# Patient Record
Sex: FEMALE | Race: BLACK OR AFRICAN AMERICAN | NOT HISPANIC OR LATINO | Employment: UNEMPLOYED | URBAN - METROPOLITAN AREA
[De-identification: names, ages, dates, MRNs, and addresses within clinical notes are randomized per-mention and may not be internally consistent; named-entity substitution may affect disease eponyms.]

---

## 2017-04-06 ENCOUNTER — TELEPHONE (OUTPATIENT)
Dept: UROLOGY | Facility: CLINIC | Age: 42
End: 2017-04-06

## 2017-04-06 NOTE — TELEPHONE ENCOUNTER
----- Message from Catia Mata sent at 4/6/2017  9:53 AM CDT -----  Contact: pt [phone]  Pt states she would like to discuss possibly changing the dosage on her medication. Pt is asking that the nurse call her. Please call pt.

## 2017-04-18 ENCOUNTER — HOSPITAL ENCOUNTER (OUTPATIENT)
Dept: PREADMISSION TESTING | Facility: HOSPITAL | Age: 42
Discharge: HOME | End: 2017-04-18
Attending: ORTHOPAEDIC SURGERY
Payer: COMMERCIAL

## 2017-04-18 VITALS
HEIGHT: 63 IN | SYSTOLIC BLOOD PRESSURE: 116 MMHG | DIASTOLIC BLOOD PRESSURE: 78 MMHG | RESPIRATION RATE: 18 BRPM | TEMPERATURE: 98 F | WEIGHT: 204 LBS | HEART RATE: 72 BPM | BODY MASS INDEX: 36.14 KG/M2

## 2017-04-18 DIAGNOSIS — Z01.818 PRE-OP TESTING: Primary | ICD-10-CM

## 2017-04-18 LAB
BASOPHILS # BLD AUTO: 0.05 K/UL
BASOPHILS NFR BLD: 0.7 %
DIFFERENTIAL METHOD: NORMAL
EOSINOPHIL # BLD AUTO: 0.3 K/UL
EOSINOPHIL NFR BLD: 3.5 %
ERYTHROCYTE [DISTWIDTH] IN BLOOD BY AUTOMATED COUNT: 12.8 %
HCT VFR BLD AUTO: 40 %
HGB BLD-MCNC: 13.2 G/DL
LYMPHOCYTES # BLD AUTO: 3.3 K/UL
LYMPHOCYTES NFR BLD: 43.3 %
MCH RBC QN AUTO: 29.9 PG
MCHC RBC AUTO-ENTMCNC: 33 %
MCV RBC AUTO: 91 FL
MONOCYTES # BLD AUTO: 0.5 K/UL
MONOCYTES NFR BLD: 6.5 %
NEUTROPHILS # BLD AUTO: 3.5 K/UL
NEUTROPHILS NFR BLD: 45.9 %
PLATELET # BLD AUTO: 263 K/UL
PMV BLD AUTO: 11.2 FL
RBC # BLD AUTO: 4.41 M/UL
WBC # BLD AUTO: 7.64 K/UL

## 2017-04-18 PROCEDURE — 85025 COMPLETE CBC W/AUTO DIFF WBC: CPT

## 2017-04-18 PROCEDURE — 36415 COLL VENOUS BLD VENIPUNCTURE: CPT

## 2017-04-18 NOTE — IP AVS SNAPSHOT
[place] [address] [place] [address]  Phone: [phone]           Patient Discharge Instructions  Our goal is to set you up for success. This packet includes information on your condition, medications, and your home care. It will help you care for yourself to prevent having to return to the hospital.     Please ask your nurse if you have any questions.      There are many details to remember when preparing for your surgery. Here is what you will need to do, please ask your nurse if there are more specific instructions and if you have any questions:    1. Before procedure Do not smoke or drink alcoholic beverages 24 hours prior to your procedure. Do not eat or drink anything 8 hours before your procedure - this includes gum, mints, and candy.     2. Day of procedure Please remove all jewelry for the procedure. If you wear contact lenses, dentures, hearing aids or glasses, bring a container to put them in during your surgery and give to a family member.  If your doctor has scheduled you for an overnight stay, bring a small overnight bag with any personal items that you need.      3. After procedure  Make arrangements in advance for transportation home by a responsible adult. It is not safe to drive a vehicle during the 24 hours following surgery.     PLEASE NOTE: You may be contacted the day before your surgery to confirm your surgery date and arrival time. The Surgery schedule has many variables which may affect the time of your surgery case. Family members should be available if your surgery time changes.           [facility]  Unless otherwise directed by your provider, please   contact [facility], our nurse care line   that is available for 24/7 assistance.     [phone] (toll-free)     Registered nurses in the [facility]   provide: appointment scheduling, clinical advisement, health education, and other advisory services.                  ** Verify  the list of medication(s) below is accurate and up to date. Carry this with you in case of emergency. If your medications have changed, please notify your healthcare provider.             Medication List      TAKE these medications        Additional Info                      amitriptyline 50 MG tablet   Commonly known as:  ELAVIL   Quantity:  30 tablet   Refills:  11   Dose:  50 mg    Instructions:  Take 1 tablet (50 mg total) by mouth every evening. At 6 p.m.     Begin Date    AM    Noon    PM    Bedtime       butalbital-acetaminophen-caffeine -40 mg -40 mg per tablet   Commonly known as:  FIORICET, ESGIC   Quantity:  50 tablet   Refills:  1   Dose:  1-2 tablet   What changed:  Another medication with the same name was removed. Continue taking this medication, and follow the directions you see here.    Instructions:  Take 1-2 tablets by mouth every 6 (six) hours as needed for Headaches.     Begin Date    AM    Noon    PM    Bedtime       estradiol 1 MG tablet   Commonly known as:  ESTRACE   Refills:  0   Dose:  1 mg    Instructions:  Take 1 mg by mouth once daily.     Begin Date    AM    Noon    PM    Bedtime       hydrOXYzine HCl 25 MG tablet   Commonly known as:  ATARAX   Quantity:  30 tablet   Refills:  11   Dose:  25 mg   Comments:  Generic For:ATARAX 25MG    Instructions:  Take 1 tablet (25 mg total) by mouth every evening.     Begin Date    AM    Noon    PM    Bedtime       methen-m.blue-s.phos-phsal-hyo 81-10.8-40.8 mg Tab   Commonly known as:  URELLE   Quantity:  30 each   Refills:  prn   Dose:  1 tablet    Instructions:  Take 1 tablet by mouth 3 (three) times daily.     Begin Date    AM    Noon    PM    Bedtime       oxybutynin 5 MG Tab   Commonly known as:  DITROPAN   Quantity:  60 tablet   Refills:  12    Instructions:  TAKE 1 TABLET BY MOUTH TWO TIMES DAILY     Begin Date    AM    Noon    PM    Bedtime       promethazine 25 MG tablet   Commonly known as:  PHENERGAN   Quantity:  15 tablet    Refills:  0   Dose:  25 mg    Instructions:  Take 1 tablet (25 mg total) by mouth every 6 (six) hours as needed for Nausea.     Begin Date    AM    Noon    PM    Bedtime                  Please bring to all follow up appointments:    1. A copy of your discharge instructions.  2. All medicines you are currently taking in their original bottles.  3. Identification and insurance card.    Please arrive 15 minutes ahead of scheduled appointment time.    Please call 24 hours in advance if you must reschedule your appointment and/or time.        Your Scheduled Appointments     Apr 18, 2017  3:30 PM CDT   Pre-Admit Testing Visit with PRE-ADMIT 1, [facility] ([facility])    [address] [place] [id number] [phone]              Your Future Surgeries/Procedures     Apr 20, 2017   Surgery with Lino Anderson MD   [facility] ([facility])    [address] [place] [id number] [phone]                  Discharge Instructions         Your surgery is scheduled for ___4/18/2017_________________.    Call [phone] between 2 p.m. and 5 p.m. on   ___4/17/2017____________ to find out your arrival time for the day of your surgery.      Please report to SAME DAY SURGERY UNIT on the 2nd FLOOR at _______ a.m.  Use front door entrance. The doors open at 0530 am.      INSTRUCTIONS IMPORTANT!!!  ¨ Do not eat or drink after 12 midnight-including water. OK to brush teeth, no   gum, candy or mints!    ¨ Take only these medicines with a small swallow of water-morning of surgery.           None         _x___  Prep instructions: ENEMA   SHOWER   OTHER  ______________________  _x___  Please shower using Hibiclens soap the night before AND  the morning of                  your surgery/procedure. Do not use Hibiclens on your face or genitals               If your surgery is around your belly button (Navel) be sure to wash inside  your            belly button also. Rinse hibiclens off completely.  __x__  No shaving of procedural area at least 4-5 days before surgery due to                        increased risk of skin irritation and/or possible infection.  __x__  Do not wear makeup, including mascara. WEARING EYE MAKEUP MAY                   LEAD TO SERIOUS EYE INJURY during surgery.  _x___  No powder, lotions or creams to your body.  _x___  You may wear only deodorant on the day of surgery.  _x___  Please remove all jewelry, including piercings and leave at home.  _x___  No money or valuables needed. Please leave at home.  You may bring your           cell phone.  _x___  Please bring any documents given by your doctor.  __x__  If going home the same day, arrange for a ride home. You will not be able to   drive if Anesthesia was used.    _x___  Wear loose fitting clothing. Allow for dressings, bandages.  _x___  Stop Aspirin, Ibuprofen, Motrin and Aleve at least 3-5 days before                       surgery, unless otherwise instructed by your doctor, or the nurse.              You MAY use Tylenol/acetaminophen until day of surgery.    _x___  Call MD for temperature above 101 degrees.        _x___ Stop taking any Fish Oil supplement or any Vitamins that contain Vitamin                  E at least 5 days prior to surgery.          I have read or had read and explained to me, and understand the above information.  Additional comments or instructions:Please call   [phone] if you have any questions regarding the instructions above.                 Admission Information     Date & Time Provider Department [facility]    4/18/2017  3:30 PM Lino Anderson MD [facility] [phone]      Care Providers     Provider Role Specialty Primary office phone    Lino Anderson MD Attending Provider Plastic Surgery [phone]      Your Vitals Were     BP Pulse Temp Resp Height Weight    116/78 (BP Location: Left arm, Patient Position: Sitting,  "BP Method: Automatic) 72 97.6 °F (36.4 °C) (Oral) 18 5' 3" (1.6 m) 92.5 kg (204 lb)    Last Period BMI             08/26/2008 36.14 kg/m2         Recent Lab Values     No lab values to display.      Allergies as of 4/18/2017        Reactions    Bactrim [Sulfamethoxazole-trimethoprim] Other (See Comments)    headache    Codeine Other (See Comments)    Other reaction(s): Vomiting    Zofran [Ondansetron Hcl (Pf)] Other (See Comments)    constipation      Advance Directives     An advance directive is a document which, in the event you are no longer able to make decisions for yourself, tells your healthcare team what kind of treatment you do or do not want to receive, or who you would like to make those decisions for you.  If you do not currently have an advance directive, [facility] encourages you to create one.  For more information call:  [phone]), [phone] ([phone]),  or log on to [email or web address].        Language Assistance Services     ATTENTION: Language assistance services are available, free of charge. Please call [phone].      ATENCIÓN: Si habla español, tiene a ortiz disposición servicios gratuitos de asistencia lingüística. Llame al [phone].     CHÚ Ý: N?u b?n nói Ti?ng Vi?t, có các d?ch v? h? tr? ngôn ng? mi?n phí dành cho b?n. G?i s? [phone].        [facility] Sign-Up     Activating your [facility] account is as easy as 1-2-3!     1) Visit [email or web address], select Sign Up Now, enter this activation code and your date of birth, then select Next.  [email or web address]  Expires: 6/2/2017 12:03 PM      2) Create a username and password to use when you visit [facility] in the future and select a security question in case you lose your password and select Next.    3) Enter your e-mail address and click Sign Up!    Additional Information  If you have questions, please e-mail [email or web address] or call [phone] to talk to our [facility] staff. Remember, [facility] is " NOT to be used for urgent needs. For medical emergencies, dial 911.          [facility] complies with applicable Federal civil rights laws and does not discriminate on the basis of race, color, national origin, age, disability, or sex.

## 2017-04-18 NOTE — DISCHARGE INSTRUCTIONS
Your surgery is scheduled for ___4/18/2017_________________.    Call [phone] between 2 p.m. and 5 p.m. on   ___4/17/2017____________ to find out your arrival time for the day of your surgery.      Please report to SAME DAY SURGERY UNIT on the 2nd FLOOR at _______ a.m.  Use front door entrance. The doors open at 0530 am.      INSTRUCTIONS IMPORTANT!!!  ¨ Do not eat or drink after 12 midnight-including water. OK to brush teeth, no   gum, candy or mints!    ¨ Take only these medicines with a small swallow of water-morning of surgery.           None         _x___  Prep instructions: ENEMA   SHOWER   OTHER  ______________________  _x___  Please shower using Hibiclens soap the night before AND  the morning of                  your surgery/procedure. Do not use Hibiclens on your face or genitals               If your surgery is around your belly button (Navel) be sure to wash inside your            belly button also. Rinse hibiclens off completely.  __x__  No shaving of procedural area at least 4-5 days before surgery due to                        increased risk of skin irritation and/or possible infection.  __x__  Do not wear makeup, including mascara. WEARING EYE MAKEUP MAY                   LEAD TO SERIOUS EYE INJURY during surgery.  _x___  No powder, lotions or creams to your body.  _x___  You may wear only deodorant on the day of surgery.  _x___  Please remove all jewelry, including piercings and leave at home.  _x___  No money or valuables needed. Please leave at home.  You may bring your           cell phone.  _x___  Please bring any documents given by your doctor.  __x__  If going home the same day, arrange for a ride home. You will not be able to   drive if Anesthesia was used.    _x___  Wear loose fitting clothing. Allow for dressings, bandages.  _x___  Stop Aspirin, Ibuprofen, Motrin and Aleve at least 3-5 days before                       surgery, unless otherwise instructed by your doctor, or the nurse.               You MAY use Tylenol/acetaminophen until day of surgery.    _x___  Call MD for temperature above 101 degrees.        _x___ Stop taking any Fish Oil supplement or any Vitamins that contain Vitamin                  E at least 5 days prior to surgery.          I have read or had read and explained to me, and understand the above information.  Additional comments or instructions:Please call   [phone] if you have any questions regarding the instructions above.

## 2017-04-19 ENCOUNTER — ANESTHESIA EVENT (OUTPATIENT)
Dept: SURGERY | Facility: HOSPITAL | Age: 42
End: 2017-04-19
Payer: COMMERCIAL

## 2017-04-20 ENCOUNTER — ANESTHESIA (OUTPATIENT)
Dept: SURGERY | Facility: HOSPITAL | Age: 42
End: 2017-04-20
Payer: COMMERCIAL

## 2017-04-20 ENCOUNTER — HOSPITAL ENCOUNTER (OUTPATIENT)
Facility: HOSPITAL | Age: 42
Discharge: HOME | End: 2017-04-21
Attending: PLASTIC SURGERY | Admitting: PLASTIC SURGERY
Payer: COMMERCIAL

## 2017-04-20 DIAGNOSIS — N62 MACROMASTIA: Primary | ICD-10-CM

## 2017-04-20 PROCEDURE — 63600175 PHARM REV CODE 636 W HCPCS: Performed by: PLASTIC SURGERY

## 2017-04-20 PROCEDURE — 88305 TISSUE EXAM BY PATHOLOGIST: CPT | Mod: 26,,, | Performed by: PATHOLOGY

## 2017-04-20 PROCEDURE — 36000707: Performed by: PLASTIC SURGERY

## 2017-04-20 PROCEDURE — 00402 ANES INTEG SYS RCNSTV BREAST: CPT | Performed by: PLASTIC SURGERY

## 2017-04-20 PROCEDURE — 71000039 HC RECOVERY, EACH ADD'L HOUR: Performed by: PLASTIC SURGERY

## 2017-04-20 PROCEDURE — 25000003 PHARM REV CODE 250: Performed by: ANESTHESIOLOGY

## 2017-04-20 PROCEDURE — 71000033 HC RECOVERY, INTIAL HOUR: Performed by: PLASTIC SURGERY

## 2017-04-20 PROCEDURE — C1729 CATH, DRAINAGE: HCPCS | Performed by: PLASTIC SURGERY

## 2017-04-20 PROCEDURE — V2790 AMNIOTIC MEMBRANE: HCPCS | Performed by: PLASTIC SURGERY

## 2017-04-20 PROCEDURE — 63600175 PHARM REV CODE 636 W HCPCS: Performed by: NURSE ANESTHETIST, CERTIFIED REGISTERED

## 2017-04-20 PROCEDURE — 27100025 HC TUBING, SET FLUID WARMER: Performed by: NURSE ANESTHETIST, CERTIFIED REGISTERED

## 2017-04-20 PROCEDURE — 88305 TISSUE EXAM BY PATHOLOGIST: CPT | Performed by: PATHOLOGY

## 2017-04-20 PROCEDURE — 63600175 PHARM REV CODE 636 W HCPCS: Performed by: ANESTHESIOLOGY

## 2017-04-20 PROCEDURE — D9220A PRA ANESTHESIA: Mod: ANES,,, | Performed by: ANESTHESIOLOGY

## 2017-04-20 PROCEDURE — 37000008 HC ANESTHESIA 1ST 15 MINUTES: Performed by: PLASTIC SURGERY

## 2017-04-20 PROCEDURE — 36000706: Performed by: PLASTIC SURGERY

## 2017-04-20 PROCEDURE — 63600175 PHARM REV CODE 636 W HCPCS

## 2017-04-20 PROCEDURE — 37000009 HC ANESTHESIA EA ADD 15 MINS: Performed by: PLASTIC SURGERY

## 2017-04-20 PROCEDURE — 25000003 PHARM REV CODE 250: Performed by: PLASTIC SURGERY

## 2017-04-20 PROCEDURE — 25000003 PHARM REV CODE 250: Performed by: NURSE ANESTHETIST, CERTIFIED REGISTERED

## 2017-04-20 PROCEDURE — D9220A PRA ANESTHESIA: Mod: CRNA,,, | Performed by: NURSE ANESTHETIST, CERTIFIED REGISTERED

## 2017-04-20 DEVICE — IMPLANTABLE DEVICE: Type: IMPLANTABLE DEVICE | Site: BREAST | Status: FUNCTIONAL

## 2017-04-20 RX ORDER — PROMETHAZINE HYDROCHLORIDE 25 MG/ML
12.5 INJECTION, SOLUTION INTRAMUSCULAR; INTRAVENOUS EVERY 6 HOURS PRN
Status: DISCONTINUED | OUTPATIENT
Start: 2017-04-20 | End: 2017-04-21 | Stop reason: HOSPADM

## 2017-04-20 RX ORDER — DEXAMETHASONE SODIUM PHOSPHATE 4 MG/ML
INJECTION, SOLUTION INTRA-ARTICULAR; INTRALESIONAL; INTRAMUSCULAR; INTRAVENOUS; SOFT TISSUE
Status: DISCONTINUED | OUTPATIENT
Start: 2017-04-20 | End: 2017-04-20

## 2017-04-20 RX ORDER — FENTANYL CITRATE 50 UG/ML
INJECTION, SOLUTION INTRAMUSCULAR; INTRAVENOUS
Status: DISPENSED
Start: 2017-04-20 | End: 2017-04-21

## 2017-04-20 RX ORDER — DIPHENHYDRAMINE HYDROCHLORIDE 50 MG/ML
25 INJECTION INTRAMUSCULAR; INTRAVENOUS EVERY 6 HOURS PRN
Status: DISCONTINUED | OUTPATIENT
Start: 2017-04-20 | End: 2017-04-20 | Stop reason: HOSPADM

## 2017-04-20 RX ORDER — METOCLOPRAMIDE HYDROCHLORIDE 5 MG/ML
10 INJECTION INTRAMUSCULAR; INTRAVENOUS EVERY 6 HOURS PRN
Status: DISCONTINUED | OUTPATIENT
Start: 2017-04-21 | End: 2017-04-21 | Stop reason: HOSPADM

## 2017-04-20 RX ORDER — MIDAZOLAM HYDROCHLORIDE 1 MG/ML
INJECTION INTRAMUSCULAR; INTRAVENOUS
Status: DISPENSED
Start: 2017-04-20 | End: 2017-04-21

## 2017-04-20 RX ORDER — FENTANYL CITRATE 50 UG/ML
INJECTION, SOLUTION INTRAMUSCULAR; INTRAVENOUS
Status: DISCONTINUED | OUTPATIENT
Start: 2017-04-20 | End: 2017-04-20

## 2017-04-20 RX ORDER — CEFAZOLIN SODIUM 2 G/50ML
SOLUTION INTRAVENOUS
Status: DISPENSED
Start: 2017-04-20 | End: 2017-04-21

## 2017-04-20 RX ORDER — MEPERIDINE HYDROCHLORIDE 50 MG/ML
12.5 INJECTION INTRAMUSCULAR; INTRAVENOUS; SUBCUTANEOUS ONCE AS NEEDED
Status: DISCONTINUED | OUTPATIENT
Start: 2017-04-20 | End: 2017-04-20 | Stop reason: HOSPADM

## 2017-04-20 RX ORDER — CEFAZOLIN SODIUM 2 G/50ML
2 SOLUTION INTRAVENOUS
Status: DISCONTINUED | OUTPATIENT
Start: 2017-04-20 | End: 2017-04-21 | Stop reason: HOSPADM

## 2017-04-20 RX ORDER — PHENYLEPHRINE HYDROCHLORIDE 10 MG/ML
INJECTION INTRAVENOUS
Status: DISCONTINUED | OUTPATIENT
Start: 2017-04-20 | End: 2017-04-20

## 2017-04-20 RX ORDER — LIDOCAINE HYDROCHLORIDE AND EPINEPHRINE 5; 5 MG/ML; UG/ML
INJECTION, SOLUTION INFILTRATION; PERINEURAL
Status: DISCONTINUED | OUTPATIENT
Start: 2017-04-20 | End: 2017-04-20 | Stop reason: HOSPADM

## 2017-04-20 RX ORDER — LORAZEPAM 2 MG/ML
0.25 INJECTION INTRAMUSCULAR ONCE AS NEEDED
Status: DISCONTINUED | OUTPATIENT
Start: 2017-04-20 | End: 2017-04-20 | Stop reason: HOSPADM

## 2017-04-20 RX ORDER — PROMETHAZINE HYDROCHLORIDE 25 MG/1
25 SUPPOSITORY RECTAL EVERY 6 HOURS PRN
Status: DISCONTINUED | OUTPATIENT
Start: 2017-04-20 | End: 2017-04-21 | Stop reason: HOSPADM

## 2017-04-20 RX ORDER — SODIUM CHLORIDE, SODIUM LACTATE, POTASSIUM CHLORIDE, CALCIUM CHLORIDE 600; 310; 30; 20 MG/100ML; MG/100ML; MG/100ML; MG/100ML
INJECTION, SOLUTION INTRAVENOUS CONTINUOUS
Status: DISCONTINUED | OUTPATIENT
Start: 2017-04-20 | End: 2017-04-21 | Stop reason: HOSPADM

## 2017-04-20 RX ORDER — LIDOCAINE HYDROCHLORIDE 10 MG/ML
1 INJECTION, SOLUTION EPIDURAL; INFILTRATION; INTRACAUDAL; PERINEURAL ONCE
Status: DISCONTINUED | OUTPATIENT
Start: 2017-04-20 | End: 2017-04-21 | Stop reason: HOSPADM

## 2017-04-20 RX ORDER — OXYCODONE AND ACETAMINOPHEN 5; 325 MG/1; MG/1
2 TABLET ORAL EVERY 4 HOURS PRN
Status: DISCONTINUED | OUTPATIENT
Start: 2017-04-20 | End: 2017-04-21 | Stop reason: HOSPADM

## 2017-04-20 RX ORDER — MIDAZOLAM HYDROCHLORIDE 1 MG/ML
INJECTION, SOLUTION INTRAMUSCULAR; INTRAVENOUS
Status: DISCONTINUED | OUTPATIENT
Start: 2017-04-20 | End: 2017-04-20

## 2017-04-20 RX ORDER — ROCURONIUM BROMIDE 10 MG/ML
INJECTION, SOLUTION INTRAVENOUS
Status: DISCONTINUED | OUTPATIENT
Start: 2017-04-20 | End: 2017-04-20

## 2017-04-20 RX ORDER — PROPOFOL 10 MG/ML
VIAL (ML) INTRAVENOUS
Status: DISCONTINUED | OUTPATIENT
Start: 2017-04-20 | End: 2017-04-20

## 2017-04-20 RX ORDER — KETOROLAC TROMETHAMINE 30 MG/ML
30 INJECTION, SOLUTION INTRAMUSCULAR; INTRAVENOUS EVERY 6 HOURS
Status: COMPLETED | OUTPATIENT
Start: 2017-04-20 | End: 2017-04-21

## 2017-04-20 RX ORDER — OXYCODONE AND ACETAMINOPHEN 5; 325 MG/1; MG/1
1 TABLET ORAL EVERY 4 HOURS PRN
Status: DISCONTINUED | OUTPATIENT
Start: 2017-04-20 | End: 2017-04-21 | Stop reason: HOSPADM

## 2017-04-20 RX ORDER — HYDROMORPHONE HYDROCHLORIDE 2 MG/ML
0.2 INJECTION, SOLUTION INTRAMUSCULAR; INTRAVENOUS; SUBCUTANEOUS EVERY 5 MIN PRN
Status: DISCONTINUED | OUTPATIENT
Start: 2017-04-20 | End: 2017-04-20 | Stop reason: HOSPADM

## 2017-04-20 RX ORDER — LIDOCAINE HCL/PF 100 MG/5ML
SYRINGE (ML) INTRAVENOUS
Status: DISCONTINUED | OUTPATIENT
Start: 2017-04-20 | End: 2017-04-20

## 2017-04-20 RX ORDER — METOPROLOL TARTRATE 1 MG/ML
INJECTION, SOLUTION INTRAVENOUS
Status: DISCONTINUED | OUTPATIENT
Start: 2017-04-20 | End: 2017-04-20

## 2017-04-20 RX ORDER — ACETAMINOPHEN 10 MG/ML
INJECTION, SOLUTION INTRAVENOUS
Status: COMPLETED
Start: 2017-04-20 | End: 2017-04-20

## 2017-04-20 RX ADMIN — DEXAMETHASONE SODIUM PHOSPHATE 8 MG: 4 INJECTION, SOLUTION INTRAMUSCULAR; INTRAVENOUS at 12:04

## 2017-04-20 RX ADMIN — HYDROMORPHONE HYDROCHLORIDE 0.2 MG: 2 INJECTION INTRAMUSCULAR; INTRAVENOUS; SUBCUTANEOUS at 06:04

## 2017-04-20 RX ADMIN — SODIUM CHLORIDE, SODIUM LACTATE, POTASSIUM CHLORIDE, AND CALCIUM CHLORIDE: .6; .31; .03; .02 INJECTION, SOLUTION INTRAVENOUS at 02:04

## 2017-04-20 RX ADMIN — PROPOFOL 200 MG: 10 INJECTION, EMULSION INTRAVENOUS at 12:04

## 2017-04-20 RX ADMIN — ROCURONIUM BROMIDE 20 MG: 10 INJECTION, SOLUTION INTRAVENOUS at 01:04

## 2017-04-20 RX ADMIN — KETOROLAC TROMETHAMINE 30 MG: 30 INJECTION, SOLUTION INTRAMUSCULAR at 06:04

## 2017-04-20 RX ADMIN — ROCURONIUM BROMIDE 40 MG: 10 INJECTION, SOLUTION INTRAVENOUS at 12:04

## 2017-04-20 RX ADMIN — LIDOCAINE HYDROCHLORIDE 100 MG: 20 INJECTION, SOLUTION INTRAVENOUS at 12:04

## 2017-04-20 RX ADMIN — METOPROLOL TARTRATE 2.5 MG: 1 INJECTION, SOLUTION INTRAVENOUS at 01:04

## 2017-04-20 RX ADMIN — MIDAZOLAM HYDROCHLORIDE 2 MG: 1 INJECTION, SOLUTION INTRAMUSCULAR; INTRAVENOUS at 12:04

## 2017-04-20 RX ADMIN — FENTANYL CITRATE 25 MCG: 50 INJECTION INTRAMUSCULAR; INTRAVENOUS at 05:04

## 2017-04-20 RX ADMIN — PHENYLEPHRINE HYDROCHLORIDE 200 MCG: 10 INJECTION INTRAVENOUS at 03:04

## 2017-04-20 RX ADMIN — PROMETHAZINE HYDROCHLORIDE 6.25 MG: 25 INJECTION INTRAMUSCULAR; INTRAVENOUS at 06:04

## 2017-04-20 RX ADMIN — CEFAZOLIN SODIUM 2 G: 2 SOLUTION INTRAVENOUS at 12:04

## 2017-04-20 RX ADMIN — PROMETHAZINE HYDROCHLORIDE 25 MG: 25 SUPPOSITORY RECTAL at 09:04

## 2017-04-20 RX ADMIN — FENTANYL CITRATE 50 MCG: 50 INJECTION INTRAMUSCULAR; INTRAVENOUS at 04:04

## 2017-04-20 RX ADMIN — FENTANYL CITRATE 100 MCG: 50 INJECTION INTRAMUSCULAR; INTRAVENOUS at 12:04

## 2017-04-20 RX ADMIN — SODIUM CHLORIDE, SODIUM LACTATE, POTASSIUM CHLORIDE, AND CALCIUM CHLORIDE: .6; .31; .03; .02 INJECTION, SOLUTION INTRAVENOUS at 05:04

## 2017-04-20 RX ADMIN — CEFAZOLIN SODIUM 2 G: 2 SOLUTION INTRAVENOUS at 04:04

## 2017-04-20 RX ADMIN — KETOROLAC TROMETHAMINE 30 MG: 30 INJECTION, SOLUTION INTRAMUSCULAR at 11:04

## 2017-04-20 RX ADMIN — ACETAMINOPHEN 1000 MG: 10 INJECTION, SOLUTION INTRAVENOUS at 04:04

## 2017-04-20 RX ADMIN — SODIUM CHLORIDE, SODIUM LACTATE, POTASSIUM CHLORIDE, AND CALCIUM CHLORIDE: .6; .31; .03; .02 INJECTION, SOLUTION INTRAVENOUS at 10:04

## 2017-04-20 RX ADMIN — FENTANYL CITRATE 100 MCG: 50 INJECTION INTRAMUSCULAR; INTRAVENOUS at 01:04

## 2017-04-20 RX ADMIN — METOCLOPRAMIDE 10 MG: 5 INJECTION, SOLUTION INTRAMUSCULAR; INTRAVENOUS at 11:04

## 2017-04-20 RX ADMIN — FENTANYL CITRATE 50 MCG: 50 INJECTION INTRAMUSCULAR; INTRAVENOUS at 05:04

## 2017-04-20 RX ADMIN — FENTANYL CITRATE 50 MCG: 50 INJECTION INTRAMUSCULAR; INTRAVENOUS at 02:04

## 2017-04-20 RX ADMIN — DEXAMETHASONE SODIUM PHOSPHATE 6 MG: 4 INJECTION, SOLUTION INTRAMUSCULAR; INTRAVENOUS at 04:04

## 2017-04-20 NOTE — INTERVAL H&P NOTE
The patient has been examined and the H&P has been reviewed:Scan on 4/18/2017 1:11 PM by Kaylee Lin : H&P    I concur with the findings and no changes have occurred since H&P was written.    Anesthesia/Surgery risks, benefits and alternative options discussed and understood by patient/family.          Active Hospital Problems    Diagnosis  POA    Macromastia [N62]  Yes      Resolved Hospital Problems    Diagnosis Date Resolved POA   No resolved problems to display.

## 2017-04-20 NOTE — BRIEF OP NOTE
[facility]  Brief Operative Note     SUMMARY     Surgery Date: 4/20/2017     Surgeon(s) and Role:     * Lino Anderson MD - Primary    Assisting Surgeon: Ubaldo    Pre-op Diagnosis:  Macromastia [N62]  Chronic neck pain [M54.2, G89.29]  Chronic back pain, unspecified back location, unspecified back pain laterality [M54.9, G89.29]    Post-op Diagnosis:  Post-Op Diagnosis Codes:     * Macromastia [N62]     * Chronic neck pain [M54.2, G89.29]     * Chronic back pain, unspecified back location, unspecified back pain laterality [M54.9, G89.29]    Procedure(s) (LRB):  REDUCTION-MAMMOPLASTY-BILATERAL (Bilateral)    Anesthesia: General    Description of the findings of the procedure: marked preop area.  NNP was 24 cm from sternal notch, vertical limbs were 9 cm divergent 60 degrees.      Findings/Key Components: marked macromastia, firm breasts, moderate ptosis.  Inferior pedical reduction Right 930 grams; Left 832 grams, tollerated well    Estimated Blood Loss: 50 cc         Specimens:   Specimen (12h ago through future)    Start     Ordered    04/20/17 1622  Specimen to Pathology - Surgery  Once     Comments:  -Right breast tissue    -Left breast tissue    For FRESH    04/20/17 1622    04/20/17 1447  Specimen to Pathology - Surgery  Once     Comments:  Left breast tissue    04/20/17 1447          Discharge Note    SUMMARY     Admit Date: 4/20/2017    Discharge Date and Time: No discharge date for patient encounter.    Hospital Course Patient had chronic neck and back pain associated with marcomastia.  She underwent reduction mammaplasty.    Final Diagnosis: Post-Op Diagnosis Codes:     * Macromastia [N62]     * Chronic neck pain [M54.2, G89.29]     * Chronic back pain, unspecified back location, unspecified back pain laterality [M54.9, G89.29]    Disposition: Home or Self Care   Dict [id number]    Follow Up/Patient Instructions: up as desired, may shower and shampoo tomorrow.    Medications:  Reconciled  Home Medications:   Current Discharge Medication List      CONTINUE these medications which have NOT CHANGED    Details   amitriptyline (ELAVIL) 50 MG tablet Take 1 tablet (50 mg total) by mouth every evening. At 6 p.m.  Qty: 30 tablet, Refills: 11    Associated Diagnoses: Intractable migraine without aura and without status migrainosus      estradiol (ESTRACE) 1 MG tablet Take 1 mg by mouth once daily.       hydrOXYzine HCl (ATARAX) 25 MG tablet Take 1 tablet (25 mg total) by mouth every evening.  Qty: 30 tablet, Refills: 11    Comments: Generic For:ATARAX 25MG      oxybutynin (DITROPAN) 5 MG Tab TAKE 1 TABLET BY MOUTH TWO TIMES DAILY  Qty: 60 tablet, Refills: 12    Associated Diagnoses: OAB (overactive bladder)      promethazine (PHENERGAN) 25 MG tablet Take 1 tablet (25 mg total) by mouth every 6 (six) hours as needed for Nausea.  Qty: 15 tablet, Refills: 0      TAMSULOSIN HCL (FLOMAX ORAL) Take 0.4 mg by mouth once daily.      butalbital-acetaminophen-caffeine -40 mg (FIORICET, ESGIC) -40 mg per tablet Take 1-2 tablets by mouth every 6 (six) hours as needed for Headaches.  Qty: 50 tablet, Refills: 1    Associated Diagnoses: Intractable migraine without aura and without status migrainosus      chey-m.blue-s.phos-phsal-hyo (URELLE) 81-10.8-40.8 mg Tab Take 1 tablet by mouth 3 (three) times daily.  Qty: 30 each, Refills: prn    Associated Diagnoses: Dysuria; Interstitial cystitis           No discharge procedures on file.

## 2017-04-20 NOTE — TRANSFER OF CARE
"Anesthesia Transfer of Care Note    Patient: Elana Gonzales    Procedure(s) Performed: Procedure(s) (LRB):  REDUCTION-MAMMOPLASTY-BILATERAL (Bilateral)    Patient location: PACU    Anesthesia Type: general    Transport from OR: Transported from OR on room air with adequate spontaneous ventilation    Post pain: adequate analgesia    Post assessment: no apparent anesthetic complications    Post vital signs: stable    Level of consciousness: sedated and responds to stimulation    Nausea/Vomiting: no nausea/vomiting    Complications: none          Last vitals:   Visit Vitals    /70 (BP Location: Right arm, Patient Position: Lying, BP Method: Automatic)    Pulse (!) 114    Temp 37.5 °C (99.5 °F) (Oral)    Resp 10    Ht 5' 3" (1.6 m)    Wt 92.5 kg (204 lb)    LMP 08/26/2008    SpO2 96%    BMI 36.14 kg/m2     "

## 2017-04-20 NOTE — IP AVS SNAPSHOT
[place] [address] [address] [place] [address]  Phone: [phone]           Patient Discharge Instructions   Our goal is to set you up for success. This packet includes information on your condition, medications, and your home care.  It will help you care for yourself to prevent having to return to the hospital.     Please ask your nurse if you have any questions.      There are many details to remember when preparing to leave the hospital. Here is what you will need to do:    1. Take your medicine. If you are prescribed medications, review your Medication List on the following pages. You may have new medications to  at the pharmacy and others that you'll need to stop taking. Review the instructions for how and when to take your medications. Talk with your doctor or nurses if you are unsure of what to do.     2. Go to your follow-up appointments. Specific follow-up information is listed in the following pages. Your may be contacted by a nurse or clinical provider about future appointments. Be sure we have all of the phone numbers to reach you. Please contact your provider's office if you are unable to make an appointment.     3. Watch for warning signs. Your doctor or nurse will give you detailed warning signs to watch for and when to call for assistance. These instructions may also include educational information about your condition. If you experience any of warning signs to your health, call your doctor.           [facility]  Unless otherwise directed by your provider, please   contact [facility], our nurse care line   that is available for 24/7 assistance.     [phone] (toll-free)     Registered nurses in the [facility]   provide: appointment scheduling, clinical advisement, health education, and other advisory services.                  ** Verify the list of medication(s) below is accurate and up to date. Carry this with you in case of emergency.  If your medications have changed, please notify your healthcare provider.             Medication List      ASK your doctor about these medications        Additional Info                      amitriptyline 50 MG tablet   Commonly known as:  ELAVIL   Quantity:  30 tablet   Refills:  11   Dose:  50 mg    Instructions:  Take 1 tablet (50 mg total) by mouth every evening. At 6 p.m.     Begin Date    AM    Noon    PM    Bedtime       butalbital-acetaminophen-caffeine -40 mg -40 mg per tablet   Commonly known as:  FIORICET, ESGIC   Quantity:  50 tablet   Refills:  1   Dose:  1-2 tablet    Instructions:  Take 1-2 tablets by mouth every 6 (six) hours as needed for Headaches.     Begin Date    AM    Noon    PM    Bedtime       estradiol 1 MG tablet   Commonly known as:  ESTRACE   Refills:  0   Dose:  1 mg    Instructions:  Take 1 mg by mouth once daily.     Begin Date    AM    Noon    PM    Bedtime       FLOMAX ORAL   Refills:  0   Dose:  0.4 mg    Instructions:  Take 0.4 mg by mouth once daily.     Begin Date    AM    Noon    PM    Bedtime       hydrOXYzine HCl 25 MG tablet   Commonly known as:  ATARAX   Quantity:  30 tablet   Refills:  11   Dose:  25 mg   Comments:  Generic For:ATARAX 25MG    Instructions:  Take 1 tablet (25 mg total) by mouth every evening.     Begin Date    AM    Noon    PM    Bedtime       methen-m.blue-s.phos-phsal-hyo 81-10.8-40.8 mg Tab   Commonly known as:  URELLE   Quantity:  30 each   Refills:  prn   Dose:  1 tablet    Instructions:  Take 1 tablet by mouth 3 (three) times daily.     Begin Date    AM    Noon    PM    Bedtime       oxybutynin 5 MG Tab   Commonly known as:  DITROPAN   Quantity:  60 tablet   Refills:  12    Instructions:  TAKE 1 TABLET BY MOUTH TWO TIMES DAILY     Begin Date    AM    Noon    PM    Bedtime       promethazine 25 MG tablet   Commonly known as:  PHENERGAN   Quantity:  15 tablet   Refills:  0   Dose:  25 mg    Instructions:  Take 1 tablet (25 mg total) by mouth  "every 6 (six) hours as needed for Nausea.     Begin Date    AM    Noon    PM    Bedtime                  Please bring to all follow up appointments:    1. A copy of your discharge instructions.  2. All medicines you are currently taking in their original bottles.  3. Identification and insurance card.    Please arrive 15 minutes ahead of scheduled appointment time.    Please call 24 hours in advance if you must reschedule your appointment and/or time.        Follow-up Information     Follow up with Lino Anderson MD In 1 week.    Specialty:  Plastic Surgery    Contact information:    [address] [facility] PLASTIC/RECONSTRUCTIVE SURGERY  3RD FLOOR  [facility] [address]  [phone]          Discharge References/Attachments     BREAST REDUCTION, DISCHARGE INSTRUCTIONS (ENGLISH)        Admission Information     Date & Time Provider Department CSN    4/20/2017  9:58 AM Lino Anderson MD [facility] [id number]      Care Providers     Provider Role Specialty Primary office phone    Lino Anderson MD Attending Provider Plastic Surgery [phone]    Lino Anderson MD Surgeon  Plastic Surgery [phone]      Your Vitals Were     BP Pulse Temp Resp Height Weight    123/71 78 98.1 °F (36.7 °C) 18 5' 3" (1.6 m) 92.5 kg (204 lb)    Last Period SpO2 BMI          08/26/2008 98% 36.14 kg/m2        Recent Lab Values     No lab values to display.      Pending Labs     Order Current Status    Specimen to Pathology - Surgery Collected (04/20/17 1622)    Specimen to Pathology - Surgery In process      Allergies as of 4/21/2017        Reactions    Bactrim [Sulfamethoxazole-trimethoprim] Other (See Comments)    headache    Codeine Other (See Comments)    Other reaction(s): Vomiting    Zofran [Ondansetron Hcl (Pf)] Other (See Comments)    constipation      Advance Directives     An advance directive is a document which, in the event you are no longer able to make decisions for yourself, tells your " healthcare team what kind of treatment you do or do not want to receive, or who you would like to make those decisions for you.  If you do not currently have an advance directive, [facility] encourages you to create one.  For more information call:  [phone] ([phone]), [phone] ([phone]),  or log on to [email or web address].        Language Assistance Services     ATTENTION: Language assistance services are available, free of charge. Please call [phone].      ATENCIÓN: Si habla español, tiene a ortiz disposición servicios gratuitos de asistencia lingüística. Llame al [phone].     CHÚ Ý: N?u b?n nói Ti?ng Vi?t, có các d?ch v? h? tr? ngôn ng? mi?n phí dành cho b?n. G?i s? [phone].        [facility] Sign-Up     Activating your [facility] account is as easy as 1-2-3!     1) Visit [email or web address], select Sign Up Now, enter this activation code and your date of birth, then select Next.  [email or web address]  Expires: 6/2/2017 12:03 PM      2) Create a username and password to use when you visit [facility] in the future and select a security question in case you lose your password and select Next.    3) Enter your e-mail address and click Sign Up!    Additional Information  If you have questions, please e-mail [email or web address] or call [phone] to talk to our [facility] staff. Remember, [facility] is NOT to be used for urgent needs. For medical emergencies, dial 911.          [facility] complies with applicable Federal civil rights laws and does not discriminate on the basis of race, color, national origin, age, disability, or sex.

## 2017-04-21 VITALS
DIASTOLIC BLOOD PRESSURE: 71 MMHG | BODY MASS INDEX: 36.14 KG/M2 | HEART RATE: 78 BPM | SYSTOLIC BLOOD PRESSURE: 123 MMHG | OXYGEN SATURATION: 98 % | HEIGHT: 63 IN | WEIGHT: 204 LBS | RESPIRATION RATE: 18 BRPM | TEMPERATURE: 98 F

## 2017-04-21 PROCEDURE — 63600175 PHARM REV CODE 636 W HCPCS

## 2017-04-21 PROCEDURE — 63600175 PHARM REV CODE 636 W HCPCS: Performed by: ANESTHESIOLOGY

## 2017-04-21 RX ADMIN — METOCLOPRAMIDE 10 MG: 5 INJECTION, SOLUTION INTRAMUSCULAR; INTRAVENOUS at 05:04

## 2017-04-21 RX ADMIN — KETOROLAC TROMETHAMINE 30 MG: 30 INJECTION, SOLUTION INTRAMUSCULAR at 11:04

## 2017-04-21 RX ADMIN — KETOROLAC TROMETHAMINE 30 MG: 30 INJECTION, SOLUTION INTRAMUSCULAR at 05:04

## 2017-04-21 NOTE — PLAN OF CARE
Problem: Patient Care Overview  Goal: Plan of Care Review  Outcome: Ongoing (interventions implemented as appropriate)    04/20/17 1911   Coping/Psychosocial   Plan Of Care Reviewed With patient;daughter      Released from PACU per Dr Gonzalez. Drowsy but awakens to voice. Oriented x 3. Appears asleep, c/o pain 5/10 when awakened. Intermittent nausea resolved with administration of anti-emetic. Surgical dressings cdi. Awaiting room assignment.     Problem: Surgery Nonspecified (Adult)  Goal: Signs and Symptoms of Listed Potential Problems Will be Absent, Minimized or Managed (Surgery Nonspecified)  Signs and symptoms of listed potential problems will be absent, minimized or managed by discharge/transition of care (reference Surgery Nonspecified (Adult) CPG).  Outcome: Ongoing (interventions implemented as appropriate)    04/20/17 1911   Surgery Nonspecified   Problems Assessed (Surgery) bleeding/anemia;pain;postoperative nausea and vomiting;respiratory compromise;situational response   Problems Present (Surgery) pain       Goal: Anesthesia/Sedation Recovery  Outcome: Outcome(s) achieved Date Met:  04/20/17 04/20/17 1911   Goal/Outcome Evaluation   Anesthesia/Sedation Recovery criteria met for transfer

## 2017-04-21 NOTE — PROGRESS NOTES
Spoke to Dr. Conner regarding status of patient tolerating diet, orders to inform him of tolerating regular diet and when ready to discharge. Will continue to monitor.

## 2017-04-21 NOTE — PLAN OF CARE
Problem: Patient Care Overview  Goal: Plan of Care Review  Outcome: Ongoing (interventions implemented as appropriate)  POC discussed with pt, verb understanding. VSS, n/v relieved with Reglan admin.

## 2017-04-21 NOTE — PROGRESS NOTES
Notified Dr. Conner that patient tolerated a regular diet for lunch and wishes to be discharged. Verbal order to discharge patient home, ok to shower, home with LENIN drains, and follow up with MD in 1 week.

## 2017-04-21 NOTE — TREATMENT PLAN
Called placed to Dr. Anderson. Notified of pt vomitting, no IV phenergan available in hospital. New orders given, see chart.

## 2017-04-21 NOTE — PLAN OF CARE
Problem: Patient Care Overview  Goal: Plan of Care Review  Outcome: Ongoing (interventions implemented as appropriate)  Plan of care reviewed. VSS, gauze with surgical bra C/D/I. Patient wishes to discharge home today. Frequent checks made to ensure safety and comfort. Bed low, call bell within reach. Will continue to monitor.

## 2017-04-21 NOTE — PROGRESS NOTES
Plastic Surgery Progress Note  POD1 s/p B breast reduction    S: N/V overnight, not relieved by phenergan, some relief with Reglan. Not debbie diet. Afebrile. Pain controlled.    O:  VSS  RRR  Breast incisions C/D/I, ~50cc serosang R LENIN, ~30cc L LENIN    A: 40yo F Hx macromastia POD1 s/p B breast reduction    P:  -Cont post op care with N/V control  -Will need N/V to improve with some PO intake prior to discharge  -Cont local wound and drain care

## 2017-04-21 NOTE — OP NOTE
DATE OF PROCEDURE:  04/20/2017    PREOPERATIVE DIAGNOSES:  Chronic neck and back pain, macromastia.    POSTOPERATIVE DIAGNOSES:  Chronic neck and back pain, macromastia.    PROCEDURE:  Bilateral reduction mammoplasty.    SURGEON:  Lino Anderson M.D.    ASSISTANT:  Ubaldo.    ANESTHESIA:  General endotracheal.    PROCEDURE IN DETAIL:  The patient was marked in the preanesthetic area.  She had   a very large, very firm and very heavy breasts.  The new nipple point was   selected at 24 cm and the vertical limbs were 9 cm in length and diverged at 60   degrees.  The inframammary fold was marked, the medial and lateral points were   marked and the rest of the markings were placed on the patient.  She was then   taken to the Operating Room and placed under adequate general endotracheal   anesthesia, sterile catheter was inserted into the bladder and antiembolic pumps   were placed on the legs.    The chest wall was prepped with Betadine scrub followed by Betadine solution,   draped in a customary fashion.    Both breasts were done in the same fashion.  A nipple marker of 4.5 cm in   diameter was used to margret the nipple.  The inferior pedicle was then   deepithelialized.  The medial, lateral and superior triangle of skin and breast   were excised.    The breasts superior, medial and lateral flaps were then thinned to a thickness   of approximately 2.5 cm.  The anterior pedicle was tacked to the chest wall with   2-0 PDS.    The medial and lateral limbs were then placed with a 3-0 Vicryl suture into the   middle of the inframammary fold and the rest of the wounds were staple closed.    Two suction drains were left in prior to closing the wounds.  The patient was   then placed in the sitting position and the new nipple point was selected at 21   cm.    A 4 cm disk was used on both sides to make an outline for the excision and the   underlying skin and breast were excised.  The nipple was exteriorized.  The   wounds were  closed with 3-0 Vicryl and the areola and 4-0 Monocryl and the   vertical and horizontal limbs were closed with 2-0 Quill double layered.    Dermabond tape completed the closure.  The patient had 930 g removed from the   larger right side, 832 g from the smaller right side.  She tolerated the   procedure well and was sent back to Recovery Room in good condition.  Estimated   blood loss was about 50 mL.      CLD/  dd: 04/20/2017 18:00:59 (CDT)  td: 04/20/2017 21:50:49 (CDT)  Doc ID   #[id number]  Job ID #[id number]    CC:

## 2017-04-21 NOTE — TREATMENT PLAN
Pt called out, vomitting. Emesis noted on floor, covered with towel. Housekeeping called for clean up.

## 2017-04-21 NOTE — ANESTHESIA POSTPROCEDURE EVALUATION
"Anesthesia Post Evaluation    Patient: Elana Gonzales    Procedure(s) Performed: Procedure(s) (LRB):  REDUCTION-MAMMOPLASTY-BILATERAL (Bilateral)    Final Anesthesia Type: general  Patient location during evaluation: PACU  Patient participation: Yes- Able to Participate  Level of consciousness: awake and alert, oriented and awake  Post-procedure vital signs: reviewed and stable  Airway patency: patent  PONV status at discharge: No PONV  Anesthetic complications: no      Cardiovascular status: blood pressure returned to baseline  Respiratory status: unassisted, spontaneous ventilation and room air  Hydration status: euvolemic  Follow-up not needed.        Visit Vitals    /62    Pulse 87    Temp 35.6 °C (96 °F)    Resp 20    Ht 5' 3" (1.6 m)    Wt 92.5 kg (204 lb)    LMP 08/26/2008    SpO2 98%    BMI 36.14 kg/m2       Pain/Reyes Score: Pain Assessment Performed: Yes (4/20/2017  7:00 PM)  Presence of Pain: complains of pain/discomfort (4/20/2017  7:00 PM)  Pain Rating Prior to Med Admin: 5 (4/21/2017  5:50 AM)  Pain Rating Post Med Admin: 2 (4/21/2017 12:27 AM)  Reyes Score: 10 (4/20/2017  6:30 PM)      "

## 2017-04-24 NOTE — DISCHARGE SUMMARY
[facility]  Discharge Summary      Admit Date: 4/20/2017    Discharge Date and Time: 4/21/17    Attending Physician: Lino Anderson MD    Reason for Admission: Macromastia    Procedures Performed: Procedure(s) (LRB):  REDUCTION-MAMMOPLASTY-BILATERAL (Bilateral)    Hospital Course: Ms. Gonzales was admitted to preop and brought to OR for B breast reductions. She had successful surgery and debbie the procedure well. She was taken to recovery in stable condition. She was observed in extended recovery overnight due to significant N/V that resolved by POD1. She was then discharged home after meeting all discharge criteria.    Consults: none    Significant Diagnostic Studies: none    Final Diagnoses:    Principal Problem: Macromastia   Secondary Diagnoses: none    Discharged Condition: stable    Disposition: Home or Self Care    Follow Up/Patient Instructions: f/u With Dr. Anderson in 1 week in Plastics clinic    Medications:  Reconciled Home Medications:   Discharge Medication List as of 4/21/2017 12:37 PM      CONTINUE these medications which have NOT CHANGED    Details   amitriptyline (ELAVIL) 50 MG tablet Take 1 tablet (50 mg total) by mouth every evening. At 6 p.m., Starting 6/13/2016, Until Tue 6/13/17, Normal      butalbital-acetaminophen-caffeine -40 mg (FIORICET, ESGIC) -40 mg per tablet Take 1-2 tablets by mouth every 6 (six) hours as needed for Headaches., Starting 5/6/2016, Until Discontinued, Normal      estradiol (ESTRACE) 1 MG tablet Take 1 mg by mouth once daily. , Starting 7/7/2015, Until Discontinued, Historical Med      hydrOXYzine HCl (ATARAX) 25 MG tablet Take 1 tablet (25 mg total) by mouth every evening., Starting 6/13/2016, Until Discontinued, Normal      methen-m.blue-s.phos-phsal-hyo (URELLE) 81-10.8-40.8 mg Tab Take 1 tablet by mouth 3 (three) times daily., Starting 6/13/2016, Until Discontinued, Normal      oxybutynin (DITROPAN) 5 MG Tab TAKE 1 TABLET BY MOUTH TWO TIMES  DAILY, Normal      promethazine (PHENERGAN) 25 MG tablet Take 1 tablet (25 mg total) by mouth every 6 (six) hours as needed for Nausea., Starting 9/8/2016, Until Discontinued, Print      TAMSULOSIN HCL (FLOMAX ORAL) Take 0.4 mg by mouth once daily., Until Discontinued, Historical Med           No discharge procedures on file.  Follow-up Information     Follow up with Lino Anderson MD In 1 week.    Specialty:  Plastic Surgery    Contact information:    [address] [facility] PLASTIC/RECONSTRUCTIVE SURGERY  3RD FLOOR  [facility] [id number] [phone]

## 2017-05-02 ENCOUNTER — HOSPITAL ENCOUNTER (EMERGENCY)
Facility: HOSPITAL | Age: 42
Discharge: HOME | End: 2017-05-02
Attending: EMERGENCY MEDICINE
Payer: COMMERCIAL

## 2017-05-02 VITALS
TEMPERATURE: 99 F | DIASTOLIC BLOOD PRESSURE: 62 MMHG | RESPIRATION RATE: 18 BRPM | HEART RATE: 101 BPM | BODY MASS INDEX: 34.55 KG/M2 | HEIGHT: 63 IN | WEIGHT: 195 LBS | OXYGEN SATURATION: 96 % | SYSTOLIC BLOOD PRESSURE: 106 MMHG

## 2017-05-02 DIAGNOSIS — S59.902A ELBOW INJURY, LEFT, INITIAL ENCOUNTER: Primary | ICD-10-CM

## 2017-05-02 PROCEDURE — 25000003 PHARM REV CODE 250: Performed by: STUDENT IN AN ORGANIZED HEALTH CARE EDUCATION/TRAINING PROGRAM

## 2017-05-02 PROCEDURE — 96374 THER/PROPH/DIAG INJ IV PUSH: CPT

## 2017-05-02 PROCEDURE — 99285 EMERGENCY DEPT VISIT HI MDM: CPT | Mod: ,,, | Performed by: EMERGENCY MEDICINE

## 2017-05-02 PROCEDURE — 99283 EMERGENCY DEPT VISIT LOW MDM: CPT | Mod: 25

## 2017-05-02 PROCEDURE — 63600175 PHARM REV CODE 636 W HCPCS: Performed by: STUDENT IN AN ORGANIZED HEALTH CARE EDUCATION/TRAINING PROGRAM

## 2017-05-02 RX ORDER — PROMETHAZINE HYDROCHLORIDE 25 MG/1
25 TABLET ORAL
Status: DISCONTINUED | OUTPATIENT
Start: 2017-05-02 | End: 2017-05-02 | Stop reason: HOSPADM

## 2017-05-02 RX ORDER — KETOROLAC TROMETHAMINE 10 MG/1
10 TABLET, FILM COATED ORAL EVERY 6 HOURS
Qty: 15 TABLET | Refills: 0 | Status: SHIPPED | OUTPATIENT
Start: 2017-05-02 | End: 2017-07-25

## 2017-05-02 RX ORDER — OXYCODONE AND ACETAMINOPHEN 5; 325 MG/1; MG/1
2 TABLET ORAL
Status: DISCONTINUED | OUTPATIENT
Start: 2017-05-02 | End: 2017-05-02

## 2017-05-02 RX ORDER — KETOROLAC TROMETHAMINE 30 MG/ML
10 INJECTION, SOLUTION INTRAMUSCULAR; INTRAVENOUS
Status: COMPLETED | OUTPATIENT
Start: 2017-05-02 | End: 2017-05-02

## 2017-05-02 RX ADMIN — KETOROLAC TROMETHAMINE 10 MG: 30 INJECTION, SOLUTION INTRAMUSCULAR at 01:05

## 2017-05-02 NOTE — ED TRIAGE NOTES
Pt states that she tripped yesterday and has had left arm pain since with inability to straighten arm due to pain.  She had breast reduction surgery last week and is recovering from that.  Mild swelling noted to all of left upper arm with no obvious deformity or bruising.  Able to move fingers without pain in hand - cap refill brisk to left hand - describes pain as intense to AC area and medial upper arm

## 2017-05-02 NOTE — ED PROVIDER NOTES
Encounter Date: 2017    SCRIBE #1 NOTE: I, Jose Gray, am scribing for, and in the presence of,  Michael Corley MD. I have scribed the following portions of the note - the Resident attestation.       History     Chief Complaint   Patient presents with    Arm Injury     slipped and caught on wall, told by md has torn bicep, s/p breast reduction on , crying in pain     Review of patient's allergies indicates:   Allergen Reactions    Bactrim [sulfamethoxazole-trimethoprim] Other (See Comments)     headache    Codeine Other (See Comments)     Other reaction(s): Vomiting    Zofran [ondansetron hcl (pf)] Other (See Comments)     constipation     HPI Comments: 42 y/o F PMHx breast reduction surgery last week presents with fall on oustretched hand. Last night had a mechanical fall, falling forward, trying to protect breasts fell onto left hand.  Since then has had 10/10 pain in flexor joint of left arm with radiation into left shoulder. Denies finger weakness, cold, numbness.  Denies shoulder pain.  Did not take any medication for the pain, denies any other injuries including head trauma, LOC    The history is provided by the patient.     Past Medical History:   Diagnosis Date    Anxiety     Dizziness     Interstitial cystitis     Migraine headache      Past Surgical History:   Procedure Laterality Date     SECTION, CLASSIC      COLONOSCOPY N/A 2016    Procedure: COLONOSCOPY;  Surgeon: Phuong Carrillo MD;  Location: [facility];  Service: Endoscopy;  Laterality: N/A;    HYSTERECTOMY      OOPHORECTOMY      TONSILLECTOMY       No family history on file.  Social History   Substance Use Topics    Smoking status: Never Smoker    Smokeless tobacco: Never Used    Alcohol use No     Review of Systems   Constitutional: Negative for chills and fever.   HENT: Negative for congestion and rhinorrhea.    Eyes: Negative for visual disturbance.   Respiratory: Negative for cough, choking, chest  tightness and shortness of breath.    Cardiovascular: Negative for chest pain, palpitations and leg swelling.   Gastrointestinal: Negative for abdominal pain and vomiting.   Endocrine: Negative for polyuria.   Genitourinary: Negative for dysuria and hematuria.   Musculoskeletal: Positive for arthralgias, joint swelling and myalgias. Negative for back pain, gait problem, neck pain and neck stiffness.   Skin: Negative for color change, pallor, rash and wound.   Neurological: Negative for dizziness, seizures, weakness and numbness.       Physical Exam   Initial Vitals   BP Pulse Resp Temp SpO2   05/02/17 1110 05/02/17 1110 05/02/17 1110 05/02/17 1110 05/02/17 1110   129/87 125 18 98.2 °F (36.8 °C) 97 %     Physical Exam    Nursing note and vitals reviewed.  Constitutional: She appears well-developed and well-nourished. She is not diaphoretic. She appears distressed.   HENT:   Head: Normocephalic and atraumatic.   Right Ear: External ear normal.   Left Ear: External ear normal.   Mouth/Throat: Oropharynx is clear and moist. No oropharyngeal exudate.   Eyes: Conjunctivae are normal. Pupils are equal, round, and reactive to light.   Neck: Normal range of motion. No tracheal deviation present. No JVD present.   Cardiovascular: Regular rhythm and normal heart sounds. Exam reveals no gallop and no friction rub.    No murmur heard.  tachycardic   Pulmonary/Chest: Breath sounds normal. No stridor. No respiratory distress. She has no wheezes. She has no rhonchi. She has no rales.   Abdominal: Soft. There is no tenderness.   Musculoskeletal: She exhibits tenderness.        Left elbow: She exhibits decreased range of motion and swelling. She exhibits no effusion, no deformity and no laceration. Tenderness found. Lateral epicondyle tenderness noted. No medial epicondyle and no olecranon process tenderness noted.        Left wrist: She exhibits normal range of motion, no tenderness, no swelling, no crepitus, no deformity and no  laceration.        Left forearm: She exhibits no tenderness and no swelling.        Left hand: She exhibits normal range of motion, no tenderness, normal capillary refill and no deformity. Normal sensation noted. Normal strength noted.   Patient left supination, left lateral head tenderness   Neurological: She is alert and oriented to person, place, and time. She has normal strength. No sensory deficit.   Skin: Skin is warm. No rash noted. No erythema.         ED Course   Procedures  Labs Reviewed - No data to display          Medical Decision Making:   History:   Old Medical Records: I decided to obtain old medical records.  Initial Assessment:   40 y/o F with FOOH, reporting left elbow pain, neurovascularly in tact, with decreased left elbow extension, no other signs of trauma.    DDx: fracture, sprain, strain, compartment syndrome, dislocation    Controlling pain with percocet, will evaluate for fractures with x-ray, re-examine.    MD SAMEER Walker - I  12:10 PM 5/2/2017    X-rays show no obvious fractures, likely sprain of elbow, will discharge with RICE + strict return precautions. Patient agreed to plan of care and expressed understanding.     MD SAMEER Walker - I  1:21 PM 5/2/2017      Independently Interpreted Test(s):   I have ordered and independently interpreted X-rays - see summary below.  Clinical Tests:   Radiological Study: Ordered and Reviewed            Scribe Attestation:   Scribe #1: I performed the above scribed service and the documentation accurately describes the services I performed. I attest to the accuracy of the note.    Attending Attestation:   Physician Attestation Statement for Resident:  As the supervising MD   Physician Attestation Statement: I have personally seen and examined this patient.   I agree with the above history. -: 41 year old female s/p fall yesterday with outstretched arm complains of left elbow pain. She has TTP to left lateral elbow and forearm  proximally, tenderness with supination. No tenderness to olecranon. Concern for radial head fracture. Will get X-rays per resident plan.    As the supervising MD I agree with the above PE.    As the supervising MD I agree with the above treatment, course, plan, and disposition.  I have reviewed and agree with the residents interpretation of the following: x-rays.          Physician Attestation for Scribe:  Physician Attestation Statement for Scribe #1: I, Michael Corley MD, reviewed documentation, as scribed by Jose Gray in my presence, and it is both accurate and complete.         Attending ED Notes:   1:14 PM: Xrays negative for fracture, dislocation, subluxation. Overall impression is elbow injury. Recommend RICE and follow up with PCP in one week.           ED Course     Clinical Impression:   Diagnoses of Accidental fall onto outstretched hand, Accidental fall onto outstretched hand, and Accidental fall onto outstretched hand were pertinent to this visit.    Disposition:   Disposition: Discharged  Condition: Stable       El Tamayo MD  Resident  05/02/17 204

## 2017-05-02 NOTE — ED NOTES
Patient wrap applied by resident - has excellent pulses after application without parethesia or pallor or radiation of pain from affected area

## 2017-05-02 NOTE — ED AVS SNAPSHOT
[facility]  [address] [facility] [address]               Elana Gonzales   2017 11:12 AM   ED    Description:  Female : 1975   Department:  [facility]           Your Care was Coordinated By:     Provider Role From To    Michael Corley MD Attending Provider 17 [id number] --    El Tamayo MD Resident 17 1111 --      Reason for Visit     Arm Injury           Diagnoses this Visit        Comments    Accidental fall onto outstretched hand         Accidental fall onto outstretched hand         Accidental fall onto outstretched hand           ED Disposition     None           To Do List           Follow-up Information     Schedule an appointment as soon as possible for a visit with Nick Reeves Jr, MD.    Specialty:  Family Medicine    Why:  Further Evaluation, As needed    Contact information:    [address] [address]  [phone]          Follow up with [facility].    Specialty:  Emergency Medicine    Why:  As needed, If symptoms worsen    Contact information:    [address] [facility] [address]  [phone]       These Medications        Disp Refills Start End    ketorolac (TORADOL) 10 mg tablet 15 tablet 0 2017     Take 1 tablet (10 mg total) by mouth every 6 (six) hours. - Oral    Pharmacy: [address] #: [phone]         [facility]     [facility] Nurse Care Line -  Assistance  Unless otherwise directed by your provider, please contact [facility], our nurse care line that is available for  assistance.     Registered nurses in the [facility] provide: appointment scheduling, clinical advisement, health education, and other advisory services.  Call: [phone] (toll free)               Medications           Message regarding Medications     Verify the changes and/or  additions to your medication regime listed below are the same as discussed with your clinician today.  If any of these changes or additions are incorrect, please notify your healthcare provider.        START taking these NEW medications        Refills    ketorolac (TORADOL) 10 mg tablet 0    Sig: Take 1 tablet (10 mg total) by mouth every 6 (six) hours.    Class: Print    Route: Oral      These medications were administered today        Dose Freq    promethazine tablet 25 mg 25 mg ED 1 Time    Sig: Take 1 tablet (25 mg total) by mouth ED 1 Time.    Class: Normal    Route: Oral    ketorolac injection 10 mg 10 mg ED 1 Time    Sig: Inject 10 mg into the muscle ED 1 Time.    Class: Normal    Route: Intramuscular           Verify that the below list of medications is an accurate representation of the medications you are currently taking.  If none reported, the list may be blank. If incorrect, please contact your healthcare provider. Carry this list with you in case of emergency.           Current Medications     amitriptyline (ELAVIL) 50 MG tablet Take 1 tablet (50 mg total) by mouth every evening. At 6 p.m.    estradiol (ESTRACE) 1 MG tablet Take 1 mg by mouth once daily.     hydrOXYzine HCl (ATARAX) 25 MG tablet Take 1 tablet (25 mg total) by mouth every evening.    methen-m.blue-s.phos-phsal-hyo (URELLE) 81-10.8-40.8 mg Tab Take 1 tablet by mouth 3 (three) times daily.    oxybutynin (DITROPAN) 5 MG Tab TAKE 1 TABLET BY MOUTH TWO TIMES DAILY    TAMSULOSIN HCL (FLOMAX ORAL) Take 0.4 mg by mouth once daily.    butalbital-acetaminophen-caffeine -40 mg (FIORICET, ESGIC) -40 mg per tablet Take 1-2 tablets by mouth every 6 (six) hours as needed for Headaches.    ketorolac (TORADOL) 10 mg tablet Take 1 tablet (10 mg total) by mouth every 6 (six) hours.    promethazine (PHENERGAN) 25 MG tablet Take 1 tablet (25 mg total) by mouth every 6 (six) hours as needed for Nausea.    promethazine tablet 25 mg Take 1 tablet  "(25 mg total) by mouth ED 1 Time.           Clinical Reference Information           Your Vitals Were     BP Pulse Temp Resp Height Weight    129/87 125 98.2 °F (36.8 °C) (Oral) 18 5' 3" (1.6 m) 88.5 kg (195 lb)    Last Period SpO2 BMI          08/26/2008 97% 34.54 kg/m2        Allergies as of 5/2/2017        Reactions    Bactrim [Sulfamethoxazole-trimethoprim] Other (See Comments)    headache    Codeine Other (See Comments)    Other reaction(s): Vomiting    Zofran [Ondansetron Hcl (Pf)] Other (See Comments)    constipation      Immunizations Administered on Date of Encounter - 5/2/2017     None      ED Micro, Lab, POCT     None      ED Imaging Orders     Start Ordered       Status Ordering Provider    05/02/17 1214 05/02/17 1213  X-Ray Wrist Complete Left  1 time imaging      Final result     05/02/17 1150 05/02/17 1149  X-Ray Elbow Complete Left  1 time imaging      Final result     05/02/17 1150 05/02/17 1149  X-Ray Forearm Left  1 time imaging      Final result     05/02/17 1149 05/02/17 1149  X-Ray Humerus 2 View Left  1 time imaging      Final result       Discharge References/Attachments     SPRAIN, ELBOW (ENGLISH)      [facility] Sign-Up     Activating your [facility] account is as easy as 1-2-3!     1) Visit [email or web address], select Sign Up Now, enter this activation code and your date of birth, then select Next.  [email or web address]  Expires: 6/2/2017 12:03 PM      2) Create a username and password to use when you visit [facility] in the future and select a security question in case you lose your password and select Next.    3) Enter your e-mail address and click Sign Up!    Additional Information  If you have questions, please e-mail [email or web address] or call [phone] to talk to our [facility] staff. Remember, [facility] is NOT to be used for urgent needs. For medical emergencies, dial 911.          [facility] complies with applicable Federal civil rights laws and does not discriminate " on the basis of race, color, national origin, age, disability, or sex.        Language Assistance Services     ATTENTION: Language assistance services are available, free of charge. Please call [phone].      ATENCIÓN: Si alysa moreno, tiene a ortiz disposición servicios gratuitos de asistencia lingüística. Llame al [phone].     CHÚ Ý: N?u b?n nói Ti?ng Vi?t, có các d?ch v? h? tr? ngôn ng? mi?n phí dành cho b?n. G?i s? [phone].

## 2017-05-08 ENCOUNTER — TELEPHONE (OUTPATIENT)
Dept: UROLOGY | Facility: CLINIC | Age: 42
End: 2017-05-08

## 2017-05-08 NOTE — TELEPHONE ENCOUNTER
----- Message from Vianey Jensen MA sent at 5/8/2017  8:39 AM CDT -----  Contact: [phone] Pt is having trouble getting her Urelle filled, they are telling her that it now requires a PA .    [facility] - [place] - [address] [place] [address] 3125 [phone] (Phone)  [phone] (Fax)

## 2017-05-15 DIAGNOSIS — G43.019 INTRACTABLE MIGRAINE WITHOUT AURA AND WITHOUT STATUS MIGRAINOSUS: ICD-10-CM

## 2017-05-15 NOTE — TELEPHONE ENCOUNTER
----- Message from Catia Mata sent at 5/15/2017  4:03 PM CDT -----  Contact: pt [phone]  Pt states she would like to speak with the nurse regarding some medicine. Pt states it is not a refill request and would not give further details. Please call pt.

## 2017-05-16 RX ORDER — AMITRIPTYLINE HYDROCHLORIDE 50 MG/1
50 TABLET, FILM COATED ORAL NIGHTLY
Qty: 30 TABLET | Refills: 11 | Status: SHIPPED | OUTPATIENT
Start: 2017-05-16 | End: 2017-05-19 | Stop reason: DRUGHIGH

## 2017-05-16 NOTE — TELEPHONE ENCOUNTER
----- Message from Kacy Dominguez MA sent at 5/16/2017  8:26 AM CDT -----  Contact: self  [phone]  States she needs Elavil 25 mg called to [facility].    Call when done.    I did notify her of the pending authorization for her other medications.

## 2017-05-19 DIAGNOSIS — N30.10 INTERSTITIAL CYSTITIS: ICD-10-CM

## 2017-05-19 DIAGNOSIS — R30.0 DYSURIA: ICD-10-CM

## 2017-05-19 DIAGNOSIS — G43.019 INTRACTABLE MIGRAINE WITHOUT AURA AND WITHOUT STATUS MIGRAINOSUS: ICD-10-CM

## 2017-05-19 RX ORDER — AMITRIPTYLINE HYDROCHLORIDE 25 MG/1
25 TABLET, FILM COATED ORAL NIGHTLY PRN
COMMUNITY
End: 2017-05-19 | Stop reason: SDUPTHER

## 2017-05-19 RX ORDER — AMITRIPTYLINE HYDROCHLORIDE 25 MG/1
25 TABLET, FILM COATED ORAL NIGHTLY PRN
Qty: 30 TABLET | Refills: 11 | Status: SHIPPED | OUTPATIENT
Start: 2017-05-19 | End: 2017-08-10

## 2017-05-23 NOTE — TELEPHONE ENCOUNTER
Spoke to tech at [facility] -urelle is not on formulary and costs $206.52 a month, prior auth is still pending . Verified with her pharmacy that she has aetna and medicaid insurance

## 2017-05-25 ENCOUNTER — TELEPHONE (OUTPATIENT)
Dept: UROLOGY | Facility: CLINIC | Age: 42
End: 2017-05-25

## 2017-05-25 NOTE — TELEPHONE ENCOUNTER
----- Message from Jordana Aldrihc sent at 5/25/2017 11:29 AM CDT -----  Contact: self - [phone]  Anguiano - calling about urelle - please call patient at [phone]

## 2017-05-30 DIAGNOSIS — N30.10 IC (INTERSTITIAL CYSTITIS): Primary | ICD-10-CM

## 2017-05-30 NOTE — TELEPHONE ENCOUNTER
Unable to get uribel approved and it's $206 month, can she try urimar T-can you send to [facility]?

## 2017-05-30 NOTE — PROGRESS NOTES
IC (interstitial cystitis)  -     dutpek-ewrxu-h.blue-sal-naphos (URIMAR-T) 120-0.12-10.8 mg Tab; Take 1 tablet by mouth 3 (three) times daily.  Dispense: 100 tablet; Refill: 3    Other orders  -     Discontinue: vmaeem-voxed-p.blue-sal-naphos (URIMAR-T) 120-0.12-10.8 mg Tab; Take 1 tablet by mouth 3 (three) times daily.  Dispense: 100 tablet; Refill: 3  -     Discontinue: infjtj-omzxa-j.blue-sal-naphos (URIMAR-T) 120-0.12-10.8 mg Tab; Take 1 tablet by mouth 3 (three) times daily.  Dispense: 100 tablet; Refill: 3

## 2017-06-29 DIAGNOSIS — R33.9 INCOMPLETE BLADDER EMPTYING: ICD-10-CM

## 2017-06-29 RX ORDER — TAMSULOSIN HYDROCHLORIDE 0.4 MG/1
CAPSULE ORAL
Qty: 30 CAPSULE | Refills: 0 | Status: SHIPPED | OUTPATIENT
Start: 2017-06-29 | End: 2017-07-25 | Stop reason: SDUPTHER

## 2017-07-14 RX ORDER — HYDROXYZINE HYDROCHLORIDE 25 MG/1
TABLET, FILM COATED ORAL
Qty: 30 TABLET | Refills: 11 | Status: SHIPPED | OUTPATIENT
Start: 2017-07-14 | End: 2017-07-25 | Stop reason: SDUPTHER

## 2017-07-25 ENCOUNTER — OFFICE VISIT (OUTPATIENT)
Dept: UROLOGY | Facility: CLINIC | Age: 42
End: 2017-07-25
Payer: COMMERCIAL

## 2017-07-25 VITALS
SYSTOLIC BLOOD PRESSURE: 117 MMHG | WEIGHT: 195 LBS | HEIGHT: 63 IN | DIASTOLIC BLOOD PRESSURE: 84 MMHG | HEART RATE: 102 BPM | BODY MASS INDEX: 34.55 KG/M2

## 2017-07-25 DIAGNOSIS — N31.8 FREQUENCY-URGENCY SYNDROME: ICD-10-CM

## 2017-07-25 DIAGNOSIS — R35.1 NOCTURIA: ICD-10-CM

## 2017-07-25 DIAGNOSIS — R33.9 INCOMPLETE BLADDER EMPTYING: ICD-10-CM

## 2017-07-25 DIAGNOSIS — N30.10 INTERSTITIAL CYSTITIS: Primary | ICD-10-CM

## 2017-07-25 PROCEDURE — 87086 URINE CULTURE/COLONY COUNT: CPT

## 2017-07-25 PROCEDURE — 87186 SC STD MICRODIL/AGAR DIL: CPT

## 2017-07-25 PROCEDURE — 87077 CULTURE AEROBIC IDENTIFY: CPT

## 2017-07-25 PROCEDURE — 87088 URINE BACTERIA CULTURE: CPT

## 2017-07-25 PROCEDURE — 99215 OFFICE O/P EST HI 40 MIN: CPT | Mod: 57,S$GLB,, | Performed by: UROLOGY

## 2017-07-25 PROCEDURE — 99999 PR PBB SHADOW E&M-EST. PATIENT-LVL III: CPT | Mod: PBBFAC,,, | Performed by: UROLOGY

## 2017-07-25 RX ORDER — TROSPIUM CHLORIDE 20 MG/1
20 TABLET, FILM COATED ORAL 2 TIMES DAILY
Qty: 60 TABLET | Refills: 11 | Status: SHIPPED | OUTPATIENT
Start: 2017-07-25 | End: 2018-01-23 | Stop reason: SDUPTHER

## 2017-07-25 RX ORDER — AMITRIPTYLINE HYDROCHLORIDE 50 MG/1
50 TABLET, FILM COATED ORAL NIGHTLY
Qty: 30 TABLET | Refills: 11 | Status: SHIPPED | OUTPATIENT
Start: 2017-07-25 | End: 2018-01-23 | Stop reason: SDUPTHER

## 2017-07-25 RX ORDER — HYDROXYZINE HYDROCHLORIDE 25 MG/1
25 TABLET, FILM COATED ORAL NIGHTLY
Qty: 30 TABLET | Refills: 11 | Status: SHIPPED | OUTPATIENT
Start: 2017-07-25 | End: 2018-01-23 | Stop reason: SDUPTHER

## 2017-07-25 RX ORDER — TAMSULOSIN HYDROCHLORIDE 0.4 MG/1
1 CAPSULE ORAL DAILY
Qty: 30 CAPSULE | Refills: 11 | Status: SHIPPED | OUTPATIENT
Start: 2017-07-25 | End: 2017-08-02 | Stop reason: SDUPTHER

## 2017-07-25 NOTE — PROGRESS NOTES
CHIEF COMPLAINT:    Mrs. Gonzales is a 42 y.o. female presenting for increased frequency, urgency, nocturia, and bladder pain.  PRESENTING ILLNESS:    Elana Gonzales is a 42 y.o. female with a PMH of IC who presents for annual exam.   C/o increased frequency, urgency, nocturia 3 to 4 x, and bladder pain.  Currently she only urinates a cupful urine at a time.    Has been on flomax, oxybutynin, hydroxyzine and elavil for hx of IC, frequency, urgency and incomplete bladder emptying.  She was doing well until recently when she received Cipro from her PCP.  She also cut down Elavil because of worsening memory.    Since then, she has experienced worsening bladder pain with increased frequency, urgency and nocturia 3 to 4x.  Denies dysuria.  She had a cysto and hydro-distention in the past.  During cysto, her initial bladder capacity was 300 cc and was increased up to 550 cc after distention.    REVIEW OF SYSTEMS:  Constitutional: Negative for fever and chills.   HENT: Negative for hearing loss.   Eyes: Negative for visual disturbance.   Respiratory: Negative for shortness of breath.   Cardiovascular: Negative for chest pain.   Gastrointestinal: Positive for nausea.   Negative for  vomiting, and constipation.   Genitourinary:  Negative for urgency, frequency, difficulty urinating, nocturia, incontinence, dysuria, hematuria, intermittency, hesitancy, incomplete bladder emptying, and decreased urine volume.   Neurological: Positive for dizziness.   Hematological: Does not bruise/bleed easily.   Psychiatric/Behavioral: Negative for confusion.     PATIENT HISTORY:    Past Medical History:   Diagnosis Date    Anxiety     Dizziness     Interstitial cystitis     Migraine headache        Past Surgical History:   Procedure Laterality Date     SECTION, CLASSIC      COLONOSCOPY N/A 2016    Procedure: COLONOSCOPY;  Surgeon: Phuong Carrillo MD;  Location: [facility];  Service: Endoscopy;  Laterality: N/A;     HYSTERECTOMY      OOPHORECTOMY      TONSILLECTOMY         No family history on file.    Social History     Social History    Marital status:      Spouse name: N/A    Number of children: N/A    Years of education: N/A     Occupational History    Not on file.     Social History Main Topics    Smoking status: Never Smoker    Smokeless tobacco: Never Used    Alcohol use No    Drug use: No    Sexual activity: Yes     Partners: Male     Other Topics Concern    Not on file     Social History Narrative    No narrative on file       Allergies:  Bactrim [sulfamethoxazole-trimethoprim]; Codeine; and Zofran [ondansetron hcl (pf)]    Medications:    Current Outpatient Prescriptions:     amitriptyline (ELAVIL) 25 MG tablet, Take 1 tablet (25 mg total) by mouth nightly as needed for Insomnia., Disp: 30 tablet, Rfl: 11    butalbital-acetaminophen-caffeine -40 mg (FIORICET, ESGIC) -40 mg per tablet, Take 1-2 tablets by mouth every 6 (six) hours as needed for Headaches., Disp: 50 tablet, Rfl: 1    estradiol (ESTRACE) 1 MG tablet, Take 1 mg by mouth once daily. , Disp: , Rfl:     FLOMAX 0.4 mg Cp24, Take 1 capsule (0.4 mg total) by mouth once daily., Disp: 30 capsule, Rfl: 11    hydrOXYzine HCl (ATARAX) 25 MG tablet, Take 1 tablet (25 mg total) by mouth every evening., Disp: 30 tablet, Rfl: 11    oxybutynin (DITROPAN) 5 MG Tab, TAKE 1 TABLET BY MOUTH TWO TIMES DAILY, Disp: 60 tablet, Rfl: 12    amitriptyline (ELAVIL) 50 MG tablet, Take 1 tablet (50 mg total) by mouth every evening., Disp: 30 tablet, Rfl: 11    trospium (SANCTURA) 20 mg Tab tablet, Take 1 tablet (20 mg total) by mouth 2 (two) times daily. Take it in an empty stomach, Disp: 60 tablet, Rfl: 11    PHYSICAL EXAMINATION:    Constitutional: She is oriented to person, place, and time. She appears well-developed and well-nourished.  She is in no apparent distress.    Neck: No tracheal deviation present.     Cardiovascular: Normal rate.       Pulmonary/Chest: Effort normal. No respiratory distress.     Abdominal:  She exhibits no distension.  There is no CVA tenderness.     Lymphadenopathy:          Right: No supraclavicular adenopathy present.        Left: No supraclavicular adenopathy present.     Neurological: She is alert and oriented to person, place, and time.     Skin: Skin is warm and dry.     Extremities: No pitting edema noted in lower extremities bilaterally    Psych: Cooperative with normal affect.    Physical Exam   Constitutional: She is oriented to person, place, and time. She appears well-developed and well-nourished. No distress.   HENT:   Head: Normocephalic and atraumatic.   Right Ear: External ear normal.   Left Ear: External ear normal.   Nose: Nose normal.   Eyes: Conjunctivae and EOM are normal. Pupils are equal, round, and reactive to light. No scleral icterus.   Neck: Normal range of motion. Neck supple. No JVD present. No tracheal deviation present. No thyromegaly present.   Cardiovascular: Normal rate, regular rhythm, normal heart sounds and intact distal pulses.  Exam reveals no gallop and no friction rub.    No murmur heard.  Pulmonary/Chest: Effort normal and breath sounds normal. No respiratory distress. She has no wheezes.   Abdominal: Soft. Bowel sounds are normal. She exhibits no distension and no mass. There is no tenderness. There is no rebound and no guarding.   Genitourinary: Vagina normal and uterus normal. No vaginal discharge found.   Musculoskeletal: Normal range of motion. She exhibits no edema, tenderness or deformity.   Lymphadenopathy:     She has no cervical adenopathy.   Neurological: She is alert and oriented to person, place, and time.   Skin: Skin is warm and dry. She is not diaphoretic.     Psychiatric: She has a normal mood and affect. Her behavior is normal. Thought content normal.         LABS:    U/a: 1.010, pH 6, negative.     IMPRESSION:    Encounter Diagnoses   Name Primary?    Interstitial  cystitis Yes    Frequency-urgency syndrome     Incomplete bladder emptying     Nocturia    Elana was seen today for urinary urgency and urinary frequency.    Diagnoses and all orders for this visit:    Interstitial cystitis  -     POCT urine dipstick without microscope  -     Urine culture  -     hydrOXYzine HCl (ATARAX) 25 MG tablet; Take 1 tablet (25 mg total) by mouth every evening.  -     amitriptyline (ELAVIL) 50 MG tablet; Take 1 tablet (50 mg total) by mouth every evening.    Frequency-urgency syndrome  -     trospium (SANCTURA) 20 mg Tab tablet; Take 1 tablet (20 mg total) by mouth 2 (two) times daily. Take it in an empty stomach    Incomplete bladder emptying  -     FLOMAX 0.4 mg Cp24; Take 1 capsule (0.4 mg total) by mouth once daily.    Nocturia        PLAN:  Take Trospium instead of oxybutynin because of worsening memory.  Continue flomax, hydroxyzine, and elavil 50 mg q hs.  Will schedule her for cysto, hydrodistention, bladder instillation, botox injection ( 100 units).  Continue IC smart diet.  Consider drinking alkaline water, Evamor instead of other water.    I spent 40 minutes with the patient of which more than half was spent in direct consultation with the patient in regards to our treatment and plan.    Follow up:  Return for cysto, hydrodistention, bladder instillation, botox injection.

## 2017-07-27 ENCOUNTER — TELEPHONE (OUTPATIENT)
Dept: UROLOGY | Facility: CLINIC | Age: 42
End: 2017-07-27

## 2017-07-27 DIAGNOSIS — R39.89 BLADDER PAIN: ICD-10-CM

## 2017-07-27 DIAGNOSIS — R35.1 NOCTURIA: ICD-10-CM

## 2017-07-27 DIAGNOSIS — N31.8 FREQUENCY-URGENCY SYNDROME: ICD-10-CM

## 2017-07-27 DIAGNOSIS — N30.10 INTERSTITIAL CYSTITIS: Primary | ICD-10-CM

## 2017-07-27 NOTE — TELEPHONE ENCOUNTER
Diagnoses and all orders for this visit:    Interstitial cystitis  -     Case Request Operating Room: CYSTOSCOPY WITH HYDRODISTENSION, INJECTION-BOTOX 100 UNITS, INSTILLATION-BLADDER    Frequency-urgency syndrome  -     Case Request Operating Room: CYSTOSCOPY WITH HYDRODISTENSION, INJECTION-BOTOX 100 UNITS, INSTILLATION-BLADDER    Nocturia  -     Case Request Operating Room: CYSTOSCOPY WITH HYDRODISTENSION, INJECTION-BOTOX 100 UNITS, INSTILLATION-BLADDER    Bladder pain  -     Case Request Operating Room: CYSTOSCOPY WITH HYDRODISTENSION, INJECTION-BOTOX 100 UNITS, INSTILLATION-BLADDER

## 2017-07-28 ENCOUNTER — TELEPHONE (OUTPATIENT)
Dept: UROLOGY | Facility: CLINIC | Age: 42
End: 2017-07-28

## 2017-07-28 DIAGNOSIS — N39.0 ACUTE UTI: ICD-10-CM

## 2017-07-28 LAB
BACTERIA UR CULT: NORMAL
BACTERIA UR CULT: NORMAL

## 2017-07-28 RX ORDER — NITROFURANTOIN 25; 75 MG/1; MG/1
100 CAPSULE ORAL 2 TIMES DAILY
Qty: 20 CAPSULE | Refills: 0 | Status: SHIPPED | OUTPATIENT
Start: 2017-07-28 | End: 2017-08-07

## 2017-07-28 NOTE — TELEPHONE ENCOUNTER
Diagnoses and all orders for this visit:    Acute UTI  -     nitrofurantoin, macrocrystal-monohydrate, (MACROBID) 100 MG capsule; Take 1 capsule (100 mg total) by mouth 2 (two) times daily.    eRxed to the pharmacy.  Please call and advise the patient to take the medication as given.

## 2017-07-28 NOTE — TELEPHONE ENCOUNTER
----- Message from Yong Anguiano MD sent at 7/28/2017  2:42 PM CDT -----  Please call the patient regarding abnormal test results.  macrobid twice a day for 10 days.

## 2017-08-01 ENCOUNTER — TELEPHONE (OUTPATIENT)
Dept: UROLOGY | Facility: CLINIC | Age: 42
End: 2017-08-01

## 2017-08-01 NOTE — TELEPHONE ENCOUNTER
----- Message from Jordana Valdemar sent at 8/1/2017  9:53 AM CDT -----  Contact: self - [phone]  Anguiano - has questions re her flomax  - please call patient at [phone]

## 2017-08-01 NOTE — TELEPHONE ENCOUNTER
flomax requires prior auth. Patient primary insurance is [employer]. PA form completed via cover my meds and pending ref# [id number]

## 2017-08-02 DIAGNOSIS — R33.9 INCOMPLETE BLADDER EMPTYING: ICD-10-CM

## 2017-08-02 RX ORDER — TAMSULOSIN HYDROCHLORIDE 0.4 MG/1
1 CAPSULE ORAL DAILY
Qty: 90 CAPSULE | Refills: 3 | Status: SHIPPED | OUTPATIENT
Start: 2017-08-02 | End: 2018-01-23 | Stop reason: SDUPTHER

## 2017-08-02 NOTE — TELEPHONE ENCOUNTER
Pt's. insurance requires her Flomax to be a 90 day Rx and she has to use  [facility] at [phone].       Please Do not send it the [facility] pharmacy that's is her chart.     Please notify pt when done [phone]

## 2017-08-03 ENCOUNTER — TELEPHONE (OUTPATIENT)
Dept: UROLOGY | Facility: CLINIC | Age: 42
End: 2017-08-03

## 2017-08-03 NOTE — TELEPHONE ENCOUNTER
I called [facility] to get a cash price on brand name flomax as her insurance will not cover. The pharmacist states that they actually spoke to her yesterday as the cash price was over $300. They transferred the rx to [facility] as her insurance will cover it better

## 2017-08-10 ENCOUNTER — ANESTHESIA EVENT (OUTPATIENT)
Dept: SURGERY | Facility: HOSPITAL | Age: 42
End: 2017-08-10
Payer: COMMERCIAL

## 2017-08-10 NOTE — PRE ADMISSION SCREENING
Anesthesia Assessment: Preoperative EQUATION    Planned Procedure: Procedure(s) (LRB):  CYSTOSCOPY WITH HYDRODISTENSION (N/A)  INJECTION-BOTOX 100 UNITS (N/A)  INSTILLATION-BLADDER (N/A)  Requested Anesthesia Type:General  Surgeon: Yong Anguiano MD  Service: Urology  Known or anticipated Date of Surgery:8/16/2017    Surgeon notes: reviewed    Electronic QUestionnaire Assessment completed via nurse interview with patient.        NO AQ        Triage considerations:     Denies any heart history, respiratory history, or problems with anesthesia  [facility]    Last PCP note: within 3 months , outside [place]        Other important co-morbidities: Nocturia/ Bladder pain/ migraies/ anxiety     Tests already available:  last lab            Instructions given. (See in Nurse's note)    Optimization:  Update lab  Plan:    Testing:  BMP  On arrival     Navigation: Tests Scheduled.                  Needs bmp on arrival , states gets around independently, last cbc in April. Recently finished bactrim for uti.

## 2017-08-10 NOTE — ANESTHESIA PREPROCEDURE EVALUATION
Anesthesia Assessment: Preoperative EQUATION     Planned Procedure: Procedure(s) (LRB):  CYSTOSCOPY WITH HYDRODISTENSION (N/A)  INJECTION-BOTOX 100 UNITS (N/A)  INSTILLATION-BLADDER (N/A)  Requested Anesthesia Type:General  Surgeon: Yong Anguiano MD  Service: Urology  Known or anticipated Date of Surgery:8/16/2017     Surgeon notes: reviewed     Electronic QUestionnaire Assessment completed via nurse interview with patient.         NO AQ           Triage considerations:      Denies any heart history, respiratory history, or problems with anesthesia  Previous anesthesia records:[facility] 4/20/17     Last PCP note: within 3 months , outside [facility] [place]          Other important co-morbidities: Nocturia/ Bladder pain/ migraies/ anxiety     Tests already available:  last lab                                                Instructions given. (See in Nurse's note)     Optimization:  Update lab  Plan:              Testing:  BMP  On arrival     Navigation: Tests Scheduled.                                       Needs bmp on arrival , states gets around independently, last cbc in April. Recently finished bactrim for uti.                                                                                                                                         08/10/2017  Elana Gonzales is a 42 y.o., female.    Anesthesia Evaluation    I have reviewed the Patient Summary Reports.    I have reviewed the Nursing Notes.   I have reviewed the Medications.     Review of Systems  Anesthesia Hx:  No problems with previous Anesthesia  History of prior surgery of interest to airway management or planning: Previous anesthesia: General   Social:  Non-Smoker, No Alcohol Use    Hematology/Oncology:  Hematology Normal   Oncology Normal     EENT/Dental:EENT/Dental Normal   Cardiovascular:  Cardiovascular Normal     Pulmonary:  Pulmonary Normal    Renal/:   Hx of interstial cystitis  Bladder pain  nocturia   Hepatic/GI:  Hepatic/GI Normal     Musculoskeletal:  Musculoskeletal Normal    Neurological:   Headaches Hx of migraines  dizziness   Endocrine:  Endocrine Normal    Dermatological:  Skin Normal    Psych:   anxiety          Physical Exam  General:  Obesity    Airway/Jaw/Neck:  Airway Findings: Mouth Opening: Normal Tongue: Normal  General Airway Assessment: Adult  Mallampati: II  TM Distance: Normal, at least 6 cm       Chest/Lungs:  Chest/Lungs Findings: Clear to auscultation, Normal Respiratory Rate     Heart/Vascular:  Heart Findings: Rate: Normal  Rhythm: Regular Rhythm             Anesthesia Plan  Type of Anesthesia, risks & benefits discussed:  Anesthesia Type:  general  Patient's Preference:   Intra-op Monitoring Plan:   Intra-op Monitoring Plan Comments:   Post Op Pain Control Plan: multimodal analgesia  Post Op Pain Control Plan Comments:   Induction:   IV  Beta Blocker:  Patient is not currently on a Beta-Blocker (No further documentation required).       Informed Consent: Patient understands risks and agrees with Anesthesia plan.  Questions answered. Anesthesia consent signed with patient.  ASA Score: 2     Day of Surgery Review of History & Physical:            Ready For Surgery From Anesthesia Perspective.

## 2017-08-15 ENCOUNTER — TELEPHONE (OUTPATIENT)
Dept: UROLOGY | Facility: CLINIC | Age: 42
End: 2017-08-15

## 2017-08-15 NOTE — TELEPHONE ENCOUNTER
Called pt to confirm 7:30am arrival time for procedure. Gave pt NPO instructions and gave pt opportunity to ask questions. Pt verbalized understanding.

## 2017-08-16 ENCOUNTER — HOSPITAL ENCOUNTER (OUTPATIENT)
Facility: HOSPITAL | Age: 42
Discharge: HOME | End: 2017-08-16
Attending: UROLOGY | Admitting: UROLOGY
Payer: COMMERCIAL

## 2017-08-16 ENCOUNTER — ANESTHESIA (OUTPATIENT)
Dept: SURGERY | Facility: HOSPITAL | Age: 42
End: 2017-08-16
Payer: COMMERCIAL

## 2017-08-16 DIAGNOSIS — N30.10 INTERSTITIAL CYSTITIS: Primary | ICD-10-CM

## 2017-08-16 DIAGNOSIS — N30.10 IC (INTERSTITIAL CYSTITIS): ICD-10-CM

## 2017-08-16 DIAGNOSIS — R33.9 INCOMPLETE BLADDER EMPTYING: ICD-10-CM

## 2017-08-16 PROCEDURE — 25000003 PHARM REV CODE 250: Performed by: UROLOGY

## 2017-08-16 PROCEDURE — D9220A PRA ANESTHESIA: Mod: ANES,,, | Performed by: ANESTHESIOLOGY

## 2017-08-16 PROCEDURE — 37000008 HC ANESTHESIA 1ST 15 MINUTES: Performed by: UROLOGY

## 2017-08-16 PROCEDURE — S0020 INJECTION, BUPIVICAINE HYDRO: HCPCS | Performed by: UROLOGY

## 2017-08-16 PROCEDURE — 36000706: Performed by: UROLOGY

## 2017-08-16 PROCEDURE — 63600175 PHARM REV CODE 636 W HCPCS: Performed by: ANESTHESIOLOGY

## 2017-08-16 PROCEDURE — 36000707: Performed by: UROLOGY

## 2017-08-16 PROCEDURE — 37000009 HC ANESTHESIA EA ADD 15 MINS: Performed by: UROLOGY

## 2017-08-16 PROCEDURE — 51700 IRRIGATION OF BLADDER: CPT | Mod: 51,,, | Performed by: UROLOGY

## 2017-08-16 PROCEDURE — 71000016 HC POSTOP RECOV ADDL HR: Performed by: UROLOGY

## 2017-08-16 PROCEDURE — 25000003 PHARM REV CODE 250: Performed by: ANESTHESIOLOGY

## 2017-08-16 PROCEDURE — 25000003 PHARM REV CODE 250: Performed by: NURSE ANESTHETIST, CERTIFIED REGISTERED

## 2017-08-16 PROCEDURE — 63600175 PHARM REV CODE 636 W HCPCS: Performed by: UROLOGY

## 2017-08-16 PROCEDURE — 87086 URINE CULTURE/COLONY COUNT: CPT

## 2017-08-16 PROCEDURE — 71000015 HC POSTOP RECOV 1ST HR: Performed by: UROLOGY

## 2017-08-16 PROCEDURE — 52260 CYSTOSCOPY AND TREATMENT: CPT | Mod: ,,, | Performed by: UROLOGY

## 2017-08-16 PROCEDURE — 25000003 PHARM REV CODE 250

## 2017-08-16 PROCEDURE — 63600175 PHARM REV CODE 636 W HCPCS: Performed by: NURSE ANESTHETIST, CERTIFIED REGISTERED

## 2017-08-16 PROCEDURE — 71000033 HC RECOVERY, INTIAL HOUR: Performed by: UROLOGY

## 2017-08-16 PROCEDURE — 52287 CYSTOSCOPY CHEMODENERVATION: CPT | Mod: 51,,, | Performed by: UROLOGY

## 2017-08-16 PROCEDURE — D9220A PRA ANESTHESIA: Mod: CRNA,,, | Performed by: NURSE ANESTHETIST, CERTIFIED REGISTERED

## 2017-08-16 RX ORDER — KETAMINE HYDROCHLORIDE 100 MG/ML
INJECTION, SOLUTION INTRAMUSCULAR; INTRAVENOUS
Status: DISCONTINUED | OUTPATIENT
Start: 2017-08-16 | End: 2017-08-16

## 2017-08-16 RX ORDER — HEPARIN SODIUM 1000 [USP'U]/ML
INJECTION, SOLUTION INTRAVENOUS; SUBCUTANEOUS
Status: DISCONTINUED | OUTPATIENT
Start: 2017-08-16 | End: 2017-08-16 | Stop reason: HOSPADM

## 2017-08-16 RX ORDER — LIDOCAINE HCL/PF 100 MG/5ML
SYRINGE (ML) INTRAVENOUS
Status: DISCONTINUED | OUTPATIENT
Start: 2017-08-16 | End: 2017-08-16

## 2017-08-16 RX ORDER — OXYCODONE AND ACETAMINOPHEN 5; 325 MG/1; MG/1
1 TABLET ORAL EVERY 4 HOURS PRN
Status: DISCONTINUED | OUTPATIENT
Start: 2017-08-16 | End: 2017-08-16 | Stop reason: HOSPADM

## 2017-08-16 RX ORDER — MIDAZOLAM HYDROCHLORIDE 1 MG/ML
INJECTION, SOLUTION INTRAMUSCULAR; INTRAVENOUS
Status: DISCONTINUED | OUTPATIENT
Start: 2017-08-16 | End: 2017-08-16

## 2017-08-16 RX ORDER — SCOLOPAMINE TRANSDERMAL SYSTEM 1 MG/1
1 PATCH, EXTENDED RELEASE TRANSDERMAL ONCE
Status: COMPLETED | OUTPATIENT
Start: 2017-08-16 | End: 2017-08-16

## 2017-08-16 RX ORDER — LIDOCAINE HYDROCHLORIDE 10 MG/ML
INJECTION INFILTRATION; PERINEURAL
Status: DISCONTINUED
Start: 2017-08-16 | End: 2017-08-16 | Stop reason: HOSPADM

## 2017-08-16 RX ORDER — HEPARIN SODIUM 1000 [USP'U]/ML
INJECTION, SOLUTION INTRAVENOUS; SUBCUTANEOUS
Status: DISCONTINUED
Start: 2017-08-16 | End: 2017-08-16 | Stop reason: HOSPADM

## 2017-08-16 RX ORDER — SODIUM CHLORIDE 0.9 % (FLUSH) 0.9 %
3 SYRINGE (ML) INJECTION
Status: DISCONTINUED | OUTPATIENT
Start: 2017-08-16 | End: 2017-08-16 | Stop reason: HOSPADM

## 2017-08-16 RX ORDER — HYDROMORPHONE HYDROCHLORIDE 1 MG/ML
0.2 INJECTION, SOLUTION INTRAMUSCULAR; INTRAVENOUS; SUBCUTANEOUS EVERY 5 MIN PRN
Status: DISCONTINUED | OUTPATIENT
Start: 2017-08-16 | End: 2017-08-16 | Stop reason: HOSPADM

## 2017-08-16 RX ORDER — SCOLOPAMINE TRANSDERMAL SYSTEM 1 MG/1
PATCH, EXTENDED RELEASE TRANSDERMAL
Status: COMPLETED
Start: 2017-08-16 | End: 2017-08-16

## 2017-08-16 RX ORDER — NITROFURANTOIN 25; 75 MG/1; MG/1
100 CAPSULE ORAL 2 TIMES DAILY
Qty: 6 CAPSULE | Refills: 0 | OUTPATIENT
Start: 2017-08-16 | End: 2017-08-16

## 2017-08-16 RX ORDER — LIDOCAINE HYDROCHLORIDE 10 MG/ML
INJECTION, SOLUTION EPIDURAL; INFILTRATION; INTRACAUDAL; PERINEURAL
Status: DISCONTINUED | OUTPATIENT
Start: 2017-08-16 | End: 2017-08-16 | Stop reason: HOSPADM

## 2017-08-16 RX ORDER — SODIUM BICARBONATE 42 MG/ML
INJECTION, SOLUTION INTRAVENOUS
Status: DISCONTINUED
Start: 2017-08-16 | End: 2017-08-16 | Stop reason: HOSPADM

## 2017-08-16 RX ORDER — BUPIVACAINE HYDROCHLORIDE 5 MG/ML
INJECTION, SOLUTION EPIDURAL; INTRACAUDAL
Status: DISCONTINUED | OUTPATIENT
Start: 2017-08-16 | End: 2017-08-16 | Stop reason: HOSPADM

## 2017-08-16 RX ORDER — OXYCODONE AND ACETAMINOPHEN 5; 325 MG/1; MG/1
1 TABLET ORAL EVERY 4 HOURS PRN
Qty: 31 TABLET | Refills: 0 | Status: SHIPPED | OUTPATIENT
Start: 2017-08-16 | End: 2018-04-13

## 2017-08-16 RX ORDER — LIDOCAINE HYDROCHLORIDE 20 MG/ML
INJECTION, SOLUTION INFILTRATION; PERINEURAL
Status: DISCONTINUED
Start: 2017-08-16 | End: 2017-08-16 | Stop reason: HOSPADM

## 2017-08-16 RX ORDER — PROPOFOL 10 MG/ML
VIAL (ML) INTRAVENOUS CONTINUOUS PRN
Status: DISCONTINUED | OUTPATIENT
Start: 2017-08-16 | End: 2017-08-16

## 2017-08-16 RX ORDER — OXYCODONE AND ACETAMINOPHEN 10; 325 MG/1; MG/1
1 TABLET ORAL EVERY 4 HOURS PRN
Status: DISCONTINUED | OUTPATIENT
Start: 2017-08-16 | End: 2017-08-16 | Stop reason: HOSPADM

## 2017-08-16 RX ORDER — PROPOFOL 10 MG/ML
VIAL (ML) INTRAVENOUS
Status: DISCONTINUED | OUTPATIENT
Start: 2017-08-16 | End: 2017-08-16

## 2017-08-16 RX ORDER — NITROFURANTOIN 25; 75 MG/1; MG/1
100 CAPSULE ORAL 2 TIMES DAILY
Qty: 6 CAPSULE | Refills: 0 | Status: SHIPPED | OUTPATIENT
Start: 2017-08-16 | End: 2017-08-19

## 2017-08-16 RX ORDER — SODIUM CHLORIDE 9 MG/ML
INJECTION, SOLUTION INTRAVENOUS CONTINUOUS
Status: DISCONTINUED | OUTPATIENT
Start: 2017-08-16 | End: 2017-08-16 | Stop reason: HOSPADM

## 2017-08-16 RX ORDER — OXYCODONE AND ACETAMINOPHEN 5; 325 MG/1; MG/1
1 TABLET ORAL EVERY 4 HOURS PRN
Qty: 31 TABLET | Refills: 0 | Status: SHIPPED | OUTPATIENT
Start: 2017-08-16 | End: 2017-08-16

## 2017-08-16 RX ORDER — BUPIVACAINE HYDROCHLORIDE 5 MG/ML
INJECTION, SOLUTION EPIDURAL; INTRACAUDAL
Status: DISCONTINUED
Start: 2017-08-16 | End: 2017-08-16 | Stop reason: HOSPADM

## 2017-08-16 RX ORDER — FENTANYL CITRATE 50 UG/ML
INJECTION, SOLUTION INTRAMUSCULAR; INTRAVENOUS
Status: DISCONTINUED | OUTPATIENT
Start: 2017-08-16 | End: 2017-08-16

## 2017-08-16 RX ADMIN — MIDAZOLAM HYDROCHLORIDE 2 MG: 1 INJECTION, SOLUTION INTRAMUSCULAR; INTRAVENOUS at 09:08

## 2017-08-16 RX ADMIN — PROMETHAZINE HYDROCHLORIDE 6.25 MG: 25 INJECTION INTRAMUSCULAR; INTRAVENOUS at 11:08

## 2017-08-16 RX ADMIN — FENTANYL CITRATE 50 MCG: 50 INJECTION, SOLUTION INTRAMUSCULAR; INTRAVENOUS at 10:08

## 2017-08-16 RX ADMIN — KETAMINE HYDROCHLORIDE 25 MG: 100 INJECTION, SOLUTION, CONCENTRATE INTRAMUSCULAR; INTRAVENOUS at 10:08

## 2017-08-16 RX ADMIN — LIDOCAINE HYDROCHLORIDE 100 MG: 20 INJECTION, SOLUTION INTRAVENOUS at 10:08

## 2017-08-16 RX ADMIN — PROPOFOL 50 MG: 10 INJECTION, EMULSION INTRAVENOUS at 10:08

## 2017-08-16 RX ADMIN — SCOPALAMINE 1.5 MG: 1 PATCH, EXTENDED RELEASE TRANSDERMAL at 08:08

## 2017-08-16 RX ADMIN — SODIUM CHLORIDE: 0.9 INJECTION, SOLUTION INTRAVENOUS at 08:08

## 2017-08-16 RX ADMIN — ERTAPENEM SODIUM 1 G: 1 INJECTION, POWDER, LYOPHILIZED, FOR SOLUTION INTRAMUSCULAR; INTRAVENOUS at 09:08

## 2017-08-16 RX ADMIN — VANCOMYCIN HYDROCHLORIDE 1000 MG: 1 INJECTION, POWDER, LYOPHILIZED, FOR SOLUTION INTRAVENOUS at 08:08

## 2017-08-16 RX ADMIN — SCOLOPAMINE TRANSDERMAL SYSTEM 1.5 MG: 1 PATCH, EXTENDED RELEASE TRANSDERMAL at 08:08

## 2017-08-16 RX ADMIN — OXYCODONE HYDROCHLORIDE AND ACETAMINOPHEN 1 TABLET: 10; 325 TABLET ORAL at 11:08

## 2017-08-16 RX ADMIN — PROPOFOL 250 MCG/KG/MIN: 10 INJECTION, EMULSION INTRAVENOUS at 10:08

## 2017-08-16 NOTE — INTERVAL H&P NOTE
The patient has been examined and the H&P has been reviewed:    I concur with the findings and no changes have occurred since H&P was written.    UA today: trace protein, trace blood, otherwise negative    Anesthesia/Surgery risks, benefits and alternative options discussed and understood by patient/family.          Active Hospital Problems    Diagnosis  POA    IC (interstitial cystitis) [N30.10]  Yes      Resolved Hospital Problems    Diagnosis Date Resolved POA   No resolved problems to display.

## 2017-08-16 NOTE — DISCHARGE SUMMARY
[facility]  Discharge Note  Short Stay    Admit Date: 8/16/2017    Discharge Date and Time:   08/16/2017  10:55 AM       Attending Physician: Yong Anguiano MD     Discharge Provider: Carmella Parker    Diagnoses:  Active Hospital Problems    Diagnosis  POA    *IC (interstitial cystitis) [N30.10]  Yes    Intractable migraine without aura and without status migrainosus [G43.019]  Yes    Migraine without aura, with intractable migraine, so stated, without mention of status migrainosus [G43.019]  Yes    Anxiety state [F41.1]  Yes    Memory loss [R41.3]  Yes    Incomplete bladder emptying [R33.9]  Yes    Right flank pain [R10.9]  Yes    Interstitial cystitis [N30.10]  Yes      Resolved Hospital Problems    Diagnosis Date Resolved POA   No resolved problems to display.       Discharged Condition: good    Hospital Course: Patient was admitted for an outpatient procedure and tolerated the procedure well with no complications.    Final Diagnoses: Same as principal problem.    Disposition: Home or Self Care    Follow up/Patient Instructions:    Medications:  Reconciled Home Medications:   Current Discharge Medication List      START taking these medications    Details   nitrofurantoin, macrocrystal-monohydrate, (MACROBID) 100 MG capsule Take 1 capsule (100 mg total) by mouth 2 (two) times daily.  Qty: 6 capsule, Refills: 0      oxycodone-acetaminophen (PERCOCET) 5-325 mg per tablet Take 1 tablet by mouth every 4 (four) hours as needed.  Qty: 31 tablet, Refills: 0         CONTINUE these medications which have NOT CHANGED    Details   amitriptyline (ELAVIL) 50 MG tablet Take 1 tablet (50 mg total) by mouth every evening.  Qty: 30 tablet, Refills: 11    Associated Diagnoses: Interstitial cystitis      butalbital-acetaminophen-caffeine -40 mg (FIORICET, ESGIC) -40 mg per tablet Take 1-2 tablets by mouth every 6 (six) hours as needed for Headaches.  Qty: 50 tablet, Refills: 1    Associated Diagnoses:  Intractable migraine without aura and without status migrainosus      estradiol (ESTRACE) 1 MG tablet Take 1 mg by mouth once daily.       FLOMAX 0.4 mg Cp24 Take 1 capsule (0.4 mg total) by mouth once daily.  Qty: 90 capsule, Refills: 03    Associated Diagnoses: Incomplete bladder emptying      hydrOXYzine HCl (ATARAX) 25 MG tablet Take 1 tablet (25 mg total) by mouth every evening.  Qty: 30 tablet, Refills: 11    Comments: Generic For:ATARAX 25MG  Associated Diagnoses: Interstitial cystitis      trospium (SANCTURA) 20 mg Tab tablet Take 1 tablet (20 mg total) by mouth 2 (two) times daily. Take it in an empty stomach  Qty: 60 tablet, Refills: 11    Associated Diagnoses: Frequency-urgency syndrome             Discharge Procedure Orders  Diet general     Activity as tolerated     Call MD for:  temperature >100.4     Call MD for:  persistent nausea and vomiting or diarrhea     Call MD for:  severe uncontrolled pain       Follow-up Information     Yong Anguiano MD. Schedule an appointment as soon as possible for a visit in 3 months.    Specialty:  Urology  Contact information:  [facility] [address] [id number] [phone]                   Discharge Procedure Orders (must include Diet, Follow-up, Activity):    Discharge Procedure Orders (must include Diet, Follow-up, Activity)  Diet general     Activity as tolerated     Call MD for:  temperature >100.4     Call MD for:  persistent nausea and vomiting or diarrhea     Call MD for:  severe uncontrolled pain

## 2017-08-16 NOTE — DISCHARGE INSTRUCTIONS

## 2017-08-16 NOTE — PLAN OF CARE
D/C instructions given to pt. and family. Verb. Understanding. RX for pain given and next time and dose explained. Pt. Tolerating PO fluids. VSS. IV dc'd. Pain level tolerable. Pt. Denies N/V at this time. Family at  for d/c.

## 2017-08-16 NOTE — H&P (VIEW-ONLY)
CHIEF COMPLAINT:    Mrs. Gonzales is a 42 y.o. female presenting for increased frequency, urgency, nocturia, and bladder pain.  PRESENTING ILLNESS:    Elana Gonzales is a 42 y.o. female with a PMH of IC who presents for annual exam.   C/o increased frequency, urgency, nocturia 3 to 4 x, and bladder pain.  Currently she only urinates a cupful urine at a time.    Has been on flomax, oxybutynin, hydroxyzine and elavil for hx of IC, frequency, urgency and incomplete bladder emptying.  She was doing well until recently when she received Cipro from her PCP.  She also cut down Elavil because of worsening memory.    Since then, she has experienced worsening bladder pain with increased frequency, urgency and nocturia 3 to 4x.  Denies dysuria.  She had a cysto and hydro-distention in the past.  During cysto, her initial bladder capacity was 300 cc and was increased up to 550 cc after distention.    REVIEW OF SYSTEMS:  Constitutional: Negative for fever and chills.   HENT: Negative for hearing loss.   Eyes: Negative for visual disturbance.   Respiratory: Negative for shortness of breath.   Cardiovascular: Negative for chest pain.   Gastrointestinal: Positive for nausea.   Negative for  vomiting, and constipation.   Genitourinary:  Negative for urgency, frequency, difficulty urinating, nocturia, incontinence, dysuria, hematuria, intermittency, hesitancy, incomplete bladder emptying, and decreased urine volume.   Neurological: Positive for dizziness.   Hematological: Does not bruise/bleed easily.   Psychiatric/Behavioral: Negative for confusion.     PATIENT HISTORY:    Past Medical History:   Diagnosis Date    Anxiety     Dizziness     Interstitial cystitis     Migraine headache        Past Surgical History:   Procedure Laterality Date     SECTION, CLASSIC      COLONOSCOPY N/A 2016    Procedure: COLONOSCOPY;  Surgeon: Phuong Carrillo MD;  Location: [facility];  Service: Endoscopy;  Laterality: N/A;     HYSTERECTOMY      OOPHORECTOMY      TONSILLECTOMY         No family history on file.    Social History     Social History    Marital status:      Spouse name: N/A    Number of children: N/A    Years of education: N/A     Occupational History    Not on file.     Social History Main Topics    Smoking status: Never Smoker    Smokeless tobacco: Never Used    Alcohol use No    Drug use: No    Sexual activity: Yes     Partners: Male     Other Topics Concern    Not on file     Social History Narrative    No narrative on file       Allergies:  Bactrim [sulfamethoxazole-trimethoprim]; Codeine; and Zofran [ondansetron hcl (pf)]    Medications:    Current Outpatient Prescriptions:     amitriptyline (ELAVIL) 25 MG tablet, Take 1 tablet (25 mg total) by mouth nightly as needed for Insomnia., Disp: 30 tablet, Rfl: 11    butalbital-acetaminophen-caffeine -40 mg (FIORICET, ESGIC) -40 mg per tablet, Take 1-2 tablets by mouth every 6 (six) hours as needed for Headaches., Disp: 50 tablet, Rfl: 1    estradiol (ESTRACE) 1 MG tablet, Take 1 mg by mouth once daily. , Disp: , Rfl:     FLOMAX 0.4 mg Cp24, Take 1 capsule (0.4 mg total) by mouth once daily., Disp: 30 capsule, Rfl: 11    hydrOXYzine HCl (ATARAX) 25 MG tablet, Take 1 tablet (25 mg total) by mouth every evening., Disp: 30 tablet, Rfl: 11    oxybutynin (DITROPAN) 5 MG Tab, TAKE 1 TABLET BY MOUTH TWO TIMES DAILY, Disp: 60 tablet, Rfl: 12    amitriptyline (ELAVIL) 50 MG tablet, Take 1 tablet (50 mg total) by mouth every evening., Disp: 30 tablet, Rfl: 11    trospium (SANCTURA) 20 mg Tab tablet, Take 1 tablet (20 mg total) by mouth 2 (two) times daily. Take it in an empty stomach, Disp: 60 tablet, Rfl: 11    PHYSICAL EXAMINATION:    Constitutional: She is oriented to person, place, and time. She appears well-developed and well-nourished.  She is in no apparent distress.    Neck: No tracheal deviation present.     Cardiovascular: Normal rate.       Pulmonary/Chest: Effort normal. No respiratory distress.     Abdominal:  She exhibits no distension.  There is no CVA tenderness.     Lymphadenopathy:          Right: No supraclavicular adenopathy present.        Left: No supraclavicular adenopathy present.     Neurological: She is alert and oriented to person, place, and time.     Skin: Skin is warm and dry.     Extremities: No pitting edema noted in lower extremities bilaterally    Psych: Cooperative with normal affect.    Physical Exam   Constitutional: She is oriented to person, place, and time. She appears well-developed and well-nourished. No distress.   HENT:   Head: Normocephalic and atraumatic.   Right Ear: External ear normal.   Left Ear: External ear normal.   Nose: Nose normal.   Eyes: Conjunctivae and EOM are normal. Pupils are equal, round, and reactive to light. No scleral icterus.   Neck: Normal range of motion. Neck supple. No JVD present. No tracheal deviation present. No thyromegaly present.   Cardiovascular: Normal rate, regular rhythm, normal heart sounds and intact distal pulses.  Exam reveals no gallop and no friction rub.    No murmur heard.  Pulmonary/Chest: Effort normal and breath sounds normal. No respiratory distress. She has no wheezes.   Abdominal: Soft. Bowel sounds are normal. She exhibits no distension and no mass. There is no tenderness. There is no rebound and no guarding.   Genitourinary: Vagina normal and uterus normal. No vaginal discharge found.   Musculoskeletal: Normal range of motion. She exhibits no edema, tenderness or deformity.   Lymphadenopathy:     She has no cervical adenopathy.   Neurological: She is alert and oriented to person, place, and time.   Skin: Skin is warm and dry. She is not diaphoretic.     Psychiatric: She has a normal mood and affect. Her behavior is normal. Thought content normal.         LABS:    U/a: 1.010, pH 6, negative.     IMPRESSION:    Encounter Diagnoses   Name Primary?    Interstitial  cystitis Yes    Frequency-urgency syndrome     Incomplete bladder emptying     Nocturia    Elana was seen today for urinary urgency and urinary frequency.    Diagnoses and all orders for this visit:    Interstitial cystitis  -     POCT urine dipstick without microscope  -     Urine culture  -     hydrOXYzine HCl (ATARAX) 25 MG tablet; Take 1 tablet (25 mg total) by mouth every evening.  -     amitriptyline (ELAVIL) 50 MG tablet; Take 1 tablet (50 mg total) by mouth every evening.    Frequency-urgency syndrome  -     trospium (SANCTURA) 20 mg Tab tablet; Take 1 tablet (20 mg total) by mouth 2 (two) times daily. Take it in an empty stomach    Incomplete bladder emptying  -     FLOMAX 0.4 mg Cp24; Take 1 capsule (0.4 mg total) by mouth once daily.    Nocturia        PLAN:  Take Trospium instead of oxybutynin because of worsening memory.  Continue flomax, hydroxyzine, and elavil 50 mg q hs.  Will schedule her for cysto, hydrodistention, bladder instillation, botox injection ( 100 units).  Continue IC smart diet.  Consider drinking alkaline water, Evamor instead of other water.    I spent 40 minutes with the patient of which more than half was spent in direct consultation with the patient in regards to our treatment and plan.    Follow up:  Return for cysto, hydrodistention, bladder instillation, botox injection.

## 2017-08-16 NOTE — OP NOTE
[facility]  Operative Note    Date: 08/16/2017    Pre-Op Diagnosis: interstitial cystitis  Patient Active Problem List   Diagnosis    Interstitial cystitis    Incomplete bladder emptying    Right flank pain    Migraine without aura, with intractable migraine, so stated, without mention of status migrainosus    Anxiety state    Memory loss    Intractable migraine without aura and without status migrainosus    Abdominal pain, RLQ (right lower quadrant)    History of diverticulitis    Dizzy    Nocturia    Acute UTI    IC (interstitial cystitis)         Post-Op Diagnosis: same    Procedure(s) Performed:   1.  Cystoscopy with hydrodistension  2.  botox intravesical injection    Specimen(s): none    Staff Surgeon: Dr. Yong Anguiano MD    Assistant Surgeon: Carmella Parker MD    Anesthesia: General mask inhalational anesthesia    Indications: Elana Gonzales is a 42 y.o. female with interstitial cystitis presenting for hydrodistension and intravesical botox injection.    Findings:   1.  Normal bladder mucosa with no lesions.  UOs in normal anatomic position  2.  100 U of Botox injected throughout detrusor  3.  hydrodistention with 550 cc and 600 cc capacity with terminal hematuria and glomerulations    Estimated Blood Loss: min    Drains: 16 Fr samuel    Procedure in detail: After risks, benefits and possible complications of hydro distention were discussed with the patient informed consent was obtained. All questions were answered in the pre-operative area.  The patient was transferred to the cystoscopy suite and placed in the supine position.  SCDs were applied and working.  Anesthesia was administered.  After adequate anesthesia the patient was placed in the dorsal lithotomy position and prepped and draped in the usual sterile fashion. Time out was preformed and magaly-procedural antibiotics were confirmed.     A rigid cystoscope in a 22 Fr sheath was introduced into the bladder per urethra. This  passed easily.  The entire urethra was visualized which showed no masses or strictures.  The right and left ureteral orifices were identified in the normal anatomic position and were seen effluxing clear urine.  Formal cystoscopy was performed with both 30 and 70 degree lenses, which revealed no masses or lesions suspicious for malignancy, no trabeculations, no bladder stones and no bladder diverticuli. The botox needle was inserted and 100 U of botox were injected systematically throughout the detrusor.      The bladder was then filled with sterile water until equilibrium was reached at 70 cm of water. The capacity of the bladder was 550. This was repeated and the second bladder capacity was 600. There was terminal hematuria seen. There were not ulcerations seen. There wer glomerulizations seen.    The bladder was drained and the cystoscope removed.  A 16 Fr samuel catheter was placed.  A concoction of 40 000 U heparin, 20 mL 1 % lidocaine, 30 mL 0.5% bupivicaine, 10 mL of 8.4% NaHCO3 was instilled into the bladder with instructions to keep the instillation in the bladder for at least 30 minutes postoperatively.      The patient was removed from lithotomy and transferred to recovery in stable condition.    Disposition:  The patient will follow up with Dr. Anguiano in 12 weeks.  Prescriptions were given for percocet and macrobid.      Carmella Parker MD

## 2017-08-16 NOTE — TRANSFER OF CARE
"Anesthesia Transfer of Care Note    Patient: Elana Gonzales    Procedure(s) Performed: Procedure(s) (LRB):  CYSTOSCOPY WITH HYDRODISTENSION (N/A)  INJECTION-BOTOX 100 UNITS (N/A)  INSTILLATION-BLADDER (N/A)    Patient location: PACU    Anesthesia Type: general    Transport from OR: Transported from OR on room air with adequate spontaneous ventilation    Post pain: adequate analgesia    Post assessment: no apparent anesthetic complications    Post vital signs: stable    Level of consciousness: awake    Nausea/Vomiting: no nausea/vomiting    Complications: none    Transfer of care protocol was followed      Last vitals:   Visit Vitals  /82 (BP Location: Left arm, Patient Position: Lying)   Pulse (!) 114   Temp 36.6 °C (97.9 °F) (Temporal)   Resp 20   Ht 5' 3" (1.6 m)   Wt 88.5 kg (195 lb)   LMP 08/26/2008   SpO2 96%   Breastfeeding? No   BMI 34.54 kg/m²     "

## 2017-08-17 VITALS
TEMPERATURE: 98 F | HEIGHT: 63 IN | BODY MASS INDEX: 34.55 KG/M2 | RESPIRATION RATE: 18 BRPM | DIASTOLIC BLOOD PRESSURE: 80 MMHG | HEART RATE: 85 BPM | WEIGHT: 195 LBS | SYSTOLIC BLOOD PRESSURE: 110 MMHG | OXYGEN SATURATION: 99 %

## 2017-08-17 LAB — BACTERIA UR CULT: NO GROWTH

## 2017-08-17 NOTE — ANESTHESIA POSTPROCEDURE EVALUATION
"Anesthesia Post Evaluation    Patient: Elana Gonzales    Procedure(s) Performed: Procedure(s) (LRB):  CYSTOSCOPY WITH HYDRODISTENSION (N/A)  INJECTION-BOTOX 100 UNITS (N/A)  INSTILLATION-BLADDER (N/A)    Final Anesthesia Type: general  Patient location during evaluation: PACU  Patient participation: Yes- Able to Participate  Level of consciousness: awake and alert and awake  Post-procedure vital signs: reviewed and stable  Pain management: adequate  Airway patency: patent  PONV status at discharge: No PONV  Anesthetic complications: no      Cardiovascular status: blood pressure returned to baseline  Respiratory status: unassisted and spontaneous ventilation  Hydration status: euvolemic  Follow-up not needed.        Visit Vitals  /80   Pulse 85   Temp 36.7 °C (98 °F) (Temporal)   Resp 18   Ht 5' 3" (1.6 m)   Wt 88.5 kg (195 lb)   LMP 08/26/2008   SpO2 99%   Breastfeeding? No   BMI 34.54 kg/m²       Pain/Reyes Score: Pain Assessment Performed: Yes (8/16/2017 11:39 AM)  Presence of Pain: complains of pain/discomfort (8/16/2017 11:39 AM)  Pain Rating Prior to Med Admin: 7 (8/16/2017 11:39 AM)  Pain Rating Post Med Admin: 3 (8/16/2017 11:39 AM)  Reyes Score: 9 (8/16/2017 10:35 AM)      "

## 2017-08-30 ENCOUNTER — TELEPHONE (OUTPATIENT)
Dept: UROLOGY | Facility: CLINIC | Age: 42
End: 2017-08-30

## 2017-08-30 DIAGNOSIS — N30.10 IC (INTERSTITIAL CYSTITIS): Primary | ICD-10-CM

## 2017-08-30 NOTE — TELEPHONE ENCOUNTER
----- Message from Vianey Jensen MA sent at 8/30/2017  9:33 AM CDT -----  Contact: [phone]  Did not wish to go in to detail with me about the nature of her call.

## 2017-08-30 NOTE — TELEPHONE ENCOUNTER
Diagnoses and all orders for this visit:    IC (interstitial cystitis)  -     Discontinue: pentosan polysulfate (ELMIRON) 100 mg Cap; Take 2 capsules (200 mg total) by mouth 2 (two) times daily before meals.  -     Discontinue: methen-m.blue-s.phos-phsal-hyo (URELLE) 81-10.8-40.8 mg Tab; Take 1 tablet by mouth 3 (three) times daily as needed.  -     pentosan polysulfate (ELMIRON) 100 mg Cap; Take 2 capsules (200 mg total) by mouth 2 (two) times daily before meals.  -     methen-m.blue-s.phos-phsal-hyo (URELLE) 81-10.8-40.8 mg Tab; Take 1 tablet by mouth 3 (three) times daily as needed.    Other orders  -     Discontinue: methen-m.blue-s.phos-phsal-hyo (URELLE) 81-10.8-40.8 mg Tab; Take 1 tablet by mouth 3 (three) times daily as needed.  -     Discontinue: pentosan polysulfate (ELMIRON) 100 mg Cap; Take 2 capsules (200 mg total) by mouth 2 (two) times daily before meals.

## 2017-09-14 DIAGNOSIS — N30.10 IC (INTERSTITIAL CYSTITIS): ICD-10-CM

## 2017-09-14 NOTE — TELEPHONE ENCOUNTER
----- Message from Irish Johnson sent at 9/14/2017  4:17 PM CDT -----  Contact: Pt:[phone]  Pt .                              Prescription Refill Request  Name of medication and dose :methen-m.blue-s.phos-phsal-hyo (URELLE) 81-10.8-40.8 mg Tab  Pharmacy :[place] [address]  [phone] (Phone)  [phone] (Fax)        Are you completely out of your medication Yes    Additional Information:Pt states the medication is not covered by her insurance pt would like to know if something else could be called in.

## 2017-09-21 ENCOUNTER — TELEPHONE (OUTPATIENT)
Dept: UROLOGY | Facility: CLINIC | Age: 42
End: 2017-09-21

## 2017-09-21 DIAGNOSIS — R39.89 BLADDER PAIN: Primary | ICD-10-CM

## 2017-09-21 RX ORDER — PHENAZOPYRIDINE HYDROCHLORIDE 200 MG/1
200 TABLET, FILM COATED ORAL 3 TIMES DAILY PRN
Qty: 90 TABLET | Refills: 2 | Status: SHIPPED | OUTPATIENT
Start: 2017-09-21 | End: 2017-10-21

## 2017-09-21 NOTE — TELEPHONE ENCOUNTER
urelle is not covered per insurance. She is requesting pyridium to be sent to [facility], thanks

## 2017-09-21 NOTE — TELEPHONE ENCOUNTER
----- Message from Giselle Ruiz sent at 9/21/2017 10:07 AM CDT -----  Contact: pt#[phone]  She states that she wants to speak with you about her medication. PLease call

## 2017-09-21 NOTE — TELEPHONE ENCOUNTER
Diagnoses and all orders for this visit:    Bladder pain  -     phenazopyridine (PYRIDIUM) 200 MG tablet; Take 1 tablet (200 mg total) by mouth 3 (three) times daily as needed for Pain.

## 2017-10-20 ENCOUNTER — TELEPHONE (OUTPATIENT)
Dept: UROLOGY | Facility: CLINIC | Age: 42
End: 2017-10-20

## 2017-10-20 NOTE — TELEPHONE ENCOUNTER
----- Message from Vianey Jensen MA sent at 10/20/2017  1:03 PM CDT -----  Contact: Home: [phone]   1st available is December, she wants to be seen month. I already tried to book her.   ----- Message -----  From: Sabina Mills LPN  Sent: 10/20/2017   1:01 PM  To: Vianey Jensen MA    She is on the recall for late oct-please schedule next available ,thanks   ----- Message -----  From: Vianey Jensen MA  Sent: 10/20/2017  11:38 AM  To: Silvio ROMEO Staff    Pt had a procedure in Aug and states she was told she would be getting an appt by mail to F/U/  this month with Dr Anguiano. No appt has been made as of yet. Home: [phone]

## 2017-11-27 ENCOUNTER — TELEPHONE (OUTPATIENT)
Dept: UROLOGY | Facility: CLINIC | Age: 42
End: 2017-11-27

## 2017-11-27 NOTE — TELEPHONE ENCOUNTER
----- Message from Kacy Dominguez MA sent at 11/27/2017  1:43 PM CST -----  Contact: self  [phone]  States she wants to see Dr. Anguiano since after her surgery.  I have nothing to offer her.  She states you can e-mail it to her.

## 2017-12-01 ENCOUNTER — TELEPHONE (OUTPATIENT)
Dept: UROLOGY | Facility: CLINIC | Age: 42
End: 2017-12-01

## 2017-12-01 NOTE — TELEPHONE ENCOUNTER
----- Message from Catia Mata sent at 12/1/2017 12:09 PM CST -----  Contact: pt [phone]  Pt would like to speak with the nurse in reference too a PA for trospium (SANCTURA) 20 mg Tab tablet . Please call pt

## 2017-12-07 ENCOUNTER — HOSPITAL ENCOUNTER (EMERGENCY)
Facility: HOSPITAL | Age: 42
Discharge: HOME | End: 2017-12-07
Attending: EMERGENCY MEDICINE
Payer: COMMERCIAL

## 2017-12-07 VITALS
DIASTOLIC BLOOD PRESSURE: 77 MMHG | HEIGHT: 63 IN | SYSTOLIC BLOOD PRESSURE: 118 MMHG | HEART RATE: 104 BPM | RESPIRATION RATE: 16 BRPM | OXYGEN SATURATION: 97 % | BODY MASS INDEX: 34.55 KG/M2 | WEIGHT: 195 LBS | TEMPERATURE: 97 F

## 2017-12-07 DIAGNOSIS — M79.89 LEFT LEG SWELLING: ICD-10-CM

## 2017-12-07 DIAGNOSIS — S90.32XA CONTUSION OF LEFT FOOT, INITIAL ENCOUNTER: Primary | ICD-10-CM

## 2017-12-07 DIAGNOSIS — S99.922A FOOT INJURY, LEFT, INITIAL ENCOUNTER: ICD-10-CM

## 2017-12-07 PROCEDURE — 25000003 PHARM REV CODE 250: Performed by: EMERGENCY MEDICINE

## 2017-12-07 PROCEDURE — 99284 EMERGENCY DEPT VISIT MOD MDM: CPT

## 2017-12-07 RX ORDER — ETODOLAC 300 MG/1
300 CAPSULE ORAL EVERY 8 HOURS PRN
Qty: 30 CAPSULE | Refills: 0 | Status: SHIPPED | OUTPATIENT
Start: 2017-12-07 | End: 2018-04-13

## 2017-12-07 RX ORDER — KETOROLAC TROMETHAMINE 10 MG/1
10 TABLET, FILM COATED ORAL
Status: COMPLETED | OUTPATIENT
Start: 2017-12-07 | End: 2017-12-07

## 2017-12-07 RX ADMIN — KETOROLAC TROMETHAMINE 10 MG: 10 TABLET, FILM COATED ORAL at 07:12

## 2017-12-08 NOTE — ED TRIAGE NOTES
Pt. To the ER with c/o left foot pain after having a metal cart fall on it while at work last Friday. Pt. Saw her PMD and had an x ray. She was told to follow up with an MRI. The pt. States her swelling and pain has increased since her injury.

## 2017-12-08 NOTE — ED PROVIDER NOTES
Encounter Date: 2017    SCRIBE #1 NOTE: I, Dwight Clarke, am scribing for, and in the presence of,  Dr. Michael Corley. I have scribed the entire note.   SCRIBE #2 NOTE: I, Brenda Tom, am scribing for, and in the presence of, Dr. Corley.     History     Chief Complaint   Patient presents with    Foot Injury     heavy cart fell on top of left foot on Friday.  Tenderness and swelling has increased.     This is a 42 y.o. female who has a past medical history of Anxiety; Dizziness; Interstitial cystitis; and Migraine headache.   The patient presents to the Emergency Department with pain to the left foot after a cart dropped on it 6 days ago.  She now complains of worsening pain and swelling to the foot and thinks the swelling is moving up her leg.   She has no history of swelling to the lower extremities or prior injury to the area.   Pt denies chest pain and shortness of breath.  The patient is able to ambulate.   Pt has a past surgical history that includes Hysterectomy;  section, classic; Oophorectomy; Colonoscopy (N/A, 2016); and Tonsillectomy.        The history is provided by the patient.     Review of patient's allergies indicates:   Allergen Reactions    Bactrim [sulfamethoxazole-trimethoprim] Other (See Comments)     headache    Codeine Other (See Comments)     Other reaction(s): Vomiting     Past Medical History:   Diagnosis Date    Anxiety     Dizziness     Interstitial cystitis     Migraine headache      Past Surgical History:   Procedure Laterality Date     SECTION, CLASSIC      COLONOSCOPY N/A 2016    Procedure: COLONOSCOPY;  Surgeon: Phuong Carrillo MD;  Location: [facility];  Service: Endoscopy;  Laterality: N/A;    HYSTERECTOMY      OOPHORECTOMY      TONSILLECTOMY       History reviewed. No pertinent family history.  Social History   Substance Use Topics    Smoking status: Never Smoker    Smokeless tobacco: Never Used    Alcohol use No     Review of  Systems   Constitutional: Negative for fever.   HENT: Negative for facial swelling.    Eyes: Negative for visual disturbance.   Respiratory: Negative for shortness of breath.    Cardiovascular: Negative for chest pain.   Gastrointestinal: Negative for nausea.   Genitourinary: Negative for dysuria.   Musculoskeletal: Positive for arthralgias, joint swelling and myalgias. Negative for back pain.        Pain in L foot, ankle, and lower calf   Skin: Negative for rash and wound.   Neurological: Negative for speech difficulty.       Physical Exam     Initial Vitals [12/07/17 1840]   BP Pulse Resp Temp SpO2   (!) 156/86 108 15 98 °F (36.7 °C) 99 %      MAP       109.33         Physical Exam    Nursing note and vitals reviewed.  Constitutional: She appears well-developed and well-nourished. She is not diaphoretic. No distress.   HENT:   Head: Normocephalic and atraumatic.   Eyes: EOM are normal. Pupils are equal, round, and reactive to light.   Neck: Normal range of motion. Neck supple.   Cardiovascular: Normal rate, regular rhythm, normal heart sounds and intact distal pulses.   Pulmonary/Chest: Breath sounds normal. No respiratory distress. She has no decreased breath sounds. She has no wheezes. She has no rhonchi. She has no rales.   Musculoskeletal: Normal range of motion. She exhibits edema and tenderness.   Swelling to the mid tibia: 1+ leg, 2+ foot    Tenderness and swelling to:  Lateral maleolis  Across metatarsles  Midfoot  Base to fifth metatarsal   Neurological: She is alert and oriented to person, place, and time.   Skin: Skin is warm and dry.   No ecchymosis    Psychiatric: She has a normal mood and affect.         ED Course   Procedures  Labs Reviewed - No data to display       X-Rays:   Independently Interpreted Readings:   Other Readings:  Foot/ankle L X-ray  Negative for Fx or dislocation      Medical Decision Making:   History:   Old Medical Records: I decided to obtain old medical records.  Old Records  Summarized: records from clinic visits.       <> Summary of Records: Admission 8/16/17 by Dr. Anguiano for intersicial cystitis. Last ED visit 5/2/17 for left elbow injury.    Initial Assessment:   This is an emergent evaluation of a 42 y.o.female patient with presentation of L foot injury, pain and swelling.  Initial differentials include but are not limited to: contusion, fracture, strain/sprain, DVT.  Plan: Toradol PO, ACE wrap, ice, X-rays L foot/ankle, LLE DVT study.     Differential Diagnosis:         Independently Interpreted Test(s):   I have ordered and independently interpreted X-rays - see prior notes.  Clinical Tests:   Radiological Study: Ordered and Reviewed  ED Management:  X-rays and US unremarkable (US rules out DVT). I suspect contusion to left foot and recommend RICE therapy to the patient for home symptom management. Will prescribe etodolac.     Clinical impression and plan discussed with patient.    Pt is to call for follow up with PCP in 7 days.  Pt to return to the ED for any new or concerning symptoms.  Aftercare instructions and return precautions provided to patient.   Pt expressed understanding and agrees with plan.                    ED Course      Clinical Impression:     1. Contusion of left foot, initial encounter    2. Foot injury, left, initial encounter    3. Left leg swelling          Disposition:   Disposition: Discharged  Condition: Stable         I, Dr. Michael Corley, personally performed the services described in this documentation.   All medical record entries made by the scribe were at my direction and in my presence.   I have reviewed the chart and agree that the record is accurate and complete.   Michael Corley MD.  9:57 PM 12/07/2017                  Michael Corley MD  12/07/17 3619

## 2017-12-29 ENCOUNTER — TELEPHONE (OUTPATIENT)
Dept: UROLOGY | Facility: CLINIC | Age: 42
End: 2017-12-29

## 2017-12-29 NOTE — TELEPHONE ENCOUNTER
Spoke with patient states she had surgery in August, did not keep post op visit, scheduled follow up reminder letter mailed.

## 2018-01-16 ENCOUNTER — TELEPHONE (OUTPATIENT)
Dept: UROLOGY | Facility: CLINIC | Age: 43
End: 2018-01-16

## 2018-01-16 NOTE — TELEPHONE ENCOUNTER
Received fax from [employer] that flomax needs PA. We have done this several times as she requests name brand only. Cover my meds form completed. REF#[id number]

## 2018-01-23 ENCOUNTER — OFFICE VISIT (OUTPATIENT)
Dept: UROLOGY | Facility: CLINIC | Age: 43
End: 2018-01-23
Payer: MEDICAID

## 2018-01-23 VITALS
HEIGHT: 63 IN | BODY MASS INDEX: 35.44 KG/M2 | DIASTOLIC BLOOD PRESSURE: 83 MMHG | HEART RATE: 100 BPM | SYSTOLIC BLOOD PRESSURE: 113 MMHG | WEIGHT: 200 LBS

## 2018-01-23 DIAGNOSIS — N31.8 FREQUENCY-URGENCY SYNDROME: ICD-10-CM

## 2018-01-23 DIAGNOSIS — N30.10 INTERSTITIAL CYSTITIS: ICD-10-CM

## 2018-01-23 DIAGNOSIS — R33.9 INCOMPLETE BLADDER EMPTYING: ICD-10-CM

## 2018-01-23 DIAGNOSIS — F41.1 ANXIETY STATE: Primary | ICD-10-CM

## 2018-01-23 PROCEDURE — 99213 OFFICE O/P EST LOW 20 MIN: CPT | Mod: PBBFAC | Performed by: UROLOGY

## 2018-01-23 PROCEDURE — 99999 PR PBB SHADOW E&M-EST. PATIENT-LVL III: CPT | Mod: PBBFAC,,, | Performed by: UROLOGY

## 2018-01-23 PROCEDURE — 99214 OFFICE O/P EST MOD 30 MIN: CPT | Mod: S$PBB,,, | Performed by: UROLOGY

## 2018-01-23 RX ORDER — TAMSULOSIN HYDROCHLORIDE 0.4 MG/1
1 CAPSULE ORAL DAILY
Qty: 30 CAPSULE | Refills: 11 | Status: SHIPPED | OUTPATIENT
Start: 2018-01-23 | End: 2018-12-27 | Stop reason: SDUPTHER

## 2018-01-23 RX ORDER — AMITRIPTYLINE HYDROCHLORIDE 50 MG/1
50 TABLET, FILM COATED ORAL NIGHTLY
Qty: 30 TABLET | Refills: 11 | Status: SHIPPED | OUTPATIENT
Start: 2018-01-23 | End: 2018-01-25 | Stop reason: SDUPTHER

## 2018-01-23 RX ORDER — ALPRAZOLAM 2 MG/1
2 TABLET ORAL NIGHTLY PRN
Qty: 30 TABLET | Refills: 0 | Status: SHIPPED | OUTPATIENT
Start: 2018-01-23 | End: 2018-04-13

## 2018-01-23 RX ORDER — ALPRAZOLAM 2 MG/1
2 TABLET ORAL DAILY
Refills: 0 | COMMUNITY
Start: 2017-12-01 | End: 2018-01-23 | Stop reason: SDUPTHER

## 2018-01-23 RX ORDER — HYDROXYZINE HYDROCHLORIDE 25 MG/1
25 TABLET, FILM COATED ORAL NIGHTLY
Qty: 30 TABLET | Refills: 11 | Status: SHIPPED | OUTPATIENT
Start: 2018-01-23 | End: 2018-01-25 | Stop reason: SDUPTHER

## 2018-01-23 RX ORDER — HYDROCODONE BITARTRATE AND ACETAMINOPHEN 10; 325 MG/1; MG/1
TABLET ORAL
Refills: 0 | COMMUNITY
Start: 2017-12-07 | End: 2018-04-13

## 2018-01-23 RX ORDER — TROSPIUM CHLORIDE 20 MG/1
20 TABLET, FILM COATED ORAL 2 TIMES DAILY
Qty: 60 TABLET | Refills: 11 | Status: SHIPPED | OUTPATIENT
Start: 2018-01-23 | End: 2018-01-25 | Stop reason: SDUPTHER

## 2018-01-23 NOTE — PROGRESS NOTES
CC: s/p cysto hydrodistention and botox injection on 8/16/17    CHIEF COMPLAINT:     Mrs. Gonzales is a 42 y.o. female presenting for increased frequency, urgency, nocturia, and bladder pain.  PRESENTING ILLNESS:     Elana Gonzales is a 42 y.o. female with a PMH of IC who presents for annual exam.     s/p cysto hydrodistention and botox injection on 8/16/17    Findings:   1.  Normal bladder mucosa with no lesions.  UOs in normal anatomic position  2.  100 U of Botox injected throughout detrusor  3.  hydrodistention with 550 cc and 600 cc capacity with terminal hematuria and glomerulations    C/o increased frequency, urgency, nocturia 3 to 4 x, and bladder pain.  Currently she only urinates a cupful urine at a time.     Has been on flomax, sanctura, hydroxyzine and elavil for hx of IC, frequency, urgency and incomplete bladder emptying.  She was doing well until recently when she received Cipro from her PCP.  Unable to take Elmiron because it makes her more difficult to urinate.     Since then, she has experienced worsening bladder pain with increased frequency, urgency and nocturia 3 to 4x.  Denies dysuria.  She had a cysto and hydro-distention in the past.  During cysto, her initial bladder capacity was 300 cc and was increased up to 550 cc after distention.     REVIEW OF SYSTEMS:  Constitutional: Negative for fever and chills.   HENT: Negative for hearing loss.   Eyes: Negative for visual disturbance.   Respiratory: Negative for shortness of breath.   Cardiovascular: Negative for chest pain.   Gastrointestinal: Positive for nausea.   Negative for  vomiting, and constipation.   Genitourinary:  Negative for urgency, frequency, difficulty urinating, nocturia, incontinence, dysuria, hematuria, intermittency, hesitancy, incomplete bladder emptying, and decreased urine volume.   Neurological: Positive for dizziness.   Hematological: Does not bruise/bleed easily.   Psychiatric/Behavioral: Negative for confusion.       PATIENT HISTORY:          Past Medical History:   Diagnosis Date    Anxiety      Dizziness      Interstitial cystitis      Migraine headache                 Past Surgical History:   Procedure Laterality Date     SECTION, CLASSIC        COLONOSCOPY N/A 2016     Procedure: COLONOSCOPY;  Surgeon: Phuong Carrillo MD;  Location: [facility];  Service: Endoscopy;  Laterality: N/A;    HYSTERECTOMY        OOPHORECTOMY        TONSILLECTOMY             No family history on file.     Social History   Social History            Social History    Marital status:        Spouse name: N/A    Number of children: N/A    Years of education: N/A          Occupational History    Not on file.            Social History Main Topics    Smoking status: Never Smoker    Smokeless tobacco: Never Used    Alcohol use No    Drug use: No    Sexual activity: Yes       Partners: Male           Other Topics Concern    Not on file          Social History Narrative    No narrative on file            Allergies:  Bactrim [sulfamethoxazole-trimethoprim]; Codeine; and Zofran [ondansetron hcl (pf)]     Medications:     Current Outpatient Prescriptions:     amitriptyline (ELAVIL) 25 MG tablet, Take 1 tablet (25 mg total) by mouth nightly as needed for Insomnia., Disp: 30 tablet, Rfl: 11    butalbital-acetaminophen-caffeine -40 mg (FIORICET, ESGIC) -40 mg per tablet, Take 1-2 tablets by mouth every 6 (six) hours as needed for Headaches., Disp: 50 tablet, Rfl: 1    estradiol (ESTRACE) 1 MG tablet, Take 1 mg by mouth once daily. , Disp: , Rfl:     FLOMAX 0.4 mg Cp24, Take 1 capsule (0.4 mg total) by mouth once daily., Disp: 30 capsule, Rfl: 11    hydrOXYzine HCl (ATARAX) 25 MG tablet, Take 1 tablet (25 mg total) by mouth every evening., Disp: 30 tablet, Rfl: 11    oxybutynin (DITROPAN) 5 MG Tab, TAKE 1 TABLET BY MOUTH TWO TIMES DAILY, Disp: 60 tablet, Rfl: 12    amitriptyline (ELAVIL) 50 MG  tablet, Take 1 tablet (50 mg total) by mouth every evening., Disp: 30 tablet, Rfl: 11    trospium (SANCTURA) 20 mg Tab tablet, Take 1 tablet (20 mg total) by mouth 2 (two) times daily. Take it in an empty stomach, Disp: 60 tablet, Rfl: 11     PHYSICAL EXAMINATION:     Constitutional: She is oriented to person, place, and time. She appears well-developed and well-nourished.  She is in no apparent distress.  Neck: No tracheal deviation present.      Cardiovascular: Normal rate.     Pulmonary/Chest: Effort normal. No respiratory distress.      Abdominal:  She exhibits no distension.  There is no CVA tenderness.      Lymphadenopathy:          Right: No supraclavicular adenopathy present.        Left: No supraclavicular adenopathy present.    Neurological: She is alert and oriented to person, place, and time.      Skin: Skin is warm and dry.      Extremities: No pitting edema noted in lower extremities bilaterally  Psych: Cooperative with normal affect.     Physical Exam   Constitutional: She is oriented to person, place, and time. She appears well-developed and well-nourished. No distress.   HENT:   Head: Normocephalic and atraumatic.   Right Ear: External ear normal.   Left Ear: External ear normal.   Nose: Nose normal.   Eyes: Conjunctivae and EOM are normal. Pupils are equal, round, and reactive to light. No scleral icterus.   Neck: Normal range of motion. Neck supple. No JVD present. No tracheal deviation present. No thyromegaly present.   Cardiovascular: Normal rate, regular rhythm, normal heart sounds and intact distal pulses.  Exam reveals no gallop and no friction rub.    No murmur heard.  Pulmonary/Chest: Effort normal and breath sounds normal. No respiratory distress. She has no wheezes.   Abdominal: Soft. Bowel sounds are normal. She exhibits no distension and no mass. There is no tenderness. There is no rebound and no guarding.   Genitourinary: Vagina normal and uterus normal. No vaginal discharge found.    Musculoskeletal: Normal range of motion. She exhibits no edema, tenderness or deformity.   Lymphadenopathy:     She has no cervical adenopathy.   Neurological: She is alert and oriented to person, place, and time.   Skin: Skin is warm and dry. She is not diaphoretic.     Psychiatric: She has a normal mood and affect. Her behavior is normal. Thought content normal.            LABS:     U/a: 1.010, pH 6, negative.      IMPRESSION:     Encounter Diagnoses   Name Primary?    Interstitial cystitis Yes    Frequency-urgency syndrome      Incomplete bladder emptying      Nocturia     Elana was seen today for urinary urgency and urinary frequency.     Diagnoses and all orders for this visit:     Interstitial cystitis  -     POCT urine dipstick without microscope  -     Urine culture  -     hydrOXYzine HCl (ATARAX) 25 MG tablet; Take 1 tablet (25 mg total) by mouth every evening.  -     amitriptyline (ELAVIL) 50 MG tablet; Take 1 tablet (50 mg total) by mouth every evening.     Frequency-urgency syndrome  -     trospium (SANCTURA) 20 mg Tab tablet; Take 1 tablet (20 mg total) by mouth 2 (two) times daily. Take it in an empty stomach     Incomplete bladder emptying  -     FLOMAX 0.4 mg Cp24; Take 1 capsule (0.4 mg total) by mouth once daily.     Nocturia           PLAN:  Take Trospium instead of oxybutynin because of worsening memory.  Continue flomax, hydroxyzine, and elavil 50 mg q hs.  May need to repeat her for cysto, hydrodistention, bladder instillation, botox injection ( 100 units).  Continue IC smart diet.  Consider drinking alkaline water, Evamor instead of other water.  Baking soda water prn recommended.    I spent 25 minutes with the patient of which more than half was spent in direct consultation with the patient in regards to our treatment and plan.     Follow up:  Follow-up in about 1 year (around 1/23/2019).

## 2018-01-25 DIAGNOSIS — N31.8 FREQUENCY-URGENCY SYNDROME: ICD-10-CM

## 2018-01-25 DIAGNOSIS — N30.10 INTERSTITIAL CYSTITIS: ICD-10-CM

## 2018-01-25 RX ORDER — TROSPIUM CHLORIDE 20 MG/1
20 TABLET, FILM COATED ORAL 2 TIMES DAILY
Qty: 60 TABLET | Refills: 11 | Status: SHIPPED | OUTPATIENT
Start: 2018-01-25 | End: 2018-03-15

## 2018-01-25 RX ORDER — HYDROXYZINE HYDROCHLORIDE 25 MG/1
25 TABLET, FILM COATED ORAL NIGHTLY
Qty: 30 TABLET | Refills: 11 | Status: SHIPPED | OUTPATIENT
Start: 2018-01-25 | End: 2018-04-11 | Stop reason: SDUPTHER

## 2018-01-25 RX ORDER — AMITRIPTYLINE HYDROCHLORIDE 50 MG/1
50 TABLET, FILM COATED ORAL NIGHTLY
Qty: 30 TABLET | Refills: 11 | Status: SHIPPED | OUTPATIENT
Start: 2018-01-25 | End: 2018-04-13

## 2018-01-25 NOTE — TELEPHONE ENCOUNTER
----- Message from Shannan Frank LPN sent at 1/24/2018  1:43 PM CST -----  Contact: self - [phone]      ----- Message -----  From: Jordana Aldrich  Sent: 1/24/2018  12:51 PM  To: Silvio ROMEO Staff    Silvio - [facility] states her meds were cancelled - please call patient at

## 2018-01-26 ENCOUNTER — TELEPHONE (OUTPATIENT)
Dept: UROLOGY | Facility: CLINIC | Age: 43
End: 2018-01-26

## 2018-01-26 NOTE — TELEPHONE ENCOUNTER
I called [facility] --her flomax was approved and ready for pick-up. However, a new rx was electronically sent to [facility] yesterday. When it was sent to [facility] it automatically cancelled the original rx to [facility]. I called [facility] and asked them to fill the rx.

## 2018-01-26 NOTE — TELEPHONE ENCOUNTER
----- Message from Irish Johnson sent at 1/26/2018  2:25 PM CST -----  Contact: Pt:[phone]  .                              Prescription Refill Request  Name of medication and dose:FLOMAX 0.4 mg Cp24    [facility] [place] [address] [phone] (Phone)  [phone] (Fax)    Are you completely out of your medication Yes    Additional Information:Pt called and states she would like to speak with the nurse to find out why was her Rx canceled.

## 2018-03-15 ENCOUNTER — TELEPHONE (OUTPATIENT)
Dept: UROLOGY | Facility: CLINIC | Age: 43
End: 2018-03-15

## 2018-03-15 DIAGNOSIS — N32.81 OAB (OVERACTIVE BLADDER): Primary | ICD-10-CM

## 2018-03-15 RX ORDER — OXYBUTYNIN CHLORIDE 10 MG/1
10 TABLET, EXTENDED RELEASE ORAL NIGHTLY
Qty: 30 TABLET | Refills: 11 | Status: SHIPPED | OUTPATIENT
Start: 2018-03-15 | End: 2019-03-12 | Stop reason: SDUPTHER

## 2018-03-15 NOTE — TELEPHONE ENCOUNTER
Patient states she does not like the trosopium. She would like to go back to the oxybutynin. She was on 5mg twice a day , but states she only used it at night. She would like 5mg or 10mg at night. Please send to [facility].

## 2018-03-15 NOTE — TELEPHONE ENCOUNTER
Diagnoses and all orders for this visit:    OAB (overactive bladder)  -     oxybutynin (DITROPAN-XL) 10 MG 24 hr tablet; Take 1 tablet (10 mg total) by mouth every evening.

## 2018-03-15 NOTE — TELEPHONE ENCOUNTER
----- Message from Vianey Jensen MA sent at 3/15/2018  9:50 AM CDT -----  Contact: [phone]  Medication issues. Pt would not go in to detail     [phone]

## 2018-04-02 ENCOUNTER — TELEPHONE (OUTPATIENT)
Dept: UROLOGY | Facility: CLINIC | Age: 43
End: 2018-04-02

## 2018-04-02 DIAGNOSIS — N39.0 ACUTE UTI: Primary | ICD-10-CM

## 2018-04-02 RX ORDER — CIPROFLOXACIN 500 MG/1
500 TABLET ORAL 2 TIMES DAILY
Qty: 14 TABLET | Refills: 0 | Status: SHIPPED | OUTPATIENT
Start: 2018-04-02 | End: 2018-04-09

## 2018-04-02 NOTE — TELEPHONE ENCOUNTER
----- Message from Sabina Mills LPN sent at 4/2/2018 12:59 PM CDT -----  Contact: pt [phone]  Patient went to [facility] . Was given bactrim , she is allergic and request another rx. I did ask her to get the culture results faxed to us  ----- Message -----  From: Azul Gray  Sent: 4/2/2018  11:06 AM  To: Silvio ROMEO Staff    Pt called requesting for a call back from Dr Anguiano or his nurse.  Pt stated she went to the hospital yesterday ([facility]) and she reported they gave her Bactrim (which she is allergic to)  Pt deferred speaking to an on call nurse, was asked if she was feeling ok, or was she having any symptoms.  She repeated that she just wanted for some one in Dr Anguiano's office to call her, she stated she needs some different antibiotic called in.    Pt can be reached at [phone]  Requested high priority  Thanks  napoleon

## 2018-04-02 NOTE — TELEPHONE ENCOUNTER
Diagnoses and all orders for this visit:    Acute UTI  -     ciprofloxacin HCl (CIPRO) 500 MG tablet; Take 1 tablet (500 mg total) by mouth 2 (two) times daily.    she can take cipro.  Please have her check with the ER for urine culture and see whether or not cipro is an adequate abx for her UTI.  If there is no bacterial growth noted on her urine culture, we have to look into possible IC flare up.

## 2018-04-09 ENCOUNTER — TELEPHONE (OUTPATIENT)
Dept: UROLOGY | Facility: CLINIC | Age: 43
End: 2018-04-09

## 2018-04-09 DIAGNOSIS — N39.0 ACUTE UTI: Primary | ICD-10-CM

## 2018-04-09 DIAGNOSIS — N30.10 IC (INTERSTITIAL CYSTITIS): ICD-10-CM

## 2018-04-09 NOTE — TELEPHONE ENCOUNTER
Diagnoses and all orders for this visit:    Acute UTI  -     Urine culture; Future    IC (interstitial cystitis)  -     Urine culture; Future

## 2018-04-09 NOTE — TELEPHONE ENCOUNTER
----- Message from Sabina Mills LPN sent at 4/9/2018  1:04 PM CDT -----  Contact: Home: [phone]   Went to [facility] for uti and was treated, can she do a home collect after finishing antibjioitc?  ----- Message -----  From: Vianey Jensen MA  Sent: 4/9/2018  11:07 AM  To: Silvio ROMEO Staff    Wanting to talk to you about about getting set up to do a urine culture.  Home: [phone]

## 2018-04-11 ENCOUNTER — TELEPHONE (OUTPATIENT)
Dept: UROLOGY | Facility: CLINIC | Age: 43
End: 2018-04-11

## 2018-04-11 DIAGNOSIS — N30.10 IC (INTERSTITIAL CYSTITIS): Primary | ICD-10-CM

## 2018-04-11 DIAGNOSIS — N30.10 INTERSTITIAL CYSTITIS: ICD-10-CM

## 2018-04-11 RX ORDER — HYDROXYZINE HYDROCHLORIDE 25 MG/1
25 TABLET, FILM COATED ORAL NIGHTLY
Qty: 30 TABLET | Refills: 11 | Status: SHIPPED | OUTPATIENT
Start: 2018-04-11 | End: 2019-04-11 | Stop reason: SDUPTHER

## 2018-04-11 NOTE — TELEPHONE ENCOUNTER
Diagnoses and all orders for this visit:    IC (interstitial cystitis)    Interstitial cystitis  -     hydrOXYzine HCl (ATARAX) 25 MG tablet; Take 1 tablet (25 mg total) by mouth every evening.    I talked to her.  She informed me that she had another black out episodes with not remembering about the incidence.  Thus she stopped both elavil and hydroxyzine.  However, now she can sleep at night.    So I recommend to try hydroxyzine 25 mg q hs. And see how she tolerates it.

## 2018-04-11 NOTE — TELEPHONE ENCOUNTER
----- Message from Shannan Frank LPN sent at 4/11/2018 11:02 AM CDT -----  Contact: pt [phone]   Pt informed that urine culture is still pending. Pt is concerned that she has not slept in 4 days since she stopped medication (elavil and atarax)   ----- Message -----  From: Catia Mata  Sent: 4/11/2018   9:24 AM  To: Silvio ROMEO Staff    Pt states she is calling for results. Pt also states she has not slept in a couple of days since discontinuing 2 of her medications and would like some advice.

## 2018-04-12 ENCOUNTER — TELEPHONE (OUTPATIENT)
Dept: UROLOGY | Facility: CLINIC | Age: 43
End: 2018-04-12

## 2018-04-13 ENCOUNTER — OFFICE VISIT (OUTPATIENT)
Dept: UROLOGY | Facility: CLINIC | Age: 43
End: 2018-04-13
Payer: MEDICAID

## 2018-04-13 VITALS
HEIGHT: 63 IN | WEIGHT: 194 LBS | HEART RATE: 83 BPM | SYSTOLIC BLOOD PRESSURE: 111 MMHG | BODY MASS INDEX: 34.38 KG/M2 | DIASTOLIC BLOOD PRESSURE: 83 MMHG

## 2018-04-13 DIAGNOSIS — N30.10 IC (INTERSTITIAL CYSTITIS): Primary | ICD-10-CM

## 2018-04-13 DIAGNOSIS — R39.89 BLADDER PAIN: ICD-10-CM

## 2018-04-13 PROCEDURE — 99213 OFFICE O/P EST LOW 20 MIN: CPT | Mod: PBBFAC | Performed by: UROLOGY

## 2018-04-13 PROCEDURE — 51700 IRRIGATION OF BLADDER: CPT | Mod: PBBFAC | Performed by: UROLOGY

## 2018-04-13 PROCEDURE — 99214 OFFICE O/P EST MOD 30 MIN: CPT | Mod: 25,S$PBB,, | Performed by: UROLOGY

## 2018-04-13 PROCEDURE — 51700 IRRIGATION OF BLADDER: CPT | Mod: S$PBB,,, | Performed by: UROLOGY

## 2018-04-13 PROCEDURE — 99999 PR PBB SHADOW E&M-EST. PATIENT-LVL III: CPT | Mod: PBBFAC,,, | Performed by: UROLOGY

## 2018-04-13 PROCEDURE — 87086 URINE CULTURE/COLONY COUNT: CPT

## 2018-04-13 RX ORDER — DICLOFENAC POTASSIUM 50 MG/1
TABLET, FILM COATED ORAL
Refills: 0 | COMMUNITY
Start: 2018-03-29 | End: 2019-08-16

## 2018-04-13 RX ORDER — HEPARIN SODIUM 10000 [USP'U]/ML
10000 INJECTION, SOLUTION INTRAVENOUS; SUBCUTANEOUS ONCE
Status: COMPLETED | OUTPATIENT
Start: 2018-04-13 | End: 2018-04-13

## 2018-04-13 RX ORDER — BUPIVACAINE HYDROCHLORIDE 5 MG/ML
50 INJECTION, SOLUTION PERINEURAL ONCE
Status: COMPLETED | OUTPATIENT
Start: 2018-04-13 | End: 2018-04-13

## 2018-04-13 RX ORDER — LIDOCAINE HYDROCHLORIDE 20 MG/ML
10 INJECTION, SOLUTION INFILTRATION; PERINEURAL
Status: COMPLETED | OUTPATIENT
Start: 2018-04-13 | End: 2018-04-13

## 2018-04-13 RX ORDER — INDOMETHACIN 25 MG/1
50 CAPSULE ORAL
Status: COMPLETED | OUTPATIENT
Start: 2018-04-13 | End: 2018-04-13

## 2018-04-13 RX ADMIN — BUPIVACAINE HYDROCHLORIDE 250 MG: 5 INJECTION, SOLUTION PERINEURAL at 09:04

## 2018-04-13 RX ADMIN — SODIUM BICARBONATE 50 MEQ: 84 INJECTION, SOLUTION INTRAVENOUS at 09:04

## 2018-04-13 RX ADMIN — LIDOCAINE HYDROCHLORIDE 10 ML: 20 INJECTION, SOLUTION INFILTRATION; PERINEURAL at 09:04

## 2018-04-13 RX ADMIN — HEPARIN SODIUM 10000 UNITS: 10000 INJECTION, SOLUTION INTRAVENOUS; SUBCUTANEOUS at 09:04

## 2018-04-13 NOTE — PROGRESS NOTES
CC: bladder pain, s/p cysto hydrodistention and botox injection on 8/16/17    CHIEF COMPLAINT:     Mrs. Gonzales is a 42 y.o. female presenting for increased frequency, urgency, nocturia, and bladder pain.  PRESENTING ILLNESS:     Elana Gonzales is a 42 y.o. female with a PMH of IC who presents for IC flare up  Recent urine culture was negative for infection.  C/o bladder pain 7 to 8 ou of 10 in pain scale.  Her frequency urgency got better after previous botox injection.  So far OAB symptoms are not bothersome to her.  She is under stress especially due to her kids.    s/p cysto hydrodistention and botox injection on 8/16/17    Findings:   1.  Normal bladder mucosa with no lesions.  UOs in normal anatomic position  2.  100 U of Botox injected throughout detrusor  3.  hydrodistention with 550 cc and 600 cc capacity with terminal hematuria and glomerulations    C/o increased frequency, urgency, nocturia 3 to 4 x, and bladder pain.  Currently she only urinates a cupful urine at a time.     Has been on flomax, sanctura, hydroxyzine and elavil for hx of IC, frequency, urgency and incomplete bladder emptying.  She was doing well until recently when she received Cipro from her PCP.  Unable to take Elmiron because it makes her more difficult to urinate.     Since then, she has experienced worsening bladder pain with increased frequency, urgency and nocturia 3 to 4x.  Denies dysuria.  She had a cysto and hydro-distention in the past.  During cysto, her initial bladder capacity was 300 cc and was increased up to 550 cc after distention.     REVIEW OF SYSTEMS:  Constitutional: Negative for fever and chills.   HENT: Negative for hearing loss.   Eyes: Negative for visual disturbance.   Respiratory: Negative for shortness of breath.   Cardiovascular: Negative for chest pain.   Gastrointestinal: Positive for nausea.   Negative for  vomiting, and constipation.   Genitourinary:  Negative for urgency, frequency, difficulty  urinating, nocturia, incontinence, dysuria, hematuria, intermittency, hesitancy, incomplete bladder emptying, and decreased urine volume.   Neurological: Positive for dizziness.   Hematological: Does not bruise/bleed easily.   Psychiatric/Behavioral: Negative for confusion.      PATIENT HISTORY:          Past Medical History:   Diagnosis Date    Anxiety      Dizziness      Interstitial cystitis      Migraine headache                 Past Surgical History:   Procedure Laterality Date     SECTION, CLASSIC        COLONOSCOPY N/A 2016     Procedure: COLONOSCOPY;  Surgeon: Phuong Carrillo MD;  Location: [facility];  Service: Endoscopy;  Laterality: N/A;    HYSTERECTOMY        OOPHORECTOMY        TONSILLECTOMY             No family history on file.     Social History   Social History            Social History    Marital status:        Spouse name: N/A    Number of children: N/A    Years of education: N/A          Occupational History    Not on file.            Social History Main Topics    Smoking status: Never Smoker    Smokeless tobacco: Never Used    Alcohol use No    Drug use: No    Sexual activity: Yes       Partners: Male           Other Topics Concern    Not on file          Social History Narrative    No narrative on file            Allergies:  Bactrim [sulfamethoxazole-trimethoprim]; Codeine; and Zofran [ondansetron hcl (pf)]     Medications:     Current Outpatient Prescriptions:     amitriptyline (ELAVIL) 25 MG tablet, Take 1 tablet (25 mg total) by mouth nightly as needed for Insomnia., Disp: 30 tablet, Rfl: 11    butalbital-acetaminophen-caffeine -40 mg (FIORICET, ESGIC) -40 mg per tablet, Take 1-2 tablets by mouth every 6 (six) hours as needed for Headaches., Disp: 50 tablet, Rfl: 1    estradiol (ESTRACE) 1 MG tablet, Take 1 mg by mouth once daily. , Disp: , Rfl:     FLOMAX 0.4 mg Cp24, Take 1 capsule (0.4 mg total) by mouth once daily., Disp: 30  capsule, Rfl: 11    hydrOXYzine HCl (ATARAX) 25 MG tablet, Take 1 tablet (25 mg total) by mouth every evening., Disp: 30 tablet, Rfl: 11    oxybutynin (DITROPAN) 5 MG Tab, TAKE 1 TABLET BY MOUTH TWO TIMES DAILY, Disp: 60 tablet, Rfl: 12    amitriptyline (ELAVIL) 50 MG tablet, Take 1 tablet (50 mg total) by mouth every evening., Disp: 30 tablet, Rfl: 11    trospium (SANCTURA) 20 mg Tab tablet, Take 1 tablet (20 mg total) by mouth 2 (two) times daily. Take it in an empty stomach, Disp: 60 tablet, Rfl: 11     PHYSICAL EXAMINATION:     Constitutional: She is oriented to person, place, and time. She appears well-developed and well-nourished.  She is in no apparent distress.  Neck: No tracheal deviation present.      Cardiovascular: Normal rate.     Pulmonary/Chest: Effort normal. No respiratory distress.      Abdominal:  She exhibits no distension.  There is no CVA tenderness.      Lymphadenopathy:          Right: No supraclavicular adenopathy present.        Left: No supraclavicular adenopathy present.    Neurological: She is alert and oriented to person, place, and time.      Skin: Skin is warm and dry.      Extremities: No pitting edema noted in lower extremities bilaterally  Psych: Cooperative with normal affect.     Physical Exam   Constitutional: She is oriented to person, place, and time. She appears well-developed and well-nourished. No distress.   HENT:   Head: Normocephalic and atraumatic.   Right Ear: External ear normal.   Left Ear: External ear normal.   Nose: Nose normal.   Eyes: Conjunctivae and EOM are normal. Pupils are equal, round, and reactive to light. No scleral icterus.   Neck: Normal range of motion. Neck supple. No JVD present. No tracheal deviation present. No thyromegaly present.   Cardiovascular: Normal rate, regular rhythm, normal heart sounds and intact distal pulses.  Exam reveals no gallop and no friction rub.    No murmur heard.  Pulmonary/Chest: Effort normal and breath sounds  normal. No respiratory distress. She has no wheezes.   Abdominal: Soft. Bowel sounds are normal. She exhibits no distension and no mass. There is no tenderness. There is no rebound and no guarding.   Genitourinary: Vagina normal and uterus normal. No vaginal discharge found.   Musculoskeletal: Normal range of motion. She exhibits no edema, tenderness or deformity.   Lymphadenopathy:     She has no cervical adenopathy.   Neurological: She is alert and oriented to person, place, and time.   Skin: Skin is warm and dry. She is not diaphoretic.     Psychiatric: She has a normal mood and affect. Her behavior is normal. Thought content normal.            LABS:     U/a: 1.010, pH 6, negative.      IMPRESSION:          Encounter Diagnoses   Name Primary?    Interstitial cystitis Yes    Frequency-urgency syndrome      Incomplete bladder emptying      Nocturia     Elana was seen today for urinary urgency and urinary frequency.     Diagnoses and all orders for this visit:  Elana was seen today for interstitial cystitis.    Diagnoses and all orders for this visit:    IC (interstitial cystitis)  -     Urine culture  -     heparin (porcine) injection 10,000 Units; Inject 1 mL (10,000 Units total) into the skin once.  -     lidocaine HCL 20 mg/ml (2%) injection 10 mL; 10 mLs by Other route one time.  -     bupivacaine 0.5% (5 mg/ml) injection 250 mg; 50 mLs (250 mg total) by Intrapleural route once.  -     sodium bicarbonate solution 50 mEq; Inject 50 mLs (50 mEq total) into the vein one time.    Bladder pain  -     heparin (porcine) injection 10,000 Units; Inject 1 mL (10,000 Units total) into the skin once.  -     lidocaine HCL 20 mg/ml (2%) injection 10 mL; 10 mLs by Other route one time.  -     bupivacaine 0.5% (5 mg/ml) injection 250 mg; 50 mLs (250 mg total) by Intrapleural route once.  -     sodium bicarbonate solution 50 mEq; Inject 50 mLs (50 mEq total) into the vein one time.    Procedure;  Bladder Instillation  Procedure:  A 16 F Thakur catheter was placed and the bladder was drained without problems.    Intravesical cocktail mixture treatment is given in a standard fashion.  The solution of 20,000 unit heparin, 50 ml 0.5 % Marcaine, 10 ml 1% lidocaine, and 10 ml 8.4% sodium bicarbonate was instilled in the bladder, and the catheter was removed. The patient was instructed to hold the mixture solution in the bladder for 20 to 30 minutes and to void as instructed.    Patient tolerated the procedure well.    PLAN:  Take Trospium instead of oxybutynin because of worsening memory.  Continue flomax, hydroxyzine, and elavil 50 mg q hs.  Continue IC smart diet.  Consider drinking alkaline water, Evamor instead of other water.  Baking soda water prn recommended.    I spent 25 minutes with the patient of which more than half was spent in direct consultation with the patient in regards to our treatment and plan.     Follow up:  Follow-up if symptoms worsen or fail to improve.

## 2018-04-14 LAB — BACTERIA UR CULT: NO GROWTH

## 2018-06-27 ENCOUNTER — OFFICE VISIT (OUTPATIENT)
Dept: UROLOGY | Facility: CLINIC | Age: 43
End: 2018-06-27
Payer: COMMERCIAL

## 2018-06-27 VITALS
HEART RATE: 88 BPM | DIASTOLIC BLOOD PRESSURE: 80 MMHG | WEIGHT: 191.81 LBS | HEIGHT: 63 IN | BODY MASS INDEX: 33.98 KG/M2 | SYSTOLIC BLOOD PRESSURE: 125 MMHG

## 2018-06-27 DIAGNOSIS — N30.10 IC (INTERSTITIAL CYSTITIS): ICD-10-CM

## 2018-06-27 DIAGNOSIS — R82.71 BACTERIA IN URINE: Primary | ICD-10-CM

## 2018-06-27 DIAGNOSIS — R39.89 BLADDER PAIN: ICD-10-CM

## 2018-06-27 PROCEDURE — 87086 URINE CULTURE/COLONY COUNT: CPT

## 2018-06-27 PROCEDURE — 99214 OFFICE O/P EST MOD 30 MIN: CPT | Mod: PBBFAC,25 | Performed by: NURSE PRACTITIONER

## 2018-06-27 PROCEDURE — 51700 IRRIGATION OF BLADDER: CPT | Mod: PBBFAC | Performed by: NURSE PRACTITIONER

## 2018-06-27 PROCEDURE — 51701 INSERT BLADDER CATHETER: CPT | Mod: PBBFAC | Performed by: NURSE PRACTITIONER

## 2018-06-27 PROCEDURE — 99999 PR PBB SHADOW E&M-EST. PATIENT-LVL IV: CPT | Mod: PBBFAC,,, | Performed by: NURSE PRACTITIONER

## 2018-06-27 PROCEDURE — 99214 OFFICE O/P EST MOD 30 MIN: CPT | Mod: 25,S$GLB,, | Performed by: NURSE PRACTITIONER

## 2018-06-27 PROCEDURE — 51700 IRRIGATION OF BLADDER: CPT | Mod: S$GLB,,, | Performed by: NURSE PRACTITIONER

## 2018-06-27 RX ORDER — LIDOCAINE HYDROCHLORIDE 10 MG/ML
10 INJECTION INFILTRATION; PERINEURAL
Status: COMPLETED | OUTPATIENT
Start: 2018-06-27 | End: 2018-06-27

## 2018-06-27 RX ORDER — BUPIVACAINE HYDROCHLORIDE 5 MG/ML
25 INJECTION, SOLUTION PERINEURAL
Status: COMPLETED | OUTPATIENT
Start: 2018-06-27 | End: 2018-06-27

## 2018-06-27 RX ORDER — GENTAMICIN SULFATE 40 MG/ML
80 INJECTION, SOLUTION INTRAMUSCULAR; INTRAVENOUS ONCE
Status: COMPLETED | OUTPATIENT
Start: 2018-06-27 | End: 2018-06-27

## 2018-06-27 RX ADMIN — BUPIVACAINE HYDROCHLORIDE 125 MG: 5 INJECTION, SOLUTION PERINEURAL at 09:06

## 2018-06-27 RX ADMIN — GENTAMICIN SULFATE 80 MG: 40 INJECTION, SOLUTION INTRAMUSCULAR; INTRAVENOUS at 09:06

## 2018-06-27 RX ADMIN — LIDOCAINE HYDROCHLORIDE 10 ML: 10 INJECTION INFILTRATION; PERINEURAL at 09:06

## 2018-06-27 NOTE — Clinical Note
Francisca Anguiano,   I saw Mrs. Gonzales for bladder pain today. She wants to get another cysto with hydrodistention and botox. Her last one was on 8/16/17. Please let me know if you want me to set it up. Thanks!   Melanie

## 2018-06-27 NOTE — PROGRESS NOTES
CC: bladder pain, s/p cysto hydrodistention and botox injection on 8/16/17    CHIEF COMPLAINT:     Mrs. Gonzales is a 42 y.o. female presenting for bladder pain.    PRESENTING ILLNESS:     Elana Gonzales is a 42 y.o. female with a PMH of IC who presents for possible IC flare up.  C/o bladder pain 8 to 9 out of 10 in pain scale for the last few weeks.  Pyridium helps a bit.   Her frequency urgency got better after previous botox injection.  So far OAB symptoms are not bothersome to her.  She is under stress especially due to her kids.    s/p cysto hydrodistention and botox injection on 8/16/17    Findings:   1.  Normal bladder mucosa with no lesions.  UOs in normal anatomic position  2.  100 U of Botox injected throughout detrusor  3.  hydrodistention with 550 cc and 600 cc capacity with terminal hematuria and glomerulations    C/o increased frequency, urgency, nocturia 3 to 4 x, and bladder pain.  Currently she only urinates a cupful urine at a time.     Has been on flomax, sanctura, hydroxyzine and elavil for hx of IC, frequency, urgency and incomplete bladder emptying.  Unable to take Elmiron because it makes her more difficult to urinate.    She had a cysto and hydro-distention in the past.  During cysto, her initial bladder capacity was 300 cc and was increased up to 550 cc after distention.     REVIEW OF SYSTEMS:  Constitutional: Negative for fever and chills.   HENT: Negative for hearing loss.   Eyes: Negative for visual disturbance.   Respiratory: Negative for shortness of breath.   Cardiovascular: Negative for chest pain.   Gastrointestinal: Positive for nausea.   Negative for  vomiting, and constipation.   Genitourinary:  Negative for urgency, frequency, difficulty urinating, nocturia, incontinence, dysuria, hematuria, intermittency, hesitancy, incomplete bladder emptying, and decreased urine volume.   Neurological: Positive for dizziness.   Hematological: Does not bruise/bleed easily.    Psychiatric/Behavioral: Negative for confusion.      PATIENT HISTORY:          Past Medical History:   Diagnosis Date    Anxiety      Dizziness      Interstitial cystitis      Migraine headache                 Past Surgical History:   Procedure Laterality Date     SECTION, CLASSIC        COLONOSCOPY N/A 2016     Procedure: COLONOSCOPY;  Surgeon: Phuong Carrillo MD;  Location: [facility];  Service: Endoscopy;  Laterality: N/A;    HYSTERECTOMY        OOPHORECTOMY        TONSILLECTOMY             No family history on file.     Social History   Social History            Social History    Marital status:        Spouse name: N/A    Number of children: N/A    Years of education: N/A          Occupational History    Not on file.            Social History Main Topics    Smoking status: Never Smoker    Smokeless tobacco: Never Used    Alcohol use No    Drug use: No    Sexual activity: Yes       Partners: Male           Other Topics Concern    Not on file          Social History Narrative    No narrative on file            Allergies:  Bactrim [sulfamethoxazole-trimethoprim]; Codeine; and Zofran [ondansetron hcl (pf)]     Medications:     Current Outpatient Prescriptions:     amitriptyline (ELAVIL) 25 MG tablet, Take 1 tablet (25 mg total) by mouth nightly as needed for Insomnia., Disp: 30 tablet, Rfl: 11    butalbital-acetaminophen-caffeine -40 mg (FIORICET, ESGIC) -40 mg per tablet, Take 1-2 tablets by mouth every 6 (six) hours as needed for Headaches., Disp: 50 tablet, Rfl: 1    estradiol (ESTRACE) 1 MG tablet, Take 1 mg by mouth once daily. , Disp: , Rfl:     FLOMAX 0.4 mg Cp24, Take 1 capsule (0.4 mg total) by mouth once daily., Disp: 30 capsule, Rfl: 11    hydrOXYzine HCl (ATARAX) 25 MG tablet, Take 1 tablet (25 mg total) by mouth every evening., Disp: 30 tablet, Rfl: 11    oxybutynin (DITROPAN) 5 MG Tab, TAKE 1 TABLET BY MOUTH TWO TIMES DAILY, Disp: 60  tablet, Rfl: 12    amitriptyline (ELAVIL) 50 MG tablet, Take 1 tablet (50 mg total) by mouth every evening., Disp: 30 tablet, Rfl: 11    trospium (SANCTURA) 20 mg Tab tablet, Take 1 tablet (20 mg total) by mouth 2 (two) times daily. Take it in an empty stomach, Disp: 60 tablet, Rfl: 11     PHYSICAL EXAMINATION:     Constitutional: She is oriented to person, place, and time. She appears well-developed and well-nourished.  She is in no apparent distress.  Neck: No tracheal deviation present.      Cardiovascular: Normal rate.     Pulmonary/Chest: Effort normal. No respiratory distress.      Abdominal:  She exhibits no distension.  There is no CVA tenderness.      Lymphadenopathy:          Right: No supraclavicular adenopathy present.        Left: No supraclavicular adenopathy present.    Neurological: She is alert and oriented to person, place, and time.      Skin: Skin is warm and dry.      Extremities: No pitting edema noted in lower extremities bilaterally  Psych: Cooperative with normal affect.     Physical Exam   Constitutional: She is oriented to person, place, and time. She appears well-developed and well-nourished. No distress.   HENT:   Head: Normocephalic and atraumatic.   Right Ear: External ear normal.   Left Ear: External ear normal.   Nose: Nose normal.   Eyes: Conjunctivae and EOM are normal. Pupils are equal, round, and reactive to light. No scleral icterus.   Neck: Normal range of motion. Neck supple. No JVD present. No tracheal deviation present. No thyromegaly present.   Cardiovascular: Normal rate, regular rhythm, normal heart sounds and intact distal pulses.  Exam reveals no gallop and no friction rub.    No murmur heard.  Pulmonary/Chest: Effort normal and breath sounds normal. No respiratory distress. She has no wheezes.   Abdominal: Soft. Bowel sounds are normal. She exhibits no distension and no mass. There is no tenderness. There is no rebound and no guarding.   Genitourinary: Vagina normal  and uterus normal. No vaginal discharge found.   Musculoskeletal: Normal range of motion. She exhibits no edema, tenderness or deformity.   Lymphadenopathy:     She has no cervical adenopathy.   Neurological: She is alert and oriented to person, place, and time.   Skin: Skin is warm and dry. She is not diaphoretic.     Psychiatric: She has a normal mood and affect. Her behavior is normal. Thought content normal.            LABS:     U/a: 1.015, pH 6, + nit     IMPRESSION:          Encounter Diagnoses   Name Primary?    Interstitial cystitis Yes    Frequency-urgency syndrome      Incomplete bladder emptying      Nocturia     Elana was seen today for urinary urgency and urinary frequency.     Diagnoses and all orders for this visit:  Elana was seen today for bladder pain.    Diagnoses and all orders for this visit:    Bacteria in urine  -     bupivacaine 0.5% (5 mg/ml) injection 125 mg; 25 mLs (125 mg total) by Intrapleural route one time.  -     lidocaine HCL 10 mg/ml (1%) injection 10 mL; 10 mLs by Other route one time.  -     gentamicin injection 80 mg; Inject 80 mg into the muscle once.    Bladder pain  -     bupivacaine 0.5% (5 mg/ml) injection 125 mg; 25 mLs (125 mg total) by Intrapleural route one time.  -     lidocaine HCL 10 mg/ml (1%) injection 10 mL; 10 mLs by Other route one time.  -     gentamicin injection 80 mg; Inject 80 mg into the muscle once.    IC (interstitial cystitis)       Procedure;  Bladder Instillation Procedure:  A 10 F Thakur catheter was placed and the bladder was drained without problems. PVR with in and out cath was 20 cc.     Intravesical cocktail mixture treatment is given in a standard fashion.  The solution of 25 ml 0.5 % Marcaine, 10 ml 1% lidocaine, and gent 80 mg was instilled in the bladder, and the catheter was removed. The patient was instructed to hold the mixture solution in the bladder for 20 to 30 minutes and to void as instructed. She was instructed to notify the  clinic if unable to urinate.     Patient tolerated the procedure well.    PLAN:  Reassured pt and discussed plan of care,   UC sent to the lab from cath urine. Will notify her with results.   Continue flomax, hydroxyzine, trospium, and elavil 50 mg q hs.  Continue IC smart diet.  Consider drinking alkaline water, Evamor instead of other water.  Baking soda water prn recommended.  She wanted to have a repeat cysto with hydrodistention and botox. Will discuss with Dr. Anguiano.   RTC prn    I spent 25 minutes with the patient of which more than half was spent in direct consultation with the patient in regards to our treatment and plan.     Follow up:  No Follow-up on file.

## 2018-06-28 LAB — BACTERIA UR CULT: NO GROWTH

## 2018-06-29 ENCOUNTER — TELEPHONE (OUTPATIENT)
Dept: UROLOGY | Facility: CLINIC | Age: 43
End: 2018-06-29

## 2018-06-29 DIAGNOSIS — N30.10 IC (INTERSTITIAL CYSTITIS): Primary | ICD-10-CM

## 2018-06-29 DIAGNOSIS — R39.89 BLADDER PAIN: ICD-10-CM

## 2018-06-29 DIAGNOSIS — N32.81 OAB (OVERACTIVE BLADDER): ICD-10-CM

## 2018-07-31 ENCOUNTER — TELEPHONE (OUTPATIENT)
Dept: UROLOGY | Facility: CLINIC | Age: 43
End: 2018-07-31

## 2018-08-07 ENCOUNTER — TELEPHONE (OUTPATIENT)
Dept: UROLOGY | Facility: CLINIC | Age: 43
End: 2018-08-07

## 2018-08-07 DIAGNOSIS — N30.10 IC (INTERSTITIAL CYSTITIS): Primary | ICD-10-CM

## 2018-08-07 NOTE — TELEPHONE ENCOUNTER
IC (interstitial cystitis)  -     Case Request Operating Room: CYSTOSCOPY,WITH BLADDER HYDRODISTENSION, INJECTION, BOTULINUM TOXIN, TYPE A 100 UNITS, INSTILLATION, URINARY BLADDER

## 2018-08-28 ENCOUNTER — TELEPHONE (OUTPATIENT)
Dept: UROLOGY | Facility: CLINIC | Age: 43
End: 2018-08-28

## 2018-08-28 NOTE — TELEPHONE ENCOUNTER
Pt called to cx her procedure with Dr. Anguiano scheduled on 8/29. Pt states she will call back to r/s.

## 2018-12-27 DIAGNOSIS — R33.9 INCOMPLETE BLADDER EMPTYING: ICD-10-CM

## 2019-01-02 RX ORDER — TAMSULOSIN HYDROCHLORIDE 0.4 MG/1
1 CAPSULE ORAL DAILY
Qty: 90 CAPSULE | Refills: 3 | Status: SHIPPED | OUTPATIENT
Start: 2019-01-02 | End: 2019-08-16 | Stop reason: SDUPTHER

## 2019-01-31 NOTE — PROGRESS NOTES
"    [facility] Consultation Note    Reason for Consult:  The encounter diagnosis was Epigastric abdominal pain.    PCP:   Nick Reeves Jr       Referring MD:  Nick Reeves Jr., Md  [address] [place] [address]    Initial HPI:  This is a 43 y.o. female here for evaluation of abd pain  Had possible diverticulitis 2 years ago but repeat CT normal and colon normal.  States she vomits when she takes aspirin or alcohol.  States symptoms get better when she takes flagyl    Abdominal pain - no  Reflux - no  Dysphagia - no   Bowel habits - "i'm never regular". Takes miralax for constipation, imodium for diarrhea.  GI bleeding - none  NSAID usage - used to take excedrin/BC powder years ago    Interval HPI 2019:      ROS:  Constitutional: No fevers, chills, No weight loss  ENT: No allergies  CV: No chest pain  Pulm: No cough, No shortness of breath  Ophtho: No vision changes  GI: see HPI  Derm: No rash  Heme: No lymphadenopathy, No bruising  MSK: + crush injury on foot treated with percocets  : + interstitial cystitis, + BV  Endo: No hot or cold intolerance  Neuro: + migraines  Psych: No anxiety, No depression    Medical History:  has a past medical history of Anxiety, Dizziness, Interstitial cystitis, and Migraine headache.    Surgical History:  has a past surgical history that includes Hysterectomy;  section, classic; Oophorectomy; Colonoscopy (N/A, 2016); Tonsillectomy; injection of botulinum toxin type a (N/A, 2018); and instillation of urinary bladder (N/A, 2018).    Family History: family history is not on file..     Social History:  reports that  has never smoked. she has never used smokeless tobacco. She reports that she does not drink alcohol or use drugs.    Review of patient's allergies indicates:   Allergen Reactions    Bactrim [sulfamethoxazole-trimethoprim] Other (See Comments)     headache    Codeine Other (See Comments)     Other " "reaction(s): Vomiting       Medication List with Changes/Refills   New Medications    DICYCLOMINE (BENTYL) 20 MG TABLET    Take 1 tablet (20 mg total) by mouth before meals as needed (tid prn).   Current Medications    BUTALBITAL-ACETAMINOPHEN-CAFFEINE -40 MG (FIORICET, ESGIC) -40 MG PER TABLET    Take 1-2 tablets by mouth every 6 (six) hours as needed for Headaches.    DICLOFENAC (CATAFLAM) 50 MG TABLET    TAKE 1 TABLET BY MOUTH TWO TIMES DAILY AS NEEDED FOR PAIN    FLOMAX 0.4 MG CAP    Take 1 capsule (0.4 mg total) by mouth once daily.    HYDROXYZINE HCL (ATARAX) 25 MG TABLET    Take 1 tablet (25 mg total) by mouth every evening.    OXYBUTYNIN (DITROPAN-XL) 10 MG 24 HR TABLET    Take 1 tablet (10 mg total) by mouth every evening.    OXYCODONE-ACETAMINOPHEN (PERCOCET)  MG PER TABLET    Take 1 tablet by mouth every 12 (twelve) hours as needed.   Discontinued Medications    APIXABAN (ELIQUIS) 5 MG TAB    Take 5 mg by mouth 2 (two) times daily.    ESTRADIOL (ESTRACE) 1 MG TABLET    Take 1 mg by mouth once daily.          Objective Findings:    Vital Signs:  /82   Pulse 91   Ht 5' 3" (1.6 m)   Wt 91.5 kg (201 lb 11.5 oz)   LMP 08/26/2008   BMI 35.73 kg/m²   Body mass index is 35.73 kg/m².    Physical Exam:  General Appearance: Well appearing in no acute distress  Head:   Normocephalic, without obvious abnormality  Eyes:    No scleral icterus, EOMI  ENT: Neck supple, Lips, mucosa, and tongue normal; teeth and gums normal  Lungs: CTA bilaterally in anterior and posterior fields, no wheezes, no crackles.  Heart:  Regular rate and rhythm, S1, S2 normal, no murmurs heard  Abdomen: Soft, non tender, non distended with positive bowel sounds in all four quadrants. No hepatosplenomegaly, ascites, or mass  Extremities: 2+ pulses, no clubbing, cyanosis or edema  Skin: No rash  Neurologic: CN II-XII intact      Labs:  Lab Results   Component Value Date    WBC 7.64 04/18/2017    HGB 13.2 04/18/2017    " HCT 40.0 04/18/2017     04/18/2017    CHOL 211 (H) 08/03/2007    TRIG 72 08/03/2007    HDL 94 (H) 08/03/2007    ALT 28 09/08/2016    AST 21 09/08/2016     09/08/2016    K 4.0 09/08/2016     09/08/2016    CREATININE 0.8 09/08/2016    BUN 17 09/08/2016    CO2 23 09/08/2016    TSH 1.5 08/03/2007    INR 0.9 09/08/2016           Imaging:    Endoscopy:    Colon 2016 - wnl      Assessment:  1. Epigastric abdominal pain      Likely IBS with history of IC and migraines as well    Recommendations:  1. EGD with biopsy for H pylori and celiac  2. Bentyl prn  3. Fiber for irregular bowel habits    No Follow-up on file.      Order summary:  Orders Placed This Encounter    dicyclomine (BENTYL) 20 mg tablet    Case request GI: ESOPHAGOGASTRODUODENOSCOPY (EGD)         Thank you so much for allowing me to participate in the care of Elana Main MD

## 2019-02-04 ENCOUNTER — TELEPHONE (OUTPATIENT)
Dept: ENDOSCOPY | Facility: HOSPITAL | Age: 44
End: 2019-02-04

## 2019-02-04 ENCOUNTER — OFFICE VISIT (OUTPATIENT)
Dept: GASTROENTEROLOGY | Facility: CLINIC | Age: 44
End: 2019-02-04
Payer: COMMERCIAL

## 2019-02-04 VITALS
BODY MASS INDEX: 35.75 KG/M2 | DIASTOLIC BLOOD PRESSURE: 82 MMHG | SYSTOLIC BLOOD PRESSURE: 110 MMHG | WEIGHT: 201.75 LBS | HEART RATE: 91 BPM | HEIGHT: 63 IN

## 2019-02-04 DIAGNOSIS — R10.13 EPIGASTRIC ABDOMINAL PAIN: Primary | ICD-10-CM

## 2019-02-04 PROCEDURE — 3008F BODY MASS INDEX DOCD: CPT | Mod: CPTII,S$GLB,, | Performed by: INTERNAL MEDICINE

## 2019-02-04 PROCEDURE — 99204 PR OFFICE/OUTPT VISIT, NEW, LEVL IV, 45-59 MIN: ICD-10-PCS | Mod: S$GLB,,, | Performed by: INTERNAL MEDICINE

## 2019-02-04 PROCEDURE — 99204 OFFICE O/P NEW MOD 45 MIN: CPT | Mod: S$GLB,,, | Performed by: INTERNAL MEDICINE

## 2019-02-04 PROCEDURE — 3008F PR BODY MASS INDEX (BMI) DOCUMENTED: ICD-10-PCS | Mod: CPTII,S$GLB,, | Performed by: INTERNAL MEDICINE

## 2019-02-04 PROCEDURE — 99999 PR PBB SHADOW E&M-EST. PATIENT-LVL III: ICD-10-PCS | Mod: PBBFAC,,, | Performed by: INTERNAL MEDICINE

## 2019-02-04 PROCEDURE — 99999 PR PBB SHADOW E&M-EST. PATIENT-LVL III: CPT | Mod: PBBFAC,,, | Performed by: INTERNAL MEDICINE

## 2019-02-04 RX ORDER — OXYCODONE AND ACETAMINOPHEN 10; 325 MG/1; MG/1
1 TABLET ORAL EVERY 12 HOURS PRN
Refills: 0 | COMMUNITY
Start: 2018-12-12 | End: 2024-03-18 | Stop reason: CLARIF

## 2019-02-04 RX ORDER — DICYCLOMINE HYDROCHLORIDE 20 MG/1
20 TABLET ORAL
Qty: 90 TABLET | Refills: 3 | Status: SHIPPED | OUTPATIENT
Start: 2019-02-04 | End: 2019-08-16

## 2019-02-04 NOTE — LETTER
February 4, 2019      Nick Reeves Jr., MD  [address] [facility] [address]           [facility]enter[facility]  [address]  [place] [address]  Phone: [phone]  Fax: [phone]          Patient: Elana Gonzales   MR Number: [id number]   YOB: 1975   Date of Visit: 2/4/2019       Dear Dr. Nick Reeves Jr.:    Thank you for referring Elana Gonzales to me for evaluation. Attached you will find relevant portions of my assessment and plan of care.    If you have questions, please do not hesitate to call me. I look forward to following Elana Gonzales along with you.    Sincerely,    Luis Main MD    Enclosure  CC:  No Recipients    If you would like to receive this communication electronically, please contact [email or web address] or [phone] to request more information on [employer] Link access.    For providers and/or their staff who would like to refer a patient to [facility], please contact us through our one-stop-shop provider referral line, [facility], at [phone].    If you feel you have received this communication in error or would no longer like to receive these types of communications, please e-mail [email or web address]

## 2019-02-04 NOTE — PATIENT INSTRUCTIONS
HIGH FIBER KEY POINTS:  1. Goal of 20-25 grams of fiber each day for women, 30-35 grams each day for men. Slowly build up to this goal as you may experience gas and bloating at first.  2. Take a fiber supplement to help reach this goal: Metamucil, Citrucel, Fibercon, Konsyl, or Psyllium  3. For Constipation: Finely ground psyllium husk (with or without flavoring or                                              Additives)   - To start: 1 teaspoon once a day in the morning with 8 oz of liquid    followed by an additional 8 ounce glass of water   - After a week: add a second teaspoon in the middle of the day   - After two weeks: add a third teaspoon at bedtime   - Follow each dose with another glass of water. Can add a splash of juice   or lemonade for taste.  3. Drink 6-8 glasses of water a day.  4. Try to do plant fiber (fruits and vegetables) over processed fibers (cereals and breads)     HIGH FIBER DIET  Fiber is present in all fruits, vegetables, cereals and grains. Fiber passes through the body undigested. A high fiber diet helps food move through the intestinal tract. The added bulk is helpful in preventing constipation. In persons with diverticulosis it serves to clean out the pouches along the colon wall while preventing new ones from forming. A high fiber diet may reduce the risk of colon cancer, decreases blood cholesterol and prevents high blood sugar in diabetics.    The foods listed below are high in fiber and should be included in your diet. If you are not used to high fiber foods, start with 1 or 2 foods from this list. Every 3-4 days add a new one to your diet until you are eating 4 high fiber foods per day. This should give you 20-35 Gm of fiber/day. It is also important to drink a lot of water when you are on this diet (6-8 glasses a day). Water causes the fiber to swell and increases the benefit.  Add Fiber to Your Diet   Adding fiber to your diet can help relieve constipation by making stools softer  and easier to pass. To increase your fiber intake, your doctor may recommend a bulking agent, such as psyllium. This is a high-fiber supplement available at most grocery and drugstores. Eating more fiber-rich foods will also help. There are two types of fiber:   Insoluble fiber is the main ingredient in bulking agents. Its also found in foods such as wheat bran, whole-grain breads, fresh fruits, and vegetables.   Soluble fiber is found in foods such as oat bran. Although soluble fiber is good for you, it may not ease constipation as much as foods high in insoluble fiber.    FOODS HIGH IN DIETARY FIBER:  BREADS: Made with 100% whole wheat flour; Uriel, wheat or rye crackers; tortillas, bran muffins. Whole grains, such as wheat bran, corn bran, and brown rice.  CEREALS: Whole grain cereal with bran (Chex, Raisin Bran, Corn Bran), oatmeal, rolled oats, granola, wheat flakes, brown rice  NUTS: Any nuts  FRUITS: All fresh fruits along with edible skins, (bananas, citrus fruit, mangoes, pears, prunes, raisins, apples, pineapple, apricot, melon, jams and marmalades), fruit juices (especially prune juice)  VEGETABLES: All types, preferably raw or lightly cooked: especially, celery, eggplant, potatoes,spinach, broccoli, brussel sprouts, winter squash, carrots, cauliflower, soybeans, lentils, fresh and dried beans of all kinds  OTHER: Popcorn, any spices.   Nuts and legumes, especially peanuts, lentils, and kidney beans.  Easy Ways to Add Fiber   The tips below offer some simple ways to add more high-fiber foods to your meals.   Start your day with a high-fiber breakfast. Eat a wheat bran cereal along with a sliced banana. Or, try peanut butter on whole-wheat toast.   Eat carrot sticks for snacks. Theyre easy to prepare, taste great, and are low in calories.   Use whole-grain breads instead of white bread for sandwiches.   Eat fruits for treats. Try an apple and some raisins instead of a candy bar.  Vegetables  Artichoke,  cooked 10.3  Asparagus - 2.8  Beans - 2  Broccoli, boiled 1 cup - 5.1  [place] sprouts, cooked 1 cup - 4.1  Carrots - boiled 2.5, raw 4  Celery - 1  Corn - 4  Corn on the Cob - 5.9  Lettuce - 1  Peas (canned) - 4  Peas (dried) - 7.9  Green peas (cooked) - 8.8  Potato, with skin, baked - 3.0  Spinach - 4  Sweet potato - 3.4  Turnip greens, boiled 1 cup - 5.0  Sweet corn, cooked  1 cup  4.0  Tomato paste -1/4 cup -2.7  Yam - 2.7  Legumes, Nuts, and Seeds  Split peas, cooked  1 cup  16.3  Lentils, cooked 1 cup 15.6  Black beans, cooked 1 cup 15.0  Black eyed peas (1/2 cup) 4.2  Chick peas (1/2 cup) 5  Lima beans, cooked 1 cup  13.2  Baked beans, vegetarian, canned, cooked 1 cup 10.4  Sunflower seed kernels 1/4 cup 3.9  Almonds 1 ounce (23 nuts)  3.5  Pistachio nuts 1 ounce (49 nuts) 2.9  Pecans 1 ounce (19 halves) 2.7  Beans (lima,kidney,baked) - 10 (1/2 cup)  Refried beans -12 (1cup)  Lentils - 8  Soybeans (1/2 cup) 5  Fruit  Raspberries  1 cup  8.0  Pear, with skin - 5.5  Apple, with skin 4.4 (Juice = 0)  Banana - 3.1  Orange - 3.1  Strawberries (halves) 1 cup - 3.0  Figs, dried 2 medium - 1.6  Raisins 1 ounce (60 raisins) -1.0  Grapefruit - 1.4  Peach - 2  Kiwi - 5  Jeremy - 3.7  Pineapple - 2  Cereal, Grains, and Pasta  Spaghetti, whole-wheat, cooked 1 cup 6.3  Barley, pearled, cooked 1 cup 6.0  Bran flakes 3/4 cup 5.3  Oat bran muffin 1 medium 5.2  Oatmeal, instant, cooked 1 cup 4.0  Popcorn, air-popped 3 cups 3.5  Brown rice, cooked  1 cup  3.5  Bread, rye 1 slice 1.9, pumpernickel - 2.1  Bagel - 1.6  Bread, whole-wheat or multigrain  1 slice 1.9  Fiber One - 14  100% Bran - 13  Raisin Bran - 3.5  All Bran Extra Fiber - 13

## 2019-02-07 ENCOUNTER — TELEPHONE (OUTPATIENT)
Dept: UROLOGY | Facility: CLINIC | Age: 44
End: 2019-02-07

## 2019-02-07 DIAGNOSIS — N32.81 OAB (OVERACTIVE BLADDER): ICD-10-CM

## 2019-02-07 DIAGNOSIS — R39.89 BLADDER PAIN: ICD-10-CM

## 2019-02-07 DIAGNOSIS — N30.10 IC (INTERSTITIAL CYSTITIS): Primary | ICD-10-CM

## 2019-02-07 NOTE — TELEPHONE ENCOUNTER
s/p cysto hydrodistention and botox injection on 8/16/17     Findings:   1.  Normal bladder mucosa with no lesions.  UOs in normal anatomic position  2.  100 U of Botox injected throughout detrusor  3.  hydrodistention with 550 cc and 600 cc capacity with terminal hematuria and glomerulations    IC (interstitial cystitis)  -     CBC auto differential; Future; Expected date: 02/07/2019  -     Comprehensive metabolic panel; Future; Expected date: 02/07/2019  -     Urine culture; Future; Expected date: 02/14/2019    OAB (overactive bladder)  -     CBC auto differential; Future; Expected date: 02/07/2019  -     Comprehensive metabolic panel; Future; Expected date: 02/07/2019    Bladder pain  -     CBC auto differential; Future; Expected date: 02/07/2019  -     Comprehensive metabolic panel; Future; Expected date: 02/07/2019      I talked to the pt.  She would like to undergo cysto hdyrodistention and botox injection.  Please schedule cysto hydrodistention, bladder instillation, botox injection 100 units under general anesth for 45 mins.  She can do the blood tests and home collect urine for culture.

## 2019-02-07 NOTE — TELEPHONE ENCOUNTER
IC (interstitial cystitis)  -     Case Request Operating Room: CYSTOSCOPY, WITH BLADDER HYDRODISTENSION, INSTILLATION, BLADDER, INJECTION, BOTULINUM TOXIN, TYPE A 100 UNITS    OAB (overactive bladder)  -     Case Request Operating Room: CYSTOSCOPY, WITH BLADDER HYDRODISTENSION, INSTILLATION, BLADDER, INJECTION, BOTULINUM TOXIN, TYPE A 100 UNITS    Bladder pain  -     Case Request Operating Room: CYSTOSCOPY, WITH BLADDER HYDRODISTENSION, INSTILLATION, BLADDER, INJECTION, BOTULINUM TOXIN, TYPE A 100 UNITS

## 2019-02-07 NOTE — TELEPHONE ENCOUNTER
----- Message from Sabina Mills LPN sent at 2/7/2019  9:28 AM CST -----  Contact: self [phone]  Last seen 6 months ago and cancelled procedure, do you need to see her again or JEANETH  ----- Message -----  From: Nikki Walton  Sent: 2/7/2019   9:05 AM  To: Silvio ROMEO Staff    Pt called stated she is ready to re-schedule her Cystoscopy. She need to know if she have to see Dr. Anguiano before she scheduling her Cystoscopy that she canceled on 8/21/18.  Please call pt @ [phone] to advise

## 2019-02-28 ENCOUNTER — TELEPHONE (OUTPATIENT)
Dept: UROLOGY | Facility: CLINIC | Age: 44
End: 2019-02-28

## 2019-02-28 ENCOUNTER — ANESTHESIA EVENT (OUTPATIENT)
Dept: SURGERY | Facility: HOSPITAL | Age: 44
End: 2019-02-28
Payer: COMMERCIAL

## 2019-02-28 DIAGNOSIS — R39.89 BLADDER PAIN: Primary | ICD-10-CM

## 2019-02-28 RX ORDER — PHENAZOPYRIDINE HYDROCHLORIDE 200 MG/1
200 TABLET, FILM COATED ORAL 3 TIMES DAILY PRN
Qty: 30 TABLET | Refills: 10 | Status: SHIPPED | OUTPATIENT
Start: 2019-02-28 | End: 2019-03-10

## 2019-02-28 NOTE — ANESTHESIA PREPROCEDURE EVALUATION
Geena Chery RN   Registered Nurse      Pre Admission Screening   Signed                             []Hide copied text    []Hover for details      Anesthesia Assessment: Preoperative EQUATION     Planned Procedure: Procedure(s) (LRB):  CYSTOSCOPY, WITH BLADDER HYDRODISTENSION (N/A)  INSTILLATION, BLADDER (N/A)  INJECTION, BOTULINUM TOXIN, TYPE A 100 UNITS (N/A)  Requested Anesthesia Type:General  Surgeon: Yong Anguiano MD  Service: Urology  Known or anticipated Date of Surgery:3/13/2019     Surgeon notes: reviewed     Electronic QUestionnaire Assessment completed via nurse interview with patient.         No AQ        Triage considerations:      The patient has no apparent active cardiac condition (No unstable coronary Syndrome such as severe unstable angina or recent [<1 month] myocardial infarction, decompensated CHF, severe valvular   disease or significant arrhythmia)     Previous anesthesia records:GETA, MAC and Complications noted-HX of PONV  8/16/17 cysto:   Airway/Jaw/Neck:  Airway Findings: Mouth Opening: Normal Tongue: Normal  General Airway Assessment: Adult  Mallampati: II  TM Distance: Normal, at least 6 cm                Last PCP note: outside [facility]   Subspecialty notes: Gastroenterology     Other important co-morbidities: HX DVT, DANIEL, obesity     Tests already available:  Available tests,  6-12 months ago . 7/2018 EKG. 2017 CBC                            Instructions given. (See in Nurse's note)     Optimization:  Anesthesia Preop Clinic Assessment  Indicated-not required for this procedure                Plan:    Testing:  BMP                           Patient  has previously scheduled Medical Appointment: 3/4 lab and EGD     Navigation: Tests Scheduled. 3/4                                    Results will be tracked by Preop Clinic.                               Electronically signed by Geena Chery RN at 2/28/2019 11:37 AM       Pre-admit on 3/13/2019            Detailed Report     3/4/19 lab results noted.                                                                                                                02/28/2019  Elana Gonzales is a 43 y.o., female.    Anesthesia Evaluation         Review of Systems  Anesthesia Hx:  Hx of Anesthetic complications History of prior surgery of interest to airway management or planning: Previous anesthesia: MAC  8/2018 cysto with MAC.  Procedure performed at an [facility] Facility. Denies Family Hx of Anesthesia complications.  Personal Hx of Anesthesia complications, Post-Operative Nausea/Vomiting, in the past, but not with recent anesthetics / prophylaxis   Hematology/Oncology:  Hematology Normal   Oncology Normal     EENT/Dental:EENT/Dental Normal   Cardiovascular:    Denies Angina.  Functional Capacity 3 METS  Deep Venous Thrombosis (DVT) (completed 3 month course of Eliquis may 2018), Hx of DVT    Pulmonary:   Denies Shortness of breath.  Denies Recent URI.  Obstructive Sleep Apnea (DANIEL), CPAP prescribed.   Renal/:  Other Renal / Gu Conditions: Interstitial Cystitis  Hepatic/GI:  Hepatic/GI Symptoms: nausea, abdominal pain.    Musculoskeletal:   Foot pain   Neurological:   Headaches    Endocrine:  Metabolic Disorders, Obesity / BMI > 30      Physical Exam  General:  Well nourished, Obesity    Airway/Jaw/Neck:  Airway Findings: Mouth Opening: Normal Tongue: Normal  General Airway Assessment: Adult  Mallampati: III  Improves to II with phonation.  TM Distance: Normal, at least 6 cm            Mental Status:  Mental Status Findings:  Cooperative, Alert and Oriented         Anesthesia Plan  Type of Anesthesia, risks & benefits discussed:  Anesthesia Type:  general  Patient's Preference:   Intra-op Monitoring Plan: standard ASA monitors  Intra-op Monitoring Plan Comments:   Post Op Pain Control Plan: IV/PO Opioids PRN  Post Op Pain Control Plan Comments:   Induction:   IV  Beta Blocker:  Patient is not currently on a Beta-Blocker (No further  documentation required).       Informed Consent: Patient understands risks and agrees with Anesthesia plan.  Questions answered. Anesthesia consent signed with patient.  ASA Score: 2     Day of Surgery Review of History & Physical:    H&P update referred to the surgeon.         Ready For Surgery From Anesthesia Perspective.

## 2019-02-28 NOTE — TELEPHONE ENCOUNTER
Bladder pain  -     phenazopyridine (PYRIDIUM) 200 MG tablet; Take 1 tablet (200 mg total) by mouth 3 (three) times daily as needed for Pain.  Dispense: 30 tablet; Refill: 10    eRxed to the pharmacy.  Please call and advise the patient to take the medication as given.

## 2019-02-28 NOTE — PRE ADMISSION SCREENING
Anesthesia Assessment: Preoperative EQUATION    Planned Procedure: Procedure(s) (LRB):  CYSTOSCOPY, WITH BLADDER HYDRODISTENSION (N/A)  INSTILLATION, BLADDER (N/A)  INJECTION, BOTULINUM TOXIN, TYPE A 100 UNITS (N/A)  Requested Anesthesia Type:General  Surgeon: Yong Anguiano MD  Service: Urology  Known or anticipated Date of Surgery:3/13/2019    Surgeon notes: reviewed    Electronic QUestionnaire Assessment completed via nurse interview with patient.        No AQ      Triage considerations:     The patient has no apparent active cardiac condition (No unstable coronary Syndrome such as severe unstable angina or recent [<1 month] myocardial infarction, decompensated CHF, severe valvular   disease or significant arrhythmia)    Previous anesthesia records:GETA, MAC and Complications noted-HX of PONV  8/16/17 cysto:   Airway/Jaw/Neck:  Airway Findings: Mouth Opening: Normal Tongue: Normal  General Airway Assessment: Adult  Mallampati: II  TM Distance: Normal, at least 6 cm             Last PCP note: outside [facility]   Subspecialty notes: Gastroenterology    Other important co-morbidities: HX DVT, DANIEL, obesity     Tests already available:  Available tests,  6-12 months ago . 7/2018 EKG. 2017 CBC            Instructions given. (See in Nurse's note)    Optimization:  Anesthesia Preop Clinic Assessment  Indicated-not required for this procedure      Plan:    Testing:  BMP     Patient  has previously scheduled Medical Appointment: 3/4 lab and EGD    Navigation: Tests Scheduled. 3/4                  Results will be tracked by Preop Clinic.

## 2019-02-28 NOTE — TELEPHONE ENCOUNTER
----- Message from Shannan Frank LPN sent at 2/27/2019  3:38 PM CST -----  Contact: pt: [phone]      ----- Message -----  From: Ender Black  Sent: 2/27/2019   3:14 PM  To: Silvio ROMEO Staff    Rx Refill/Request     Is this a Refill or New Rx:  Refill     Rx Name and Strength:  Phenazophyrid 200 mg     [facility] with phone number[facility] [place] [address] [phone] (Phone)  [phone] (Fax)      Communication Preference: pt: [phone]

## 2019-03-04 ENCOUNTER — ANESTHESIA EVENT (OUTPATIENT)
Dept: ENDOSCOPY | Facility: HOSPITAL | Age: 44
End: 2019-03-04
Payer: COMMERCIAL

## 2019-03-04 ENCOUNTER — ANESTHESIA (OUTPATIENT)
Dept: ENDOSCOPY | Facility: HOSPITAL | Age: 44
End: 2019-03-04
Payer: COMMERCIAL

## 2019-03-04 ENCOUNTER — HOSPITAL ENCOUNTER (OUTPATIENT)
Facility: HOSPITAL | Age: 44
Discharge: HOME | End: 2019-03-04
Attending: INTERNAL MEDICINE | Admitting: INTERNAL MEDICINE
Payer: COMMERCIAL

## 2019-03-04 VITALS
DIASTOLIC BLOOD PRESSURE: 62 MMHG | OXYGEN SATURATION: 96 % | TEMPERATURE: 98 F | BODY MASS INDEX: 34.55 KG/M2 | HEIGHT: 63 IN | HEART RATE: 77 BPM | RESPIRATION RATE: 16 BRPM | WEIGHT: 195 LBS | SYSTOLIC BLOOD PRESSURE: 101 MMHG

## 2019-03-04 DIAGNOSIS — R10.9 RIGHT FLANK PAIN: Primary | ICD-10-CM

## 2019-03-04 DIAGNOSIS — K21.9 GERD (GASTROESOPHAGEAL REFLUX DISEASE): ICD-10-CM

## 2019-03-04 PROCEDURE — 00731 ANES UPR GI NDSC PX NOS: CPT | Performed by: INTERNAL MEDICINE

## 2019-03-04 PROCEDURE — 88342 IMHCHEM/IMCYTCHM 1ST ANTB: CPT | Mod: 26,,, | Performed by: PATHOLOGY

## 2019-03-04 PROCEDURE — 88305 TISSUE SPECIMEN TO PATHOLOGY - SURGERY: ICD-10-PCS | Mod: 26,,, | Performed by: PATHOLOGY

## 2019-03-04 PROCEDURE — 27201012 HC FORCEPS, HOT/COLD, DISP: Performed by: INTERNAL MEDICINE

## 2019-03-04 PROCEDURE — E9220 PRA ENDO ANESTHESIA: HCPCS | Mod: ,,, | Performed by: NURSE ANESTHETIST, CERTIFIED REGISTERED

## 2019-03-04 PROCEDURE — 88305 TISSUE EXAM BY PATHOLOGIST: CPT | Performed by: PATHOLOGY

## 2019-03-04 PROCEDURE — 25000003 PHARM REV CODE 250: Performed by: INTERNAL MEDICINE

## 2019-03-04 PROCEDURE — 43239 EGD BIOPSY SINGLE/MULTIPLE: CPT | Mod: ,,, | Performed by: INTERNAL MEDICINE

## 2019-03-04 PROCEDURE — 37000009 HC ANESTHESIA EA ADD 15 MINS: Performed by: INTERNAL MEDICINE

## 2019-03-04 PROCEDURE — 63600175 PHARM REV CODE 636 W HCPCS: Performed by: NURSE ANESTHETIST, CERTIFIED REGISTERED

## 2019-03-04 PROCEDURE — 43239 PR EGD, FLEX, W/BIOPSY, SGL/MULTI: ICD-10-PCS | Mod: ,,, | Performed by: INTERNAL MEDICINE

## 2019-03-04 PROCEDURE — E9220 PRA ENDO ANESTHESIA: ICD-10-PCS | Mod: ,,, | Performed by: NURSE ANESTHETIST, CERTIFIED REGISTERED

## 2019-03-04 PROCEDURE — 88342 TISSUE SPECIMEN TO PATHOLOGY - SURGERY: ICD-10-PCS | Mod: 26,,, | Performed by: PATHOLOGY

## 2019-03-04 PROCEDURE — 88305 TISSUE EXAM BY PATHOLOGIST: CPT | Mod: 26,,, | Performed by: PATHOLOGY

## 2019-03-04 PROCEDURE — 43239 EGD BIOPSY SINGLE/MULTIPLE: CPT | Performed by: INTERNAL MEDICINE

## 2019-03-04 PROCEDURE — 27201038 HC PROBE, BI-POLAR: Performed by: INTERNAL MEDICINE

## 2019-03-04 PROCEDURE — 37000008 HC ANESTHESIA 1ST 15 MINUTES: Performed by: INTERNAL MEDICINE

## 2019-03-04 RX ORDER — SODIUM CHLORIDE 9 MG/ML
INJECTION, SOLUTION INTRAVENOUS CONTINUOUS
Status: DISCONTINUED | OUTPATIENT
Start: 2019-03-04 | End: 2019-03-04 | Stop reason: HOSPADM

## 2019-03-04 RX ORDER — PROPOFOL 10 MG/ML
VIAL (ML) INTRAVENOUS CONTINUOUS PRN
Status: DISCONTINUED | OUTPATIENT
Start: 2019-03-04 | End: 2019-03-04

## 2019-03-04 RX ORDER — PROPOFOL 10 MG/ML
VIAL (ML) INTRAVENOUS
Status: DISCONTINUED | OUTPATIENT
Start: 2019-03-04 | End: 2019-03-04

## 2019-03-04 RX ORDER — LIDOCAINE HCL/PF 100 MG/5ML
SYRINGE (ML) INTRAVENOUS
Status: DISCONTINUED | OUTPATIENT
Start: 2019-03-04 | End: 2019-03-04

## 2019-03-04 RX ORDER — SODIUM CHLORIDE 0.9 % (FLUSH) 0.9 %
3 SYRINGE (ML) INJECTION
Status: DISCONTINUED | OUTPATIENT
Start: 2019-03-04 | End: 2019-03-04 | Stop reason: HOSPADM

## 2019-03-04 RX ADMIN — SODIUM CHLORIDE: 0.9 INJECTION, SOLUTION INTRAVENOUS at 12:03

## 2019-03-04 RX ADMIN — LIDOCAINE HYDROCHLORIDE 100 MG: 20 INJECTION, SOLUTION INTRAVENOUS at 02:03

## 2019-03-04 RX ADMIN — PROPOFOL 50 MG: 10 INJECTION, EMULSION INTRAVENOUS at 02:03

## 2019-03-04 RX ADMIN — PROPOFOL 200 MCG/KG/MIN: 10 INJECTION, EMULSION INTRAVENOUS at 02:03

## 2019-03-04 RX ADMIN — PROPOFOL 20 MG: 10 INJECTION, EMULSION INTRAVENOUS at 02:03

## 2019-03-04 NOTE — ANESTHESIA PREPROCEDURE EVALUATION
2019  Elana Gonzales is a 43 y.o., female.  Past Medical History:   Diagnosis Date    Anxiety     Dizziness     Interstitial cystitis     Migraine headache      Past Surgical History:   Procedure Laterality Date     SECTION, CLASSIC      COLONOSCOPY N/A 2016    [facility]    CYSTOSCOPY WITH HYDRODISTENSION N/A 2017    Performed by [facility] MD at [facility] OR 1ST FLR    CYSTOSCOPY,WITH BLADDER HYDRODISTENSION N/A 2018    Performed by [facility]ong [facility]guiano MD at [facility] OR [facility]    HYSTERECTOMY      INJECTION, BOTULINUM TOXIN, TYPE A 100 UNITS N/A 2018    [facility]    INJECTION-BOTOX 100 UNITS N/A 2017    Performed by Yong Anguiano MD at [facility] OR 1ST FLR    INSTILLATION, URINARY BLADDER N/A 2018    Per[facility] at [facility]    INSTILLATION-BLADDER N/A 2017    Performed by Yong Anguiano MD at [facility] OR 1ST FLR    OOPHORECTOMY      REDUCTION-MAMMOPLASTY-BILATERAL Bilateral 2017    Performed by [facility] at [facility] OR    TONSILLECTOMY             Anesthesia Evaluation    I have reviewed the Patient Summary Reports.    I have reviewed the Nursing Notes.   I have reviewed the Medications.     Review of Systems  Anesthesia Hx:  No problems with previous Anesthesia   Denies Personal Hx of Anesthesia complications.   Hematology/Oncology:  Hematology Normal   Oncology Normal     EENT/Dental:EENT/Dental Normal   Cardiovascular:  Cardiovascular Normal     Pulmonary:  Pulmonary Normal    Renal/:  Renal/ Normal     Hepatic/GI:  Hepatic/GI Normal    Musculoskeletal:  Musculoskeletal Normal    Neurological:  Neurology Normal    Endocrine:  Endocrine Normal    Dermatological:  Skin Normal    Psych:  Psychiatric Normal           Physical  Exam  General:  Well nourished    Airway/Jaw/Neck:  Airway Findings: Mouth Opening: Normal Tongue: Normal  General Airway Assessment: Adult  Mallampati: II  TM Distance: Normal, at least 6 cm  Jaw/Neck Findings:  Neck ROM: Normal ROM     Eyes/Ears/Nose:  EYES/EARS/NOSE FINDINGS: Normal   Dental:  Dental Findings: In tact   Chest/Lungs:  Chest/Lungs Clear    Heart/Vascular:  Heart Findings: Normal Heart murmur: negative    Abdomen:  Abdomen Findings: Normal      Mental Status:  Mental Status Findings: Normal        Anesthesia Plan  Type of Anesthesia, risks & benefits discussed:  Anesthesia Type:  general  Patient's Preference:   Intra-op Monitoring Plan: standard ASA monitors  Intra-op Monitoring Plan Comments:   Post Op Pain Control Plan: IV/PO Opioids PRN  Post Op Pain Control Plan Comments:   Induction:   IV  Beta Blocker:  Patient is not currently on a Beta-Blocker (No further documentation required).       Informed Consent: Patient understands risks and agrees with Anesthesia plan.  Questions answered. Anesthesia consent signed with patient.  ASA Score: 1     Day of Surgery Review of History & Physical: I have interviewed and examined the patient. I have reviewed the patient's H&P dated:    H&P update referred to the provider.         Ready For Surgery From Anesthesia Perspective.

## 2019-03-04 NOTE — ANESTHESIA POSTPROCEDURE EVALUATION
"Anesthesia Post Evaluation    Patient: Elana Gonzales    Procedure(s) Performed: Procedure(s) (LRB):  ESOPHAGOGASTRODUODENOSCOPY (EGD) (N/A)    Final Anesthesia Type: general  Patient location during evaluation: PACU  Patient participation: Yes- Able to Participate  Level of consciousness: awake and alert  Post-procedure vital signs: reviewed and stable  Pain management: adequate  Airway patency: patent  PONV status at discharge: No PONV  Anesthetic complications: no      Cardiovascular status: blood pressure returned to baseline  Respiratory status: unassisted, spontaneous ventilation and room air  Hydration status: euvolemic  Follow-up not needed.        Visit Vitals  /61   Pulse 89   Temp 36.7 °C (98.1 °F)   Resp 16   Ht 5' 3" (1.6 m)   Wt 88.5 kg (195 lb)   LMP 08/26/2008   SpO2 96%   Breastfeeding? No   BMI 34.54 kg/m²       Pain/Reyes Score: Reyes Score: 10 (3/4/2019  2:55 PM)        "

## 2019-03-04 NOTE — TRANSFER OF CARE
"Anesthesia Transfer of Care Note    Patient: Elana Gonzales    Procedure(s) Performed: Procedure(s) (LRB):  ESOPHAGOGASTRODUODENOSCOPY (EGD) (N/A)    Patient location: PACU    Anesthesia Type: general    Transport from OR: Transported from OR on room air with adequate spontaneous ventilation    Post pain: adequate analgesia    Post assessment: no apparent anesthetic complications and tolerated procedure well    Post vital signs: stable    Level of consciousness: awake, alert and oriented    Nausea/Vomiting: no nausea/vomiting    Complications: none    Transfer of care protocol was followed      Last vitals:   Visit Vitals  /79 (BP Location: Left arm, Patient Position: Lying)   Pulse 72   Temp 36.5 °C (97.7 °F) (Temporal)   Resp 16   Ht 5' 3" (1.6 m)   Wt 88.5 kg (195 lb)   LMP 08/26/2008   SpO2 98%   Breastfeeding? No   BMI 34.54 kg/m²     "

## 2019-03-04 NOTE — [employer]ATION PATIENT INSTRUCTIONS
Discharge Summary/Instructions after an Endoscopic Procedure  Patient Name: Elana Gonzales  Patient MRN: [id number]  Patient YOB: 1975  Monday, March 04, 2019  Luis Main MD  RESTRICTIONS:  During your procedure today, you received medications for sedation.  These   medications may affect your judgment, balance and coordination.  Therefore,   for 24 hours, you have the following restrictions:   - DO NOT drive a car, operate machinery, make legal/financial decisions,   sign important papers or drink alcohol.    ACTIVITY:  Today: no heavy lifting, straining or running due to procedural   sedation/anesthesia.  The following day: return to full activity including work.  DIET:  Eat and drink normally unless instructed otherwise.     TREATMENT FOR COMMON SIDE EFFECTS:  - Mild abdominal pain, nausea, belching, bloating or excessive gas:  rest,   eat lightly and use a heating pad.  - Sore Throat: treat with throat lozenges and/or gargle with warm salt   water.  - Because air was used during the procedure, expelling large amounts of air   from your rectum or belching is normal.  - If a bowel prep was taken, you may not have a bowel movement for 1-3 days.    This is normal.  SYMPTOMS TO WATCH FOR AND REPORT TO YOUR PHYSICIAN:  1. Abdominal pain or bloating, other than gas cramps.  2. Chest pain.  3. Back pain.  4. Signs of infection such as: chills or fever occurring within 24 hours   after the procedure.  5. Rectal bleeding, which would show as bright red, maroon, or black stools.   (A tablespoon of blood from the rectum is not serious, especially if   hemorrhoids are present.)  6. Vomiting.  7. Weakness or dizziness.  GO DIRECTLY TO THE NEAREST EMERGENCY ROOM IF YOU HAVE ANY OF THE FOLLOWING:      Difficulty breathing              Chills and/or fever over 101 F   Persistent vomiting and/or vomiting blood   Severe abdominal pain   Severe chest pain   Black, tarry stools   Bleeding- more than one tablespoon   Any  other symptom or condition that you feel may need urgent attention  Your doctor recommends these additional instructions:  If any biopsies were taken, your doctors clinic will contact you in 1 to 2   weeks with any results.  - Patient has a contact number available for emergencies.  The signs and   symptoms of potential delayed complications were discussed with the   patient.  Return to normal activities tomorrow.  Written discharge   instructions were provided to the patient.   - Discharge patient to home.   - Await pathology results.   - The findings and recommendations were discussed with the designated   responsible adult.  For questions, problems or results please call your physician - Luis Main MD at Work:  [phone].  [facility], EMERGENCY ROOM PHONE NUMBER: [phone]  IF A COMPLICATION OR EMERGENCY SITUATION ARISES AND YOU ARE UNABLE TO REACH   YOUR PHYSICIAN - GO DIRECTLY TO THE EMERGENCY ROOM.  Luis Main MD  3/4/2019 2:45:45 PM  This report has been verified and signed electronically.  [employer]

## 2019-03-04 NOTE — DISCHARGE INSTRUCTIONS

## 2019-03-04 NOTE — H&P
Short Stay Endoscopy History and Physical    PCP - Nick Reeves Jr, MD     Procedure - EGD  ASA - per anesthesia  Mallampati - per anesthesia  History of Anesthesia problems - no  Family history Anesthesia problems -  no   Plan of anesthesia - General    HPI:  This is a 43 y.o. female here for evaluation of :   Epigastric abd pain.      Reflux - no  Dysphagia - no  Abdominal pain - yes  Diarrhea - no    ROS:  Constitutional: No fevers, chills, No weight loss  CV: No chest pain  Pulm: No cough, No shortness of breath  Ophtho: No vision changes  GI: see HPI  Derm: No rash    Medical History:  has a past medical history of Anxiety, Dizziness, Interstitial cystitis, and Migraine headache.    Surgical History:  has a past surgical history that includes Hysterectomy;  section, classic; Oophorectomy; Colonoscopy (N/A, 2016); Tonsillectomy; Injection of botulinum toxin type A (N/A, 2018); and Instillation of urinary bladder (N/A, 2018).    Family History: family history is not on file.. Otherwise no colon cancer, inflammatory bowel disease, or GI malignancies.    Social History:  reports that  has never smoked. she has never used smokeless tobacco. She reports that she does not drink alcohol or use drugs.    Review of patient's allergies indicates:   Allergen Reactions    Bactrim [sulfamethoxazole-trimethoprim] Other (See Comments)     headache    Codeine Other (See Comments)     Other reaction(s): Vomiting       Medications:   Medications Prior to Admission   Medication Sig Dispense Refill Last Dose    butalbital-acetaminophen-caffeine -40 mg (FIORICET, ESGIC) -40 mg per tablet Take 1-2 tablets by mouth every 6 (six) hours as needed for Headaches. 50 tablet 1 3/4/2019 at Unknown time    FLOMAX 0.4 mg Cap Take 1 capsule (0.4 mg total) by mouth once daily. 90 capsule 3 3/3/2019 at Unknown time    hydrOXYzine HCl (ATARAX) 25 MG tablet Take 1 tablet (25 mg total) by mouth every  evening. 30 tablet 11 3/3/2019 at Unknown time    oxybutynin (DITROPAN-XL) 10 MG 24 hr tablet Take 1 tablet (10 mg total) by mouth every evening. 30 tablet 11 3/3/2019 at Unknown time    oxyCODONE-acetaminophen (PERCOCET)  mg per tablet Take 1 tablet by mouth every 12 (twelve) hours as needed.  0 3/3/2019 at Unknown time    phenazopyridine (PYRIDIUM) 200 MG tablet Take 1 tablet (200 mg total) by mouth 3 (three) times daily as needed for Pain. 30 tablet 10 3/3/2019 at Unknown time    diclofenac (CATAFLAM) 50 MG tablet TAKE 1 TABLET BY MOUTH TWO TIMES DAILY AS NEEDED FOR PAIN  0 Unknown at Unknown time    dicyclomine (BENTYL) 20 mg tablet Take 1 tablet (20 mg total) by mouth before meals as needed (tid prn). 90 tablet 3 Unknown at Unknown time       Physical Exam:    Vital Signs:   Vitals:    03/04/19 1302   BP: 121/79   Pulse: 72   Resp: 16   Temp: 97.7 °F (36.5 °C)       General Appearance: Well appearing in no acute distress  Eyes:    No scleral icterus  ENT: Neck supple, Lips, mucosa, and tongue normal  Lungs: nonlabored respirations.  Heart:  Regular rate  Abdomen: Soft, non tender, non distended . No hepatosplenomegaly, ascites, or mass.  Extremities: No edema  Skin: No rash    Labs:  Lab Results   Component Value Date    WBC 9.03 03/04/2019    HGB 12.9 03/04/2019    HCT 39.5 03/04/2019     03/04/2019    CHOL 211 (H) 08/03/2007    TRIG 72 08/03/2007    HDL 94 (H) 08/03/2007    ALT 27 03/04/2019    AST 24 03/04/2019     03/04/2019    K 3.6 03/04/2019     03/04/2019    CREATININE 0.8 03/04/2019    BUN 11 03/04/2019    CO2 27 03/04/2019    TSH 1.5 08/03/2007    INR 0.9 09/08/2016       I have explained the risks and benefits of endoscopy procedures to the patient including but not limited to bleeding, perforation, infection, and death.      Paxton Sauceda MD

## 2019-03-11 ENCOUNTER — TELEPHONE (OUTPATIENT)
Dept: ENDOSCOPY | Facility: HOSPITAL | Age: 44
End: 2019-03-11

## 2019-03-12 ENCOUNTER — TELEPHONE (OUTPATIENT)
Dept: UROLOGY | Facility: CLINIC | Age: 44
End: 2019-03-12

## 2019-03-12 DIAGNOSIS — N32.81 OAB (OVERACTIVE BLADDER): ICD-10-CM

## 2019-03-12 RX ORDER — OXYBUTYNIN CHLORIDE 10 MG/1
TABLET, EXTENDED RELEASE ORAL
Qty: 30 TABLET | Refills: 11 | Status: SHIPPED | OUTPATIENT
Start: 2019-03-12 | End: 2019-08-16 | Stop reason: SDUPTHER

## 2019-03-13 ENCOUNTER — ANESTHESIA (OUTPATIENT)
Dept: SURGERY | Facility: HOSPITAL | Age: 44
End: 2019-03-13
Payer: COMMERCIAL

## 2019-03-13 ENCOUNTER — HOSPITAL ENCOUNTER (OUTPATIENT)
Facility: HOSPITAL | Age: 44
Discharge: HOME | End: 2019-03-13
Attending: UROLOGY | Admitting: UROLOGY
Payer: COMMERCIAL

## 2019-03-13 VITALS
BODY MASS INDEX: 34.55 KG/M2 | HEART RATE: 76 BPM | DIASTOLIC BLOOD PRESSURE: 89 MMHG | OXYGEN SATURATION: 99 % | TEMPERATURE: 98 F | WEIGHT: 195 LBS | SYSTOLIC BLOOD PRESSURE: 131 MMHG | HEIGHT: 63 IN | RESPIRATION RATE: 16 BRPM

## 2019-03-13 DIAGNOSIS — N30.10 INTERSTITIAL CYSTITIS: ICD-10-CM

## 2019-03-13 PROCEDURE — 52260 PR CYSTOSCOPY,DIL BLADDER,GEN ANESTH: ICD-10-PCS | Mod: ,,, | Performed by: UROLOGY

## 2019-03-13 PROCEDURE — 71000033 HC RECOVERY, INTIAL HOUR: Performed by: UROLOGY

## 2019-03-13 PROCEDURE — D9220A PRA ANESTHESIA: Mod: CRNA,,, | Performed by: NURSE ANESTHETIST, CERTIFIED REGISTERED

## 2019-03-13 PROCEDURE — 71000016 HC POSTOP RECOV ADDL HR: Performed by: UROLOGY

## 2019-03-13 PROCEDURE — 63600175 PHARM REV CODE 636 W HCPCS: Performed by: NURSE ANESTHETIST, CERTIFIED REGISTERED

## 2019-03-13 PROCEDURE — 63600175 PHARM REV CODE 636 W HCPCS: Performed by: STUDENT IN AN ORGANIZED HEALTH CARE EDUCATION/TRAINING PROGRAM

## 2019-03-13 PROCEDURE — 52287 CYSTOSCOPY CHEMODENERVATION: CPT | Mod: 51,,, | Performed by: UROLOGY

## 2019-03-13 PROCEDURE — D9220A PRA ANESTHESIA: ICD-10-PCS | Mod: ANES,,, | Performed by: ANESTHESIOLOGY

## 2019-03-13 PROCEDURE — 52260 CYSTOSCOPY AND TREATMENT: CPT | Mod: ,,, | Performed by: UROLOGY

## 2019-03-13 PROCEDURE — 36000707: Performed by: UROLOGY

## 2019-03-13 PROCEDURE — 25000003 PHARM REV CODE 250: Performed by: STUDENT IN AN ORGANIZED HEALTH CARE EDUCATION/TRAINING PROGRAM

## 2019-03-13 PROCEDURE — 37000009 HC ANESTHESIA EA ADD 15 MINS: Performed by: UROLOGY

## 2019-03-13 PROCEDURE — 63600175 PHARM REV CODE 636 W HCPCS: Mod: JG | Performed by: UROLOGY

## 2019-03-13 PROCEDURE — 37000008 HC ANESTHESIA 1ST 15 MINUTES: Performed by: UROLOGY

## 2019-03-13 PROCEDURE — 25000003 PHARM REV CODE 250: Performed by: UROLOGY

## 2019-03-13 PROCEDURE — 00910 ANES TRANSURETHRAL PX NOS: CPT | Performed by: UROLOGY

## 2019-03-13 PROCEDURE — 36000706: Performed by: UROLOGY

## 2019-03-13 PROCEDURE — 71000015 HC POSTOP RECOV 1ST HR: Performed by: UROLOGY

## 2019-03-13 PROCEDURE — 52287 PR CYSTOURETHROSCOPY WITH INJ FOR CHEMODENERVATION: ICD-10-PCS | Mod: 51,,, | Performed by: UROLOGY

## 2019-03-13 PROCEDURE — D9220A PRA ANESTHESIA: Mod: ANES,,, | Performed by: ANESTHESIOLOGY

## 2019-03-13 PROCEDURE — 94761 N-INVAS EAR/PLS OXIMETRY MLT: CPT

## 2019-03-13 PROCEDURE — 27000221 HC OXYGEN, UP TO 24 HOURS

## 2019-03-13 PROCEDURE — 25000003 PHARM REV CODE 250: Performed by: NURSE ANESTHETIST, CERTIFIED REGISTERED

## 2019-03-13 PROCEDURE — D9220A PRA ANESTHESIA: ICD-10-PCS | Mod: CRNA,,, | Performed by: NURSE ANESTHETIST, CERTIFIED REGISTERED

## 2019-03-13 RX ORDER — LIDOCAINE HYDROCHLORIDE 10 MG/ML
1 INJECTION, SOLUTION EPIDURAL; INFILTRATION; INTRACAUDAL; PERINEURAL ONCE
Status: COMPLETED | OUTPATIENT
Start: 2019-03-13 | End: 2019-03-13

## 2019-03-13 RX ORDER — FENTANYL CITRATE 50 UG/ML
INJECTION, SOLUTION INTRAMUSCULAR; INTRAVENOUS
Status: DISCONTINUED | OUTPATIENT
Start: 2019-03-13 | End: 2019-03-13

## 2019-03-13 RX ORDER — FENTANYL CITRATE 50 UG/ML
25 INJECTION, SOLUTION INTRAMUSCULAR; INTRAVENOUS EVERY 5 MIN PRN
Status: DISCONTINUED | OUTPATIENT
Start: 2019-03-13 | End: 2019-03-13 | Stop reason: HOSPADM

## 2019-03-13 RX ORDER — PROPOFOL 10 MG/ML
VIAL (ML) INTRAVENOUS
Status: DISCONTINUED | OUTPATIENT
Start: 2019-03-13 | End: 2019-03-13

## 2019-03-13 RX ORDER — TRAMADOL HYDROCHLORIDE 50 MG/1
50 TABLET ORAL EVERY 6 HOURS PRN
Qty: 5 TABLET | Refills: 0 | Status: SHIPPED | OUTPATIENT
Start: 2019-03-13 | End: 2019-03-13 | Stop reason: SDUPTHER

## 2019-03-13 RX ORDER — SODIUM CHLORIDE 9 MG/ML
INJECTION, SOLUTION INTRAVENOUS CONTINUOUS
Status: DISCONTINUED | OUTPATIENT
Start: 2019-03-13 | End: 2019-03-13 | Stop reason: HOSPADM

## 2019-03-13 RX ORDER — HEPARIN SODIUM 1000 [USP'U]/ML
INJECTION, SOLUTION INTRAVENOUS; SUBCUTANEOUS
Status: DISCONTINUED | OUTPATIENT
Start: 2019-03-13 | End: 2019-03-13 | Stop reason: HOSPADM

## 2019-03-13 RX ORDER — ONDANSETRON 8 MG/1
8 TABLET, ORALLY DISINTEGRATING ORAL EVERY 8 HOURS PRN
Status: DISCONTINUED | OUTPATIENT
Start: 2019-03-13 | End: 2019-03-13 | Stop reason: HOSPADM

## 2019-03-13 RX ORDER — LIDOCAINE HCL/PF 100 MG/5ML
SYRINGE (ML) INTRAVENOUS
Status: DISCONTINUED | OUTPATIENT
Start: 2019-03-13 | End: 2019-03-13

## 2019-03-13 RX ORDER — DEXAMETHASONE SODIUM PHOSPHATE 4 MG/ML
INJECTION, SOLUTION INTRA-ARTICULAR; INTRALESIONAL; INTRAMUSCULAR; INTRAVENOUS; SOFT TISSUE
Status: DISCONTINUED | OUTPATIENT
Start: 2019-03-13 | End: 2019-03-13

## 2019-03-13 RX ORDER — SODIUM CHLORIDE 9 MG/ML
INJECTION, SOLUTION INTRAVENOUS CONTINUOUS PRN
Status: DISCONTINUED | OUTPATIENT
Start: 2019-03-13 | End: 2019-03-13

## 2019-03-13 RX ORDER — TRAMADOL HYDROCHLORIDE 50 MG/1
50 TABLET ORAL EVERY 6 HOURS PRN
Qty: 5 TABLET | Refills: 0 | Status: SHIPPED | OUTPATIENT
Start: 2019-03-13 | End: 2019-03-23

## 2019-03-13 RX ORDER — ONDANSETRON 2 MG/ML
INJECTION INTRAMUSCULAR; INTRAVENOUS
Status: DISCONTINUED | OUTPATIENT
Start: 2019-03-13 | End: 2019-03-13

## 2019-03-13 RX ORDER — CEFAZOLIN SODIUM 1 G/3ML
2 INJECTION, POWDER, FOR SOLUTION INTRAMUSCULAR; INTRAVENOUS
Status: COMPLETED | OUTPATIENT
Start: 2019-03-13 | End: 2019-03-13

## 2019-03-13 RX ORDER — TRAMADOL HYDROCHLORIDE 50 MG/1
50 TABLET ORAL EVERY 4 HOURS PRN
Status: DISCONTINUED | OUTPATIENT
Start: 2019-03-13 | End: 2019-03-13 | Stop reason: HOSPADM

## 2019-03-13 RX ORDER — MIDAZOLAM HYDROCHLORIDE 1 MG/ML
INJECTION, SOLUTION INTRAMUSCULAR; INTRAVENOUS
Status: DISCONTINUED | OUTPATIENT
Start: 2019-03-13 | End: 2019-03-13

## 2019-03-13 RX ORDER — PHENAZOPYRIDINE HYDROCHLORIDE 100 MG/1
100 TABLET, FILM COATED ORAL ONCE
Status: DISCONTINUED | OUTPATIENT
Start: 2019-03-13 | End: 2019-03-13 | Stop reason: HOSPADM

## 2019-03-13 RX ORDER — LIDOCAINE HYDROCHLORIDE 10 MG/ML
INJECTION, SOLUTION EPIDURAL; INFILTRATION; INTRACAUDAL; PERINEURAL
Status: DISCONTINUED | OUTPATIENT
Start: 2019-03-13 | End: 2019-03-13 | Stop reason: HOSPADM

## 2019-03-13 RX ORDER — BUPIVACAINE HYDROCHLORIDE 2.5 MG/ML
INJECTION, SOLUTION EPIDURAL; INFILTRATION; INTRACAUDAL
Status: DISCONTINUED | OUTPATIENT
Start: 2019-03-13 | End: 2019-03-13 | Stop reason: HOSPADM

## 2019-03-13 RX ORDER — INDOMETHACIN 25 MG/1
CAPSULE ORAL
Status: DISCONTINUED | OUTPATIENT
Start: 2019-03-13 | End: 2019-03-13 | Stop reason: HOSPADM

## 2019-03-13 RX ORDER — SODIUM CHLORIDE 0.9 % (FLUSH) 0.9 %
3 SYRINGE (ML) INJECTION
Status: DISCONTINUED | OUTPATIENT
Start: 2019-03-13 | End: 2019-03-13 | Stop reason: HOSPADM

## 2019-03-13 RX ADMIN — CEFAZOLIN 2 G: 330 INJECTION, POWDER, FOR SOLUTION INTRAMUSCULAR; INTRAVENOUS at 10:03

## 2019-03-13 RX ADMIN — DEXAMETHASONE SODIUM PHOSPHATE 4 MG: 4 INJECTION, SOLUTION INTRAMUSCULAR; INTRAVENOUS at 10:03

## 2019-03-13 RX ADMIN — FENTANYL CITRATE 50 MCG: 50 INJECTION, SOLUTION INTRAMUSCULAR; INTRAVENOUS at 11:03

## 2019-03-13 RX ADMIN — ONDANSETRON 4 MG: 2 INJECTION INTRAMUSCULAR; INTRAVENOUS at 11:03

## 2019-03-13 RX ADMIN — MIDAZOLAM HYDROCHLORIDE 2 MG: 1 INJECTION, SOLUTION INTRAMUSCULAR; INTRAVENOUS at 10:03

## 2019-03-13 RX ADMIN — LIDOCAINE HYDROCHLORIDE 100 MG: 20 INJECTION, SOLUTION INTRAVENOUS at 10:03

## 2019-03-13 RX ADMIN — SODIUM CHLORIDE: 0.9 INJECTION, SOLUTION INTRAVENOUS at 10:03

## 2019-03-13 RX ADMIN — LIDOCAINE HYDROCHLORIDE 0.1 MG: 10 INJECTION, SOLUTION EPIDURAL; INFILTRATION; INTRACAUDAL; PERINEURAL at 09:03

## 2019-03-13 RX ADMIN — FENTANYL CITRATE 50 MCG: 50 INJECTION, SOLUTION INTRAMUSCULAR; INTRAVENOUS at 10:03

## 2019-03-13 RX ADMIN — SODIUM CHLORIDE: 0.9 INJECTION, SOLUTION INTRAVENOUS at 09:03

## 2019-03-13 RX ADMIN — PROPOFOL 200 MG: 10 INJECTION, EMULSION INTRAVENOUS at 10:03

## 2019-03-13 NOTE — PROGRESS NOTES
Thakur removed 45 minutes after arrival to PACU. Pt unable to urinate in PACU on the bedpan, so transferred to phase II so pt can walk to bathroom.

## 2019-03-13 NOTE — OP NOTE
[facility]  Operative Note    Date: 03/13/2019    Pre-Op Diagnosis: interstitial cystitis  Patient Active Problem List   Diagnosis    Incomplete bladder emptying    Right flank pain    Migraine without aura, with intractable migraine, so stated, without mention of status migrainosus    Anxiety state    Memory loss    Intractable migraine without aura and without status migrainosus    Abdominal pain, RLQ (right lower quadrant)    History of diverticulitis    Dizzy    Nocturia    Acute UTI    IC (interstitial cystitis)    Bladder pain    GERD (gastroesophageal reflux disease)    Interstitial cystitis         Post-Op Diagnosis: same    Procedure(s) Performed:   1.  Cystoscopy with hydrodistension  2.  botox intravesical injection 100 units    Specimen(s): none    Staff Surgeon: Dr. Ynog Anguiano MD    Assistant Surgeon: Horacio Boles MD, Horacio Thomas MD    Anesthesia: General mask inhalational anesthesia    Indications: Elana Gonzales is a 43 y.o. female with interstitial cystitis presenting for hydrodistension and intravesical botox injection.    Findings:   1.  Normal bladder mucosa with no lesions.  UOs in normal anatomic position  2.  100 U of Botox injected throughout detrusor  3.  hydrodistention with 700 cc and 700 cc capacity with terminal hematuria and glomerulations    Estimated Blood Loss: min    Drains: 16 Fr samuel    Procedure in detail: After risks, benefits and possible complications of hydro distention were discussed with the patient informed consent was obtained. All questions were answered in the pre-operative area.  The patient was transferred to the cystoscopy suite and placed in the supine position.  SCDs were applied and working.  Anesthesia was administered.  After adequate anesthesia the patient was placed in the dorsal lithotomy position and prepped and draped in the usual sterile fashion. Time out was preformed and magaly-procedural antibiotics were confirmed.      A rigid cystoscope in a 22 Fr sheath was introduced into the bladder per urethra. This passed easily.  The entire urethra was visualized which showed no masses or strictures.  The right and left ureteral orifices were identified in the normal anatomic position and were seen effluxing clear urine.  Formal cystoscopy was performed with the 70 degree lenses, which revealed no masses or lesions suspicious for malignancy, no trabeculations, no bladder stones and no bladder diverticuli. The botox needle was inserted and 100 U of botox were injected systematically throughout the detrusor.      The bladder was then filled with sterile water until equilibrium was reached at 70 cm of water. The capacity of the bladder was 700ml. This was repeated and the second bladder capacity was 700ml. There was terminal hematuria seen. There were not ulcerations seen. There were glomerulizations seen.    The bladder was drained and the cystoscope removed.  A 16 Fr samuel catheter was placed.  A concoction of 40 000 U heparin, 20 mL 1 % lidocaine, 30 mL 0.5% bupivicaine, 10 mL of 8.4% NaHCO3 was instilled into the bladder with instructions to keep the instillation in the bladder for at least 30 minutes postoperatively.      The patient was removed from lithotomy and transferred to recovery in stable condition.    Disposition:  The patient will follow up with Dr. Anguiano in 6 months.  Prescriptions were given for tramadol.     Horacio Boles MD

## 2019-03-13 NOTE — PLAN OF CARE
Discharge instructions given and explained to patient and family by Carie MAS with verbalization of understanding all instructions. Prescription given and explained next time and doses of each medication. Patients v/s stable, denies n/v and tolerating po, rates pain level tolerable, IV removed, and family at bedside for patient discharge home.

## 2019-03-13 NOTE — DISCHARGE SUMMARY
[facility]  Discharge Note  Short Stay    Admit Date: 3/13/2019    Discharge Date and Time: 03/13/2019 11:37 AM      Attending Physician: Yong Anguiano MD     Discharge Provider: Horacio Boles    Diagnoses:  Active Hospital Problems    Diagnosis  POA    Interstitial cystitis [N30.10]  Yes      Resolved Hospital Problems   No resolved problems to display.       Discharged Condition: good    Hospital Course: Patient was admitted for cystoscopy, hydrodistension, bladder botox injections and tolerated the procedure well with no complications. The patient was discharged home in good condition on the same day.       Final Diagnoses: Same as principal problem.    Disposition: Home or Self Care    Follow up/Patient Instructions:    Medications:  Reconciled Home Medications:   Current Discharge Medication List      START taking these medications    Details   traMADol (ULTRAM) 50 mg tablet Take 1 tablet (50 mg total) by mouth every 6 (six) hours as needed for Pain.  Qty: 5 tablet, Refills: 0         CONTINUE these medications which have NOT CHANGED    Details   butalbital-acetaminophen-caffeine -40 mg (FIORICET, ESGIC) -40 mg per tablet Take 1-2 tablets by mouth every 6 (six) hours as needed for Headaches.  Qty: 50 tablet, Refills: 1    Associated Diagnoses: Intractable migraine without aura and without status migrainosus      FLOMAX 0.4 mg Cap Take 1 capsule (0.4 mg total) by mouth once daily.  Qty: 90 capsule, Refills: 3    Associated Diagnoses: Incomplete bladder emptying      hydrOXYzine HCl (ATARAX) 25 MG tablet Take 1 tablet (25 mg total) by mouth every evening.  Qty: 30 tablet, Refills: 11    Comments: Generic For:ATARAX 25MG  Associated Diagnoses: Interstitial cystitis      oxybutynin (DITROPAN-XL) 10 MG 24 hr tablet TAKE 1 TABLET BY MOUTH EVERY EVENING  Qty: 30 tablet, Refills: 11    Comments: Generic For:DITROPAN XL 10MG  Associated Diagnoses: OAB (overactive bladder)       oxyCODONE-acetaminophen (PERCOCET)  mg per tablet Take 1 tablet by mouth every 12 (twelve) hours as needed.  Refills: 0      diclofenac (CATAFLAM) 50 MG tablet TAKE 1 TABLET BY MOUTH TWO TIMES DAILY AS NEEDED FOR PAIN  Refills: 0      dicyclomine (BENTYL) 20 mg tablet Take 1 tablet (20 mg total) by mouth before meals as needed (tid prn).  Qty: 90 tablet, Refills: 3           Discharge Procedure Orders   Diet general     Call MD for:  temperature >100.4     Call MD for:  persistent nausea and vomiting     Call MD for:  severe uncontrolled pain     Call MD for:  difficulty breathing, headache or visual disturbances     Call MD for:  redness, tenderness, or signs of infection (pain, swelling, redness, odor or green/yellow discharge around incision site)     Call MD for:  hives     Call MD for:  persistent dizziness or light-headedness     Call MD for:  extreme fatigue     Activity as tolerated     Follow-up Information     Yong Anguiano MD In 6 months.    Specialty:  Urology  Why:  follow up  Contact information:  [facility] [address]  [facility] [address] [phone]

## 2019-03-13 NOTE — TRANSFER OF CARE
"Anesthesia Transfer of Care Note    Patient: Elana Gonzales    Procedure(s) Performed: Procedure(s) (LRB):  CYSTOSCOPY, WITH BLADDER HYDRODISTENSION (N/A)  INSTILLATION, BLADDER (N/A)  INJECTION, BOTULINUM TOXIN, TYPE A 100 UNITS (N/A)    Patient location: PACU    Anesthesia Type: general    Transport from OR: Transported from OR on 6-10 L/min O2 by face mask with adequate spontaneous ventilation    Post pain: adequate analgesia    Post assessment: tolerated procedure well and no apparent anesthetic complications    Post vital signs: stable    Level of consciousness: awake, alert and oriented    Nausea/Vomiting: no nausea/vomiting    Complications: none    Transfer of care protocol was followed      Last vitals:   Visit Vitals  /81   Pulse 84   Temp 36.4 °C (97.5 °F) (Temporal)   Resp 18   Ht 5' 3" (1.6 m)   Wt 88.5 kg (195 lb)   LMP 08/26/2008   SpO2 100%   Breastfeeding? No   BMI 34.54 kg/m²     "

## 2019-03-13 NOTE — PROGRESS NOTES
Frequent VS not performed because patient up in bathroom.  Patient attempting to void, but she says it burns too much currently.

## 2019-03-13 NOTE — DISCHARGE INSTRUCTIONS
Cystoscopy    Cystoscopy is a procedure that lets your doctor look directly inside your urethra and bladder. It can be used to:  · Help diagnose a problem with your urethra, bladder, or kidneys.  · Take a sample (biopsy) of bladder or urethral tissue.  · Treat certain problems (such as removing kidney stones).  · Place a stent to bypass an obstruction.  · Take special X-rays of the kidneys.  Based on the findings, your doctor may recommend other tests or treatments.  What is a cystoscope?  A cystoscope is a telescope-like instrument that contains lenses and fiberoptics (small glass wires that make bright light). The cystoscope may be straight and rigid, or flexible to bend around curves in the urethra. The doctor may look directly into the cystoscope, or project the image onto a monitor.  Getting ready  · Ask your doctor if you should stop taking any medicines before the procedure.  · Ask whether you should avoid eating or drinking anything after midnight before the procedure.  · Follow any other instructions your doctor gives you.  Tell your doctor before the exam if you:  · Take any medicines, such as aspirin or blood thinners  · Have allergies to any medicines  · Are pregnant   The procedure  Cystoscopy is done in the doctors office, surgery center, or hospital. The doctor and a nurse are present during the procedure. It takes only a few minutes, longer if a biopsy, X-ray, or treatment needs to be done.  During the procedure:  · You lie on an exam table on your back, knees bent and legs apart. You are covered with a drape.  · Your urethra and the area around it are washed. Anesthetic jelly may be applied to numb the urethra. Other pain medicine is usually not needed. In some cases, you may be offered a mild sedative to help you relax. If a more extensive procedure is to be done, such as a biopsy or kidney stone removal, general anesthesia may be needed.  · The cystoscope is inserted. A sterile fluid is put  into the bladder to expand it. You may feel pressure from this fluid.  · When the procedure is done, the cystoscope is removed.  After the procedure  If you had a sedative, general anesthesia, or spinal anesthesia, you must have someone drive you home. Once youre home:  · Drink plenty of fluids.  · You may have burning or light bleeding when you urinate--this is normal.  · Medicines may be prescribed to ease any discomfort or prevent infection. Take these as directed.  · Call your doctor if you have heavy bleeding or blood clots, burning that lasts more than a day, a fever over 100°F  (38° C), or trouble urinating.  Date Last Reviewed: 1/1/2017 © 2000-2017 [employer]. [address] [place] [address], [place] [address]. All rights reserved. This information is not intended as a substitute for professional medical care. Always follow your healthcare professional's instructions.      PATIENT INSTRUCTIONS  POST-ANESTHESIA    IMMEDIATELY FOLLOWING SURGERY:  Do not drive or operate machinery for the first twenty four hours after surgery.  Do not make any important decisions for twenty four hours after surgery or while taking narcotic pain medications or sedatives.  If you develop intractable nausea and vomiting or a severe headache please notify your doctor immediately.    FOLLOW-UP:  Please make an appointment with your surgeon as instructed. You do not need to follow up with anesthesia unless specifically instructed to do so.    WOUND CARE INSTRUCTIONS (if applicable):  Keep a dry clean dressing on the anesthesia/puncture wound site if there is drainage.  Once the wound has quit draining you may leave it open to air.  Generally you should leave the bandage intact for twenty four hours unless there is drainage.  If the epidural site drains for more than 36-48 hours please call the anesthesia department.    QUESTIONS?:  Please feel free to call your physician or the hospital  if you have any questions, and  they will be happy to assist you.       [facility] Anesthesia Department  [address], [place]  [phone]

## 2019-03-13 NOTE — H&P
Urology ([facility]) H&P  Staff: Yong Anguiano MD    HPI:  Elana Gonzales is a 43 y.o. female with PMH of IC who presents for possible IC flare up.  C/o bladder pain 8 to 9 out of 10 in pain scale for the last few weeks.  Pyridium helps a bit.   Her frequency urgency got better after previous botox injection.  So far OAB symptoms are not bothersome to her.  She is under stress especially due to her kids.     s/p cysto hydrodistention and botox injection on 17    Plan for repeat cysto/ hydrodistension and bladder botox injections today    ROS:  Neg except per HPI    Past Medical History:   Diagnosis Date    Anxiety     Dizziness     Interstitial cystitis     Migraine headache        Past Surgical History:   Procedure Laterality Date    BREAST SURGERY           SECTION, CLASSIC      COLONOSCOPY N/A 2016    [facility] ENDO    CYSTOSCOPY WITH HYDRODISTENSION N/A 2017    [facility] at [facility] OR 1ST FLR    CYSTOSCOPY,WITH BLADDER HYDRODISTENSION N/A 2018    Performed by [facility] at [facility] OR 1ST FLR    ESOPHAGOGASTRODUODENOSCOPY (EGD) N/A 3/4/2019    Performed by Luis Main MD at [facility] ENDO (4TH FLR)    HYSTERECTOMY      INJECTION, BOTULINUM TOXIN, TYPE A 100 UNITS N/A 2018    Performed by [facility] at [facility] OR 1ST FLR    INJECTION-BOTOX 100 UNITS N/A 2017    Performed by [facility] at [facility] OR 1ST FLR    INSTILLATION, URINARY BLADDER N/A 2018    Performed by [facility] at [facility] OR 1ST FLR    INSTILLATION-BLADDER N/A 2017    Performed by [facility] at [facility] OR 1ST FLR    OOPHORECTOMY      REDUCTION-MAMMOPLASTY-BILATERAL Bilateral 2017    [facility] OR    TONSILLECTOMY         Social History     Socioeconomic History    Marital status:      Spouse name: None    Number of children: None    Years of education: None    Highest  education level: None   Social Needs    Financial resource strain: None    Food insecurity - worry: None    Food insecurity - inability: None    Transportation needs - medical: None    Transportation needs - non-medical: None   Occupational History    None   Tobacco Use    Smoking status: Never Smoker    Smokeless tobacco: Never Used   Substance and Sexual Activity    Alcohol use: No     Alcohol/week: 0.0 oz    Drug use: No    Sexual activity: Yes     Partners: Male   Other Topics Concern    None   Social History Narrative    None       Family History   Problem Relation Age of Onset    Colon cancer Neg Hx     Esophageal cancer Neg Hx     Inflammatory bowel disease Neg Hx     Celiac disease Neg Hx        Review of patient's allergies indicates:   Allergen Reactions    Bactrim [sulfamethoxazole-trimethoprim] Other (See Comments)     headache    Codeine Other (See Comments)     Other reaction(s): Vomiting       No current facility-administered medications on file prior to encounter.      Current Outpatient Medications on File Prior to Encounter   Medication Sig Dispense Refill    butalbital-acetaminophen-caffeine -40 mg (FIORICET, ESGIC) -40 mg per tablet Take 1-2 tablets by mouth every 6 (six) hours as needed for Headaches. 50 tablet 1    FLOMAX 0.4 mg Cap Take 1 capsule (0.4 mg total) by mouth once daily. 90 capsule 3    hydrOXYzine HCl (ATARAX) 25 MG tablet Take 1 tablet (25 mg total) by mouth every evening. 30 tablet 11    oxyCODONE-acetaminophen (PERCOCET)  mg per tablet Take 1 tablet by mouth every 12 (twelve) hours as needed.  0    diclofenac (CATAFLAM) 50 MG tablet TAKE 1 TABLET BY MOUTH TWO TIMES DAILY AS NEEDED FOR PAIN  0    dicyclomine (BENTYL) 20 mg tablet Take 1 tablet (20 mg total) by mouth before meals as needed (tid prn). 90 tablet 3       Anticoagulation:  No    Physical Exam:  General: No acute distress, well developed. AAOx3  Head: Normocephalic,  Atraumatic  Eyes: Extra-occular movements intact, No discharge  Neck: supple, symmetrical, trachea midline  Lungs: normal respiratory effort, no respiratory distress, no wheezes  CV: regular rate, 2+ pulses  Abdomen: soft, non-tender, non-distended, no organomegaly  MSK: no edema, no deformities, normal ROM  Skin: skin color, texture, turgor normal.  Neurologic: no focal deficits, sensation intact    Labs:    Urine dipstick today negative for all components    Lab Results   Component Value Date    WBC 9.03 03/04/2019    HGB 12.9 03/04/2019    HCT 39.5 03/04/2019    MCV 93 03/04/2019     03/04/2019       BMP  Lab Results   Component Value Date     03/04/2019    K 3.6 03/04/2019     03/04/2019    CO2 27 03/04/2019    BUN 11 03/04/2019    CREATININE 0.8 03/04/2019    CALCIUM 10.3 03/04/2019    ANIONGAP 10 03/04/2019    ESTGFRAFRICA >60.0 03/04/2019    EGFRNONAA >60.0 03/04/2019       No results found for: PSA      Assessment: Elana Gonzales is a 43 y.o. female with interstitial cystitis    Plan:     1. To OR today for cystoscopy, hydrodistension, bladder botox injections  2. Consents signed   3. I have explained the risk, benefits, and alternatives of the procedure in detail. The patient voices understanding and all questions have been answered. The patient agrees to proceed as planned.     Horacio Boles MD

## 2019-03-14 NOTE — ANESTHESIA POSTPROCEDURE EVALUATION
"Anesthesia Post Evaluation    Patient: Elana Gonzales    Procedure(s) Performed: Procedure(s) (LRB):  CYSTOSCOPY, WITH BLADDER HYDRODISTENSION (N/A)  INSTILLATION, BLADDER (N/A)  INJECTION, BOTULINUM TOXIN, TYPE A 100 UNITS (N/A)    Final Anesthesia Type: general  Patient location during evaluation: PACU  Patient participation: Yes- Able to Participate  Level of consciousness: awake and alert  Post-procedure vital signs: reviewed and stable  Pain management: adequate  Airway patency: patent  PONV status at discharge: No PONV  Anesthetic complications: no      Cardiovascular status: hemodynamically stable  Respiratory status: room air, spontaneous ventilation and unassisted  Hydration status: euvolemic  Follow-up not needed.        Visit Vitals  /89   Pulse 76   Temp 36.6 °C (97.9 °F)   Resp 16   Ht 5' 3" (1.6 m)   Wt 88.5 kg (195 lb)   LMP 08/26/2008   SpO2 99%   Breastfeeding? No   BMI 34.54 kg/m²       Pain/Reyes Score: Reyes Score: 10 (3/13/2019  1:35 PM)        "

## 2019-03-15 ENCOUNTER — TELEPHONE (OUTPATIENT)
Dept: GASTROENTEROLOGY | Facility: CLINIC | Age: 44
End: 2019-03-15

## 2019-03-15 RX ORDER — CLARITHROMYCIN 500 MG/1
500 TABLET, FILM COATED ORAL EVERY 12 HOURS
Qty: 28 TABLET | Refills: 0 | Status: SHIPPED | OUTPATIENT
Start: 2019-03-15 | End: 2019-03-29

## 2019-03-15 RX ORDER — AMOXICILLIN 500 MG/1
1000 TABLET, FILM COATED ORAL EVERY 12 HOURS
Qty: 56 TABLET | Refills: 0 | Status: SHIPPED | OUTPATIENT
Start: 2019-03-15 | End: 2019-03-29

## 2019-03-15 RX ORDER — OMEPRAZOLE 20 MG/1
20 CAPSULE, DELAYED RELEASE ORAL 2 TIMES DAILY
Qty: 28 CAPSULE | Refills: 0 | Status: SHIPPED | OUTPATIENT
Start: 2019-03-15 | End: 2019-04-25

## 2019-03-15 NOTE — TELEPHONE ENCOUNTER
Ma spoke to pt informed pt per Dr. Main message below:    Inform patient H pylori was positive from biopsies from stomach   Letter sent with info   Antibiotics sent to pharmacy   Schedule 3 month clinic followup with NP/PA     Pt verbalize understanding, request appt letter to mailed to her prefer morning appt and thank Ma

## 2019-04-11 DIAGNOSIS — N30.10 INTERSTITIAL CYSTITIS: ICD-10-CM

## 2019-04-11 RX ORDER — HYDROXYZINE HYDROCHLORIDE 25 MG/1
TABLET, FILM COATED ORAL
Qty: 30 TABLET | Refills: 11 | Status: SHIPPED | OUTPATIENT
Start: 2019-04-11 | End: 2019-08-16 | Stop reason: SDUPTHER

## 2019-04-12 ENCOUNTER — TELEPHONE (OUTPATIENT)
Dept: GASTROENTEROLOGY | Facility: CLINIC | Age: 44
End: 2019-04-12

## 2019-04-12 NOTE — TELEPHONE ENCOUNTER
----- Message from Angela Ambrose sent at 4/12/2019  2:31 PM CDT -----  Contact: Self- [phone]  Jose David- pt called to speak with [place]- pt states she's still having the burning and discomfort in the abdomen- would like to discuss- please contact pt at [phone]

## 2019-04-12 NOTE — TELEPHONE ENCOUNTER
Ma spoke to pt,    Pt is reporting finish both antibiotic Amoxil and Biaxin 500 mg.     Pt stated she started taking the Prilosec on yesterday.    Pt stated there has been no change with her symptoms. Pt will like for Dr. Main to advise

## 2019-04-25 ENCOUNTER — OFFICE VISIT (OUTPATIENT)
Dept: GASTROENTEROLOGY | Facility: CLINIC | Age: 44
End: 2019-04-25
Payer: COMMERCIAL

## 2019-04-25 VITALS
HEIGHT: 62 IN | SYSTOLIC BLOOD PRESSURE: 114 MMHG | BODY MASS INDEX: 38.09 KG/M2 | HEART RATE: 88 BPM | WEIGHT: 207 LBS | DIASTOLIC BLOOD PRESSURE: 82 MMHG

## 2019-04-25 DIAGNOSIS — K21.9 GASTROESOPHAGEAL REFLUX DISEASE WITHOUT ESOPHAGITIS: ICD-10-CM

## 2019-04-25 DIAGNOSIS — R10.13 EPIGASTRIC PAIN: ICD-10-CM

## 2019-04-25 DIAGNOSIS — A04.8 H. PYLORI INFECTION: Primary | ICD-10-CM

## 2019-04-25 PROCEDURE — 99999 PR PBB SHADOW E&M-EST. PATIENT-LVL IV: ICD-10-PCS | Mod: PBBFAC,,, | Performed by: PHYSICIAN ASSISTANT

## 2019-04-25 PROCEDURE — 99214 PR OFFICE/OUTPT VISIT, EST, LEVL IV, 30-39 MIN: ICD-10-PCS | Mod: S$GLB,,, | Performed by: PHYSICIAN ASSISTANT

## 2019-04-25 PROCEDURE — 99214 OFFICE O/P EST MOD 30 MIN: CPT | Mod: S$GLB,,, | Performed by: PHYSICIAN ASSISTANT

## 2019-04-25 PROCEDURE — 3008F PR BODY MASS INDEX (BMI) DOCUMENTED: ICD-10-PCS | Mod: CPTII,S$GLB,, | Performed by: PHYSICIAN ASSISTANT

## 2019-04-25 PROCEDURE — 3008F BODY MASS INDEX DOCD: CPT | Mod: CPTII,S$GLB,, | Performed by: PHYSICIAN ASSISTANT

## 2019-04-25 PROCEDURE — 99999 PR PBB SHADOW E&M-EST. PATIENT-LVL IV: CPT | Mod: PBBFAC,,, | Performed by: PHYSICIAN ASSISTANT

## 2019-04-25 RX ORDER — OMEPRAZOLE 40 MG/1
40 CAPSULE, DELAYED RELEASE ORAL EVERY MORNING
Qty: 30 CAPSULE | Refills: 2 | Status: SHIPPED | OUTPATIENT
Start: 2019-04-25 | End: 2019-09-26 | Stop reason: SDUPTHER

## 2019-04-25 RX ORDER — SUCRALFATE 1 G/1
1 TABLET ORAL 2 TIMES DAILY
Qty: 60 TABLET | Refills: 1 | Status: SHIPPED | OUTPATIENT
Start: 2019-04-25 | End: 2019-08-16

## 2019-04-25 NOTE — PATIENT INSTRUCTIONS
1. Off all Antibiotics for 4 (Four) weeks before stool collection.       2. Off all Proton Pump Inhibitors medications for 2 (Two) weeks before collecting stool for H. Pylori Antigen:     Nexium (esomeprazole)   Prilosec (omeprazole)   Protonix (pantoprazole)   Prevacid (lansoprazole)   Aciphex (rabeprazole)   Dexilant (dexlansoprazole)     Zegerid     3. Off all H2 blockers medications for 2 (Two) weeks before collecting stool for H. Pylori Antigen:     Zantac (ranitidine)   Tagamet (cimetadine)   Axid (nizatidine)   Pepcid (famotidine)     4. Off Pepto-Bismol for 4 (four) weeks prior to collecting stool for H. Pylori Antigen.       For GERD:    Take your omeprazole 30-45 minutes before your first protein containing meal (breakfast) every day.    Remain upright for at least 3 hours after eating.    Elevate the head of the bead about 6 inches.  Some patients place cinder blocks under the head of the bed for elevation.    Avoid foods that you have noticed make your symptoms worse.    Set a weight loss goal of 10% of your body weight. (For example, if you weigh 150 lbs, your goal should be to loose 15 lbs).      GERD  Worst Foods for Acid Reflux  Chocolate (milk chocolate worse than dark chocolate)  Soda (all carbonated beverages)  Alcohol (beer, liquor, wine)  Fried foods  Roberts, sausage, ribs  Cream sauce  Fatty meats (beef)  Butter, margarine, lard, shortening  Coffee, tea  Mint   High fat nuts  Hot sauces and pepper  Citrus fruit/juices      Acidic foods (pH - 1 is MORE acidic, 5 is LESS acidic)     Do not eat or drink these (lower numbers are worse)    Induction diet - For 2 weeks eat nothing below pH 5     Lemon juice 2.3  Grape cranberry juice 2.5  Stomach Acid 2.5  Gelatin Dessert 2.6  Lemon/lime 2.9/2.7  Vinegar 2.9  Gatorade 3.0  Fruits - plums, apricots, strawberries, cherries 3.0  Vitamin C (ascorbic acid) 3.0  Iced tea, Snapple 3.1  Mustard 3.2  Soft drinks 3.3  Nectarines 3.3  Pomegranate  3.3  Applesauce 3.4  Grapefruit 3.4  Kiwi 3.4  Barbecue sauce 3.4  Caesar dressing 3.5  Thousand island dressing 3.6  Strawberries 3.5  Pineapple juice 3.5  Beer 3.5  Wine 3.5  Grape 3.6  Apples 3.6  Pineapple 3.7  Pickle 3.7  Blackberries, blueberries 3.7  [place]lyn 3.7  Orange 3.8  Cherries 3.9  Red Bull 3.9  Tomatoes 4.2  Coffee 5.1      These are Safe foods:  Agave  Aloe Vera  Apple (only red)  Bagels  Banana (worsens reflux in 1%)  Beans - black, red, lima, lentils  Bread - whole grain, rye  Caramel  Celery  Chamomile tea  Chicken - skinless, never fried  Chicken stock or bouillon  Coffee - one cup/day with milk  Fennel  Fish  Tavia  Green vegetables (no green peppers)  Herbs  Honey  Melon  Milk - skin, soy, or Lactaid skim milk  Mushrooms  Oatmeal  Olive oil  Parsley  Pasta  Pears  Popcorn  Potatoes  Red bell peppers  Rice  Soups  Tofu  Turkey Breast  Turnip  Vegetables - no onion, tomatoes, peppers  Vinaigrette  Water - non carbonated  Whole grain breads, crackers, breakfast cereals      Best Foods for Acid Reflux  Whole grain breads  Oatmeal  Aloe Vera  Salad (no tomatoes, onions, cheese, or high fat dressing)  Banana  Melon  Fennel  Chicken and turkey (skinless, never fried)  Fish/seafood (never fried)  Celery  Parsley  Couscous and Rice    Maybe bad foods (Everyone is unique)  Tomatoes  Garlic  Onion  Nuts (macadamia nuts)  Apples (especially green)  Cucumber  Green peppers  Spicy food  Some herbal teas    GERD tips  Change what you eat:  Eat smaller meals  Eat slowly and chew thoroughly until food is almost liquid  Cut down on junk carbohydrates such as sugar and white flour  Use herbs in your cooking  Eat more raw foods (more than 10 ingredients is not a raw food)  Avoid trans fats and partially hydrogenated oils  Eat more fish and switch to grass fed beef  Switch your cooking oil to macadamia nut or olive oil  Watch extremes of salt intake (too high or too low is bad)    Change these habits:  Stop  smoking  Eat dinner earlier (3-4 hours before lying down to sleep)  Elevate the head of your bed 6 inches (blocks under the head of the bed are better than pillows)  Exercise (but wait 2 hours after eating)  Drink more water (between meals)    Take these supplements:  Multi vitamin  Probiotic  Fish oil    Most common food allergens: milk, eggs, peanuts, tree nuts, fish, shellfish, wheat, and soy    All natural immediate relief:  Chew 2-3 soft probiotic capsules - Dr. Gleason's Probiotics 12 Plus  Chew chewable DGL licorice tablet  Chew papaya tablet with high protein meal - [employer]  Drink 2 ounces of aloe vera juice  Swedish bitters  Prelief- reduce the acid in food to keep it form burning sensitive tissue  Iberogast  S[place]ery [place]m  Drink Chamomile Tea  Teaspoon of baking soda in water  Spoonful of vinegar in water      All natural ulcer healers:  Zinc carnosine - 75.5 mg with food twice a day x 8 weeks   PepZinGI by Sandy - $8 for 60 pills  DGL (deglycyrrhizinated licorice) - 2 tablets before meals. Heals stomach lining   Natural Factors brand, Enzymatic therapy brand.  Aloe Vera juice  - 2 to 8 ounces a day   [place] or [place]

## 2019-04-25 NOTE — PROGRESS NOTES
"    [facility] Consultation Note    Reason for Consult:  The primary encounter diagnosis was H. pylori infection. Diagnoses of Gastroesophageal reflux disease without esophagitis and Epigastric pain were also pertinent to this visit.    PCP:   Nick Reeves Jr       Referring MD:  No referring provider defined for this encounter.    Initial HPI:  This is a 43 y.o. female here for evaluation of abd pain  Last seen by Dr. Main 2 months ago for upper abdominal pain    03/04/2019 EGD- Gastric inlet patch seen in upper esophagus                        - A single lesion suspicious for aberrant pancreas                         was found in the stomach. Biopsied. Treated with                         bipolar cautery for post biopsy bleeding.                        - Normal examined duodenum. Biopsied.                        - Biopsies were taken with a cold forceps for                         Helicobacter pylori testing.  -biopsies were positive for H pylori, +intestinal metaplasia  She was treated with clarithromycin, amoxicillin, and omeprazole    Interval history  60-70% better after treatment  Still having daily epigastric pain  + admits to intake of fried foods  Taking pain meds twice daily    02/04/2019 visit notes  Had possible diverticulitis 2 years ago but repeat CT normal and colon normal.  States she vomits when she takes aspirin or alcohol.  States symptoms get better when she takes flagyl    Abdominal pain - no  Reflux - no  Dysphagia - no   Bowel habits - "i'm never regular". Takes miralax for constipation, imodium for diarrhea.  GI bleeding - none  NSAID usage - used to take excedrin/BC powder years ago    ROS:  Constitutional: No fevers, chills, No weight loss  ENT: No allergies  CV: No chest pain  Pulm: No cough, No shortness of breath  Ophtho: No vision changes  GI: see HPI  Derm: No rash  Heme: No lymphadenopathy, No bruising  MSK: + crush injury on foot treated with " percocets  : + interstitial cystitis, + BV  Endo: No hot or cold intolerance  Neuro: + migraines  Psych: No anxiety, No depression    Medical History:  has a past medical history of Anxiety, Dizziness, H. pylori infection, Interstitial cystitis, and Migraine headache.    Surgical History:  has a past surgical history that includes Hysterectomy;  section, classic; Oophorectomy; Colonoscopy (N/A, 2016); Tonsillectomy; Injection of botulinum toxin type A (N/A, 2018); Instillation of urinary bladder (N/A, 2018); Esophagogastroduodenoscopy (N/A, 3/4/2019); Breast surgery; Cystoscopy with hydrodistension of bladder (N/A, 3/13/2019); Instillation of urinary bladder (N/A, 3/13/2019); and Injection of botulinum toxin type A (N/A, 3/13/2019).    Family History: family history is not on file..     Social History:  reports that she has never smoked. She has never used smokeless tobacco. She reports that she does not drink alcohol or use drugs.    Review of patient's allergies indicates:   Allergen Reactions    Bactrim [sulfamethoxazole-trimethoprim] Other (See Comments)     headache    Codeine Other (See Comments)     Other reaction(s): Vomiting       Medication List with Changes/Refills   New Medications    OMEPRAZOLE (PRILOSEC) 40 MG CAPSULE    Take 1 capsule (40 mg total) by mouth every morning.    SUCRALFATE (CARAFATE) 1 GRAM TABLET    Take 1 tablet (1 g total) by mouth 2 (two) times daily. Separate from other meds by 1 hr   Current Medications    BUTALBITAL-ACETAMINOPHEN-CAFFEINE -40 MG (FIORICET, ESGIC) -40 MG PER TABLET    Take 1-2 tablets by mouth every 6 (six) hours as needed for Headaches.    DICLOFENAC (CATAFLAM) 50 MG TABLET    TAKE 1 TABLET BY MOUTH TWO TIMES DAILY AS NEEDED FOR PAIN    DICYCLOMINE (BENTYL) 20 MG TABLET    Take 1 tablet (20 mg total) by mouth before meals as needed (tid prn).    FLOMAX 0.4 MG CAP    Take 1 capsule (0.4 mg total) by mouth once daily.     "HYDROXYZINE HCL (ATARAX) 25 MG TABLET    TAKE 1 TABLET BY MOUTH EVERY EVENING    OXYBUTYNIN (DITROPAN-XL) 10 MG 24 HR TABLET    TAKE 1 TABLET BY MOUTH EVERY EVENING    OXYCODONE-ACETAMINOPHEN (PERCOCET)  MG PER TABLET    Take 1 tablet by mouth every 12 (twelve) hours as needed.   Discontinued Medications    OMEPRAZOLE (PRILOSEC) 20 MG CAPSULE    Take 1 capsule (20 mg total) by mouth 2 (two) times daily. for 14 days         Objective Findings:    Vital Signs:  /82   Pulse 88   Ht 5' 2" (1.575 m)   Wt 93.9 kg (207 lb)   LMP 08/26/2008   BMI 37.86 kg/m²   Body mass index is 37.86 kg/m².    Physical Exam:  General Appearance: Well appearing in no acute distress  Head:   Normocephalic, without obvious abnormality  Eyes:    No scleral icterus, EOMI  ENT: Neck supple, Lips, mucosa, and tongue normal; teeth and gums normal  Lungs: CTA bilaterally in anterior and posterior fields, no wheezes, no crackles.  Heart:  Regular rate and rhythm, S1, S2 normal, no murmurs heard  Abdomen: Soft, epigastric tenderness, non distended with positive bowel sounds in all four quadrants.   Extremities: noedema  Skin: No rash  Neurologic: AAO x 3      Labs:  Lab Results   Component Value Date    WBC 9.03 03/04/2019    HGB 12.9 03/04/2019    HCT 39.5 03/04/2019     03/04/2019    CHOL 211 (H) 08/03/2007    TRIG 72 08/03/2007    HDL 94 (H) 08/03/2007    ALT 27 03/04/2019    AST 24 03/04/2019     03/04/2019    K 3.6 03/04/2019     03/04/2019    CREATININE 0.8 03/04/2019    BUN 11 03/04/2019    CO2 27 03/04/2019    TSH 1.5 08/03/2007    INR 0.9 09/08/2016           Imaging:    Endoscopy:    03/04/2019 EGD- Gastric inlet patch seen in upper esophagus                        - A single lesion suspicious for aberrant pancreas                         was found in the stomach. Biopsied. Treated with                         bipolar cautery for post biopsy bleeding.                        - Normal examined duodenum. " Biopsied.                        - Biopsies were taken with a cold forceps for                         Helicobacter pylori testing.  -biopsies were positive for H pylori, +intestinal metaplasia    Colon 2016 - wnl    Assessment:  1. H. pylori infection    2. Gastroesophageal reflux disease without esophagitis    3. Epigastric pain      43-year-old female presented 2 months ago with epigastric pain. EGD revealed H pylori gastritis.  Today she is feeling 60-70% better after taking H pylori treatment.  Still having some epigastric pain and GERD    Recommendations:  1.  Stool test to confirm H pylori eradication  2.  Prilosec 40 mg daily for 8-12 weeks  3.  Carafate 1 g twice daily for 8 weeks  4.  GERD diet    Consider abdominal ultrasound if pain persists at next visit  Follow up in about 3 months (around 7/25/2019).      Order summary:  Orders Placed This Encounter    H. pylori antigen, stool    sucralfate (CARAFATE) 1 gram tablet    omeprazole (PRILOSEC) 40 MG capsule         Thank you so much for allowing me to participate in the care of Elana Garciaenrique Jean Baptiste PA-C

## 2019-06-04 ENCOUNTER — TELEPHONE (OUTPATIENT)
Dept: GASTROENTEROLOGY | Facility: CLINIC | Age: 44
End: 2019-06-04

## 2019-06-04 NOTE — TELEPHONE ENCOUNTER
Ma spoke to pt informed pt to follow instructions given when collecting stool     Pt verbalize understanding and thank Ma     ----- Message from Angela Ambrose sent at 6/4/2019  4:44 PM CDT -----  Contact: Self- [phone]  Jose David- pt called with questions regarding the stool sample she needs to drop off in [place] tomorrow- please contact pt at [phone]

## 2019-06-05 ENCOUNTER — LAB VISIT (OUTPATIENT)
Dept: LAB | Facility: HOSPITAL | Age: 44
End: 2019-06-05
Payer: COMMERCIAL

## 2019-06-05 DIAGNOSIS — A04.8 H. PYLORI INFECTION: ICD-10-CM

## 2019-06-05 PROCEDURE — 87338 HPYLORI STOOL AG IA: CPT

## 2019-06-12 LAB — H PYLORI AG STL QL IA: DETECTED

## 2019-06-13 ENCOUNTER — TELEPHONE (OUTPATIENT)
Dept: GASTROENTEROLOGY | Facility: CLINIC | Age: 44
End: 2019-06-13

## 2019-06-13 DIAGNOSIS — A04.8 H. PYLORI INFECTION: Primary | ICD-10-CM

## 2019-06-13 RX ORDER — METRONIDAZOLE 250 MG/1
250 TABLET ORAL EVERY 6 HOURS
Qty: 56 TABLET | Refills: 0 | Status: SHIPPED | OUTPATIENT
Start: 2019-06-13 | End: 2019-06-27

## 2019-06-13 RX ORDER — TETRACYCLINE HYDROCHLORIDE 250 MG/1
500 CAPSULE ORAL EVERY 6 HOURS
Qty: 112 CAPSULE | Refills: 0 | Status: SHIPPED | OUTPATIENT
Start: 2019-06-13 | End: 2019-06-17

## 2019-06-13 NOTE — TELEPHONE ENCOUNTER
Pt aware and understanding her h.pylori stool test was positive and Markel sent 2 antibiotics and 1 otc medicine to her pharmacy that she need to take as directed with food for 2 weeks.

## 2019-06-17 ENCOUNTER — TELEPHONE (OUTPATIENT)
Dept: GASTROENTEROLOGY | Facility: CLINIC | Age: 44
End: 2019-06-17

## 2019-06-17 DIAGNOSIS — A04.8 H. PYLORI INFECTION: Primary | ICD-10-CM

## 2019-06-17 RX ORDER — DOXYCYCLINE 100 MG/1
100 CAPSULE ORAL EVERY 12 HOURS
Qty: 28 CAPSULE | Refills: 0 | Status: SHIPPED | OUTPATIENT
Start: 2019-06-17 | End: 2019-07-01

## 2019-06-17 NOTE — TELEPHONE ENCOUNTER
----- Message from Jamir Lenz MA sent at 6/14/2019  1:40 PM CDT -----  Contact: pt [phone]      ----- Message -----  From: Nikki Walton  Sent: 6/14/2019   1:19 PM  To: [facility] Staff    Needs Advice    Reason for call: tetracycline Prior Auth is needed for (ACHROMYCIN,SUMYCIN) 250 MG capsule         Communication Preference:   [facility] [place] [facility] [place] [address]  [place] [address]  Phone: [phone] Fax: [phone]    Additional Information:

## 2019-06-18 ENCOUNTER — TELEPHONE (OUTPATIENT)
Dept: GASTROENTEROLOGY | Facility: CLINIC | Age: 44
End: 2019-06-18

## 2019-06-18 NOTE — TELEPHONE ENCOUNTER
----- Message from Jamir Lenz MA sent at 6/17/2019  5:09 PM CDT -----  Contact: pt: [phone]  Pt asked is she only supposed to be taking the dicyclomine and flagyl or is she supposed to be taking the tetracycline as well and she said that her pharmacy told her that you sent a prescription for Pepto and she wanted to know why do she need to take that  ----- Message -----  From: Ender Black  Sent: 6/17/2019  12:12 PM  To: [facility] Staff    Needs Advice    Reason for call: pt would like to speak with someone re Rxs        Communication Preference: pt: [phone]

## 2019-06-18 NOTE — TELEPHONE ENCOUNTER
Pt aware and understanding Bismuth is the same thing as Chelsey said she can get it OTC and to take 2 pills every 6hrs for 2 weeks

## 2019-07-25 ENCOUNTER — TELEPHONE (OUTPATIENT)
Dept: GASTROENTEROLOGY | Facility: CLINIC | Age: 44
End: 2019-07-25

## 2019-07-25 ENCOUNTER — LAB VISIT (OUTPATIENT)
Dept: LAB | Facility: HOSPITAL | Age: 44
End: 2019-07-25
Attending: UROLOGY
Payer: COMMERCIAL

## 2019-07-25 ENCOUNTER — OFFICE VISIT (OUTPATIENT)
Dept: GASTROENTEROLOGY | Facility: CLINIC | Age: 44
End: 2019-07-25
Payer: COMMERCIAL

## 2019-07-25 ENCOUNTER — TELEPHONE (OUTPATIENT)
Dept: UROLOGY | Facility: CLINIC | Age: 44
End: 2019-07-25

## 2019-07-25 VITALS
DIASTOLIC BLOOD PRESSURE: 87 MMHG | WEIGHT: 202 LBS | HEART RATE: 100 BPM | HEIGHT: 63 IN | BODY MASS INDEX: 35.79 KG/M2 | SYSTOLIC BLOOD PRESSURE: 120 MMHG

## 2019-07-25 DIAGNOSIS — R39.89 BLADDER PAIN: Primary | ICD-10-CM

## 2019-07-25 DIAGNOSIS — R39.89 BLADDER PAIN: ICD-10-CM

## 2019-07-25 DIAGNOSIS — B37.31 VAGINA, CANDIDIASIS: ICD-10-CM

## 2019-07-25 DIAGNOSIS — A04.8 H. PYLORI INFECTION: Primary | ICD-10-CM

## 2019-07-25 DIAGNOSIS — K62.5 RECTAL BLEEDING: ICD-10-CM

## 2019-07-25 DIAGNOSIS — R10.11 RUQ PAIN: ICD-10-CM

## 2019-07-25 DIAGNOSIS — K21.9 GASTROESOPHAGEAL REFLUX DISEASE WITHOUT ESOPHAGITIS: ICD-10-CM

## 2019-07-25 DIAGNOSIS — R10.13 EPIGASTRIC PAIN: ICD-10-CM

## 2019-07-25 PROCEDURE — 87077 CULTURE AEROBIC IDENTIFY: CPT | Mod: 59

## 2019-07-25 PROCEDURE — 3008F PR BODY MASS INDEX (BMI) DOCUMENTED: ICD-10-PCS | Mod: CPTII,S$GLB,, | Performed by: PHYSICIAN ASSISTANT

## 2019-07-25 PROCEDURE — 99214 OFFICE O/P EST MOD 30 MIN: CPT | Mod: S$GLB,,, | Performed by: PHYSICIAN ASSISTANT

## 2019-07-25 PROCEDURE — 3008F BODY MASS INDEX DOCD: CPT | Mod: CPTII,S$GLB,, | Performed by: PHYSICIAN ASSISTANT

## 2019-07-25 PROCEDURE — 87186 SC STD MICRODIL/AGAR DIL: CPT | Mod: 59

## 2019-07-25 PROCEDURE — 99999 PR PBB SHADOW E&M-EST. PATIENT-LVL IV: CPT | Mod: PBBFAC,,, | Performed by: PHYSICIAN ASSISTANT

## 2019-07-25 PROCEDURE — 99999 PR PBB SHADOW E&M-EST. PATIENT-LVL IV: ICD-10-PCS | Mod: PBBFAC,,, | Performed by: PHYSICIAN ASSISTANT

## 2019-07-25 PROCEDURE — 87088 URINE BACTERIA CULTURE: CPT

## 2019-07-25 PROCEDURE — 87086 URINE CULTURE/COLONY COUNT: CPT

## 2019-07-25 PROCEDURE — 99214 PR OFFICE/OUTPT VISIT, EST, LEVL IV, 30-39 MIN: ICD-10-PCS | Mod: S$GLB,,, | Performed by: PHYSICIAN ASSISTANT

## 2019-07-25 RX ORDER — FLUCONAZOLE 150 MG/1
150 TABLET ORAL DAILY
Qty: 1 TABLET | Refills: 0 | Status: SHIPPED | OUTPATIENT
Start: 2019-07-25 | End: 2019-07-26

## 2019-07-25 NOTE — TELEPHONE ENCOUNTER
----- Message from Shannan Frank LPN sent at 7/24/2019  4:13 PM CDT -----  Contact: pt   Sabina, talk to me about this in the am. Pt will be there about 8am  ----- Message -----  From: Nikki Walton  Sent: 7/24/2019   9:53 AM  To: Silvio ROMEO Staff    Needs Advice    Reason for call: pt called stated it hurts when she urinate. She is not sure if to make an appt.  She also stated she have an appt in gasto tomorrow        Communication Preference: [phone]    Additional Information:

## 2019-07-25 NOTE — PATIENT INSTRUCTIONS
Start taking a probiotic    Stop the prilosec for 2 weeks then turn in your stool    Apply Over the counter hydrocortisone cream and aquaphor ointment for hemorrhoids      1. Off all Antibiotics for 4 (Four) weeks before stool collection.         2. Off all Proton Pump Inhibitors medications for 2 (Two) weeks before collecting stool for H. Pylori Antigen:     Nexium (esomeprazole)   Prilosec (omeprazole)   Protonix (pantoprazole)   Prevacid (lansoprazole)   Aciphex (rabeprazole)   Dexilant (dexlansoprazole)     Zegerid     3. Off all H2 blockers medications for 2 (Two) weeks before collecting stool for H. Pylori Antigen:     Zantac (ranitidine)   Tagamet (cimetadine)   Axid (nizatidine)   Pepcid (famotidine)     4. Off Pepto-Bismol for 4 (four) weeks prior to collecting stool for H. Pylori Antigen.

## 2019-07-25 NOTE — TELEPHONE ENCOUNTER
Pt aware and understanding of abdominal ultrasound on 07/29/19 at 1:45Pm to rule out gallstones a potential cause of her abdominal pain

## 2019-07-25 NOTE — PROGRESS NOTES
[facility] Consultation Note    Reason for Consult:  The primary encounter diagnosis was H. pylori infection. Diagnoses of Gastroesophageal reflux disease without esophagitis, Vagina, candidiasis, and Rectal bleeding were also pertinent to this visit.    PCP:   Nick Reeves Jr       Referring MD:  No referring provider defined for this encounter.    Initial HPI:  This is a 44 y.o. female here to follow-up on her abdominal pain  At her last visit she was treated for 2nd time for H pylori  Given Flagyl, tetracycline, bismuth, ppi  Initially treated with clarithromycin, amoxicillin, and omeprazole    Interval history  Today she is feeling a little better  still having intermittent epigastric and RUQ pain    Had recent episode of hemorrhoidal swelling and bleeding  Associated anoretal Itching   Last colonoscopy was 3 years ago    She reports having a vaginal yeast infection from taking the antibiotics.  Requests Diflucan    ROS:  Constitutional: No fevers, chills, No weight loss  ENT: No allergies  CV: No chest pain  Pulm: No cough, No shortness of breath  Ophtho: No vision changes  GI: see HPI  Derm: No rash  Heme: No lymphadenopathy, No bruising  MSK: + crush injury on foot treated with percocets  : + interstitial cystitis, + BV  Endo: No hot or cold intolerance  Neuro: + migraines  Psych: No anxiety, No depression    Medical History:  has a past medical history of Anxiety, Dizziness, GERD (gastroesophageal reflux disease), H. pylori infection, Interstitial cystitis, and Migraine headache.    Surgical History:  has a past surgical history that includes Hysterectomy;  section, classic; Oophorectomy; Colonoscopy (N/A, 2016); Tonsillectomy; Injection of botulinum toxin type A (N/A, 2018); Instillation of urinary bladder (N/A, 2018); Esophagogastroduodenoscopy (N/A, 3/4/2019); Breast surgery; Cystoscopy with hydrodistension of bladder (N/A, 3/13/2019); Instillation of  "urinary bladder (N/A, 3/13/2019); Injection of botulinum toxin type A (N/A, 3/13/2019); and Upper gastrointestinal endoscopy.    Family History: family history is not on file..     Social History:  reports that she has never smoked. She has never used smokeless tobacco. She reports that she does not drink alcohol or use drugs.    Review of patient's allergies indicates:   Allergen Reactions    Bactrim [sulfamethoxazole-trimethoprim] Other (See Comments)     headache    Codeine Other (See Comments)     Other reaction(s): Vomiting       Medication List with Changes/Refills   New Medications    FLUCONAZOLE (DIFLUCAN) 150 MG TAB    Take 1 tablet (150 mg total) by mouth once daily. for 1 day   Current Medications    BISMUTH SUBSALICYLATE (BISMUTH) 262 MG TAB    Take 2 tablets by mouth every 6 (six) hours.    BUTALBITAL-ACETAMINOPHEN-CAFFEINE -40 MG (FIORICET, ESGIC) -40 MG PER TABLET    Take 1-2 tablets by mouth every 6 (six) hours as needed for Headaches.    DICLOFENAC (CATAFLAM) 50 MG TABLET    TAKE 1 TABLET BY MOUTH TWO TIMES DAILY AS NEEDED FOR PAIN    DICYCLOMINE (BENTYL) 20 MG TABLET    Take 1 tablet (20 mg total) by mouth before meals as needed (tid prn).    FLOMAX 0.4 MG CAP    Take 1 capsule (0.4 mg total) by mouth once daily.    HYDROXYZINE HCL (ATARAX) 25 MG TABLET    TAKE 1 TABLET BY MOUTH EVERY EVENING    OMEPRAZOLE (PRILOSEC) 40 MG CAPSULE    Take 1 capsule (40 mg total) by mouth every morning.    OXYBUTYNIN (DITROPAN-XL) 10 MG 24 HR TABLET    TAKE 1 TABLET BY MOUTH EVERY EVENING    OXYCODONE-ACETAMINOPHEN (PERCOCET)  MG PER TABLET    Take 1 tablet by mouth every 12 (twelve) hours as needed.    SUCRALFATE (CARAFATE) 1 GRAM TABLET    Take 1 tablet (1 g total) by mouth 2 (two) times daily. Separate from other meds by 1 hr         Objective Findings:    Vital Signs:  /87   Pulse 100   Ht 5' 3" (1.6 m)   Wt 91.6 kg (202 lb)   LMP 08/26/2008   BMI 35.78 kg/m²   Body mass index is " 35.78 kg/m².    Physical Exam:  General Appearance: Well appearing in no acute distress  Head:   Normocephalic, without obvious abnormality  Eyes:    No scleral icteru  ENT: Neck supple, Lips, mucosa, and tongue normal; teeth and gums normal  Lungs: CTA bilaterally in anterior and posterior fields, no wheezes, no crackles.  Heart:  Regular rate and rhythm, S1, S2 normal, no murmurs heard  Abdomen: Soft, epigastric tenderness, no guarding or rebound tenderness non distended with positive bowel sounds in all four quadrants.   Extremities: noedema  Skin: No rash  Neurologic: AAO x 3      Labs:  Lab Results   Component Value Date    WBC 9.03 03/04/2019    HGB 12.9 03/04/2019    HCT 39.5 03/04/2019     03/04/2019    CHOL 211 (H) 08/03/2007    TRIG 72 08/03/2007    HDL 94 (H) 08/03/2007    ALT 27 03/04/2019    AST 24 03/04/2019     03/04/2019    K 3.6 03/04/2019     03/04/2019    CREATININE 0.8 03/04/2019    BUN 11 03/04/2019    CO2 27 03/04/2019    TSH 1.5 08/03/2007    INR 0.9 09/08/2016           Imaging:    Endoscopy:    03/04/2019 EGD- Gastric inlet patch seen in upper esophagus                        - A single lesion suspicious for aberrant pancreas                         was found in the stomach. Biopsied. Treated with                         bipolar cautery for post biopsy bleeding.                        - Normal examined duodenum. Biopsied.                        - Biopsies were taken with a cold forceps for                         Helicobacter pylori testing.  -biopsies were positive for H pylori, +intestinal metaplasia    Colon 2016 - wnl    Assessment:  1. H. pylori infection    2. Gastroesophageal reflux disease without esophagitis    3. Vagina, candidiasis    4. Rectal bleeding      43-year-old female presented 2 months ago with epigastric pain. EGD revealed H pylori gastritis.  Today she is still having some intermittent upper abdominal pain after 2 rounds of H pylori treatment      Recommendations:  1.  Check stool antigen to confirm eradication  2.  Abdominal ultrasound to rule out gallstones  3.  Diflucan for yeast infection  4. Advise she use over-the-counter hydrocortisone cream as needed for hemorrhoidal bleeding  No follow-ups on file.      Order summary:  Orders Placed This Encounter    H. pylori antigen, stool    fluconazole (DIFLUCAN) 150 MG Tab         Thank you so much for allowing me to participate in the care of Elana Christianenrique Jean Baptiste PA-C

## 2019-07-27 LAB
BACTERIA UR CULT: ABNORMAL
BACTERIA UR CULT: ABNORMAL

## 2019-07-29 ENCOUNTER — TELEPHONE (OUTPATIENT)
Dept: GASTROENTEROLOGY | Facility: CLINIC | Age: 44
End: 2019-07-29

## 2019-07-29 ENCOUNTER — TELEPHONE (OUTPATIENT)
Dept: UROLOGY | Facility: CLINIC | Age: 44
End: 2019-07-29

## 2019-07-29 DIAGNOSIS — N30.00 ACUTE CYSTITIS WITHOUT HEMATURIA: Primary | ICD-10-CM

## 2019-07-29 RX ORDER — AMOXICILLIN AND CLAVULANATE POTASSIUM 875; 125 MG/1; MG/1
1 TABLET, FILM COATED ORAL 2 TIMES DAILY
Qty: 14 TABLET | Refills: 0 | Status: SHIPPED | OUTPATIENT
Start: 2019-07-29 | End: 2019-08-05

## 2019-07-29 NOTE — TELEPHONE ENCOUNTER
Pt aware and understanding her abdominal ultrasound look good and there were no signs of gallstones

## 2019-07-29 NOTE — TELEPHONE ENCOUNTER
----- Message from Jamir Lenz MA sent at 7/29/2019  4:45 PM CDT -----  Contact: Self- [phone]      ----- Message -----  From: Angela Ambrose  Sent: 7/29/2019   3:19 PM  To: Markel Jean Baptiste- pt called to determine if she can have a rx called in for nausea- pt would like to discuss before rx is called in- please contact pt at [phone]

## 2019-07-29 NOTE — TELEPHONE ENCOUNTER
Acute cystitis without hematuria  -     amoxicillin-clavulanate 875-125mg (AUGMENTIN) 875-125 mg per tablet; Take 1 tablet by mouth 2 (two) times daily. for 7 days  Dispense: 14 tablet; Refill: 0    eRxed to the pharmacy.  Please call and advise the patient to take the medication as given.

## 2019-07-30 ENCOUNTER — TELEPHONE (OUTPATIENT)
Dept: GASTROENTEROLOGY | Facility: CLINIC | Age: 44
End: 2019-07-30

## 2019-07-30 DIAGNOSIS — R11.0 NAUSEA: Primary | ICD-10-CM

## 2019-07-30 NOTE — TELEPHONE ENCOUNTER
----- Message from Jamir Lenz MA sent at 7/29/2019  4:45 PM CDT -----  Contact: Self- [phone]      ----- Message -----  From: Angela Ambrose  Sent: 7/29/2019   3:19 PM  To: Markel Jean Baptiste- pt called to determine if she can have a rx called in for nausea- pt would like to discuss before rx is called in- please contact pt at [phone]

## 2019-07-30 NOTE — TELEPHONE ENCOUNTER
Spoke with pt and she said she's always nausea and she would prefer the promethazine in pill form.

## 2019-07-31 RX ORDER — PROMETHAZINE HYDROCHLORIDE 12.5 MG/1
12.5 TABLET ORAL EVERY 6 HOURS PRN
Qty: 60 TABLET | Refills: 0 | Status: SHIPPED | OUTPATIENT
Start: 2019-07-31 | End: 2020-01-28

## 2019-08-15 ENCOUNTER — TELEPHONE (OUTPATIENT)
Dept: UROLOGY | Facility: CLINIC | Age: 44
End: 2019-08-15

## 2019-08-15 NOTE — TELEPHONE ENCOUNTER
----- Message from Sabina Mills LPN sent at 8/15/2019  1:55 PM CDT -----  Contact: pt: [phone]   Pt states the last hydro distention did not work as well as it usually does. She was given an antibiotic recently and still doesn't' feel right. I offered her to come in at 3pm today and she can not make it. She would like a call if possible   ----- Message -----  From: Ender Black  Sent: 8/15/2019  10:31 AM  To: Silvio ROMEO Staff    Needs Advice    Reason for call: pt would like to speak with nurse, states she's still having problems did not go into details       Communication Preference: pt: [phone]

## 2019-08-15 NOTE — TELEPHONE ENCOUNTER
Recently treated for UTI.  She can come and see me tomorrow AM.  She has an appt at 8:30 am and will come afterward.  Please add her on to my clinic schedule.

## 2019-08-16 ENCOUNTER — OFFICE VISIT (OUTPATIENT)
Dept: UROLOGY | Facility: CLINIC | Age: 44
End: 2019-08-16
Payer: COMMERCIAL

## 2019-08-16 VITALS
DIASTOLIC BLOOD PRESSURE: 81 MMHG | HEIGHT: 63 IN | SYSTOLIC BLOOD PRESSURE: 117 MMHG | HEART RATE: 91 BPM | BODY MASS INDEX: 35.16 KG/M2 | WEIGHT: 198.44 LBS

## 2019-08-16 DIAGNOSIS — N30.10 INTERSTITIAL CYSTITIS: ICD-10-CM

## 2019-08-16 DIAGNOSIS — R33.9 INCOMPLETE BLADDER EMPTYING: ICD-10-CM

## 2019-08-16 DIAGNOSIS — N32.81 OAB (OVERACTIVE BLADDER): ICD-10-CM

## 2019-08-16 DIAGNOSIS — R39.89 BLADDER PAIN: Primary | ICD-10-CM

## 2019-08-16 DIAGNOSIS — N30.00 ACUTE CYSTITIS WITHOUT HEMATURIA: ICD-10-CM

## 2019-08-16 PROCEDURE — 51700 PR IRRIGATION, BLADDER: ICD-10-PCS | Mod: S$GLB,,, | Performed by: UROLOGY

## 2019-08-16 PROCEDURE — 51700 IRRIGATION OF BLADDER: CPT | Mod: S$GLB,,, | Performed by: UROLOGY

## 2019-08-16 PROCEDURE — 99999 PR PBB SHADOW E&M-EST. PATIENT-LVL III: ICD-10-PCS | Mod: PBBFAC,,, | Performed by: UROLOGY

## 2019-08-16 PROCEDURE — 87077 CULTURE AEROBIC IDENTIFY: CPT

## 2019-08-16 PROCEDURE — 3008F BODY MASS INDEX DOCD: CPT | Mod: CPTII,S$GLB,, | Performed by: UROLOGY

## 2019-08-16 PROCEDURE — 87086 URINE CULTURE/COLONY COUNT: CPT

## 2019-08-16 PROCEDURE — 3008F PR BODY MASS INDEX (BMI) DOCUMENTED: ICD-10-PCS | Mod: CPTII,S$GLB,, | Performed by: UROLOGY

## 2019-08-16 PROCEDURE — 99999 PR PBB SHADOW E&M-EST. PATIENT-LVL III: CPT | Mod: PBBFAC,,, | Performed by: UROLOGY

## 2019-08-16 PROCEDURE — 99214 OFFICE O/P EST MOD 30 MIN: CPT | Mod: 25,S$GLB,, | Performed by: UROLOGY

## 2019-08-16 PROCEDURE — 99214 PR OFFICE/OUTPT VISIT, EST, LEVL IV, 30-39 MIN: ICD-10-PCS | Mod: 25,S$GLB,, | Performed by: UROLOGY

## 2019-08-16 PROCEDURE — 87088 URINE BACTERIA CULTURE: CPT

## 2019-08-16 PROCEDURE — 87186 SC STD MICRODIL/AGAR DIL: CPT

## 2019-08-16 RX ORDER — HEPARIN SODIUM 10000 [USP'U]/ML
20000 INJECTION, SOLUTION INTRAVENOUS; SUBCUTANEOUS
Status: COMPLETED | OUTPATIENT
Start: 2019-08-16 | End: 2019-08-16

## 2019-08-16 RX ORDER — HYDROXYZINE HYDROCHLORIDE 25 MG/1
25 TABLET, FILM COATED ORAL NIGHTLY
Qty: 90 TABLET | Refills: 3 | Status: SHIPPED | OUTPATIENT
Start: 2019-08-16 | End: 2020-04-06

## 2019-08-16 RX ORDER — LIDOCAINE HYDROCHLORIDE 10 MG/ML
1 INJECTION INFILTRATION; PERINEURAL
Status: COMPLETED | OUTPATIENT
Start: 2019-08-16 | End: 2019-08-16

## 2019-08-16 RX ORDER — BUPIVACAINE HYDROCHLORIDE 5 MG/ML
50 INJECTION, SOLUTION PERINEURAL ONCE
Status: COMPLETED | OUTPATIENT
Start: 2019-08-16 | End: 2019-08-16

## 2019-08-16 RX ORDER — OXYBUTYNIN CHLORIDE 10 MG/1
10 TABLET, EXTENDED RELEASE ORAL NIGHTLY
Qty: 90 TABLET | Refills: 3 | Status: SHIPPED | OUTPATIENT
Start: 2019-08-16 | End: 2020-03-13

## 2019-08-16 RX ORDER — TAMSULOSIN HYDROCHLORIDE 0.4 MG/1
1 CAPSULE ORAL DAILY
Qty: 90 CAPSULE | Refills: 3 | Status: SHIPPED | OUTPATIENT
Start: 2019-08-16 | End: 2019-10-08 | Stop reason: SDUPTHER

## 2019-08-16 RX ORDER — INDOMETHACIN 25 MG/1
50 CAPSULE ORAL
Status: COMPLETED | OUTPATIENT
Start: 2019-08-16 | End: 2019-08-16

## 2019-08-16 RX ORDER — GENTAMICIN SULFATE 40 MG/ML
80 INJECTION, SOLUTION INTRAMUSCULAR; INTRAVENOUS
Status: DISCONTINUED | OUTPATIENT
Start: 2019-08-16 | End: 2020-03-30 | Stop reason: CLARIF

## 2019-08-16 RX ADMIN — GENTAMICIN SULFATE 80 MG: 40 INJECTION, SOLUTION INTRAMUSCULAR; INTRAVENOUS at 10:08

## 2019-08-16 RX ADMIN — BUPIVACAINE HYDROCHLORIDE 250 MG: 5 INJECTION, SOLUTION PERINEURAL at 10:08

## 2019-08-16 RX ADMIN — INDOMETHACIN 50 MEQ: 25 CAPSULE ORAL at 10:08

## 2019-08-16 RX ADMIN — LIDOCAINE HYDROCHLORIDE 1 ML: 10 INJECTION INFILTRATION; PERINEURAL at 10:08

## 2019-08-16 RX ADMIN — HEPARIN SODIUM 20000 UNITS: 10000 INJECTION, SOLUTION INTRAVENOUS; SUBCUTANEOUS at 10:08

## 2019-08-16 NOTE — PROGRESS NOTES
"CC: bladder pain, s/p cysto hydrodistention and botox injection on 8/16/17    CHIEF COMPLAINT:     Mrs. Gonzales is a 42 y.o. female presenting for bladder pain.    PRESENTING ILLNESS:     Elana Gonzales is a 42 y.o. female with a PMH of IC who presents for possible IC flare up.  C/o bladder pain 8 to 9 out of 10 in pain scale for the last few weeks.  Pyridium helps a bit.   Her frequency urgency got better after previous botox injection.  So far OAB symptoms are not bothersome to her.  She is under stress especially due to her kids.    Has been on flomax, atarax, and oxybutynin xl.  Reports that her urine smells funny along with " bladder pressure"    s/p cysto hydrodistention and botox injection on 3/13/19.    Procedure(s) Performed:   1.  Cystoscopy with hydrodistension  2.  botox intravesical injection 100 units     Findings:   1.  Normal bladder mucosa with no lesions.  UOs in normal anatomic position  2.  100 U of Botox injected throughout detrusor  3.  hydrodistention with 700 cc and 700 cc capacity with terminal hematuria and glomerulations    Unable to take Elmiron because it makes her more difficult to urinate.  Elavil made her "pass out" and thus she stopped it.    Denies any fever or chills.     REVIEW OF SYSTEMS:  Constitutional: Negative for fever and chills.   HENT: Negative for hearing loss.   Eyes: Negative for visual disturbance.   Respiratory: Negative for shortness of breath.   Cardiovascular: Negative for chest pain.   Gastrointestinal: Positive for nausea.   Negative for  vomiting, and constipation.   Genitourinary:  Negative for urgency, frequency, difficulty urinating, nocturia, incontinence, dysuria, hematuria, intermittency, hesitancy, incomplete bladder emptying, and decreased urine volume.   Neurological: Positive for dizziness.   Hematological: Does not bruise/bleed easily.   Psychiatric/Behavioral: Negative for confusion.      PATIENT HISTORY:          Past Medical History:   Diagnosis " Date    Anxiety      Dizziness      Interstitial cystitis      Migraine headache                 Past Surgical History:   Procedure Laterality Date     SECTION, CLASSIC        COLONOSCOPY N/A 2016     Procedure: COLONOSCOPY;  Surgeon: Phuong Carrillo MD;  [facility];  Service: Endoscopy;  Laterality: N/A;    HYSTERECTOMY        OOPHORECTOMY        TONSILLECTOMY             No family history on file.     Social History   Social History            Social History    Marital status:        Spouse name: N/A    Number of children: N/A    Years of education: N/A          Occupational History    Not on file.            Social History Main Topics    Smoking status: Never Smoker    Smokeless tobacco: Never Used    Alcohol use No    Drug use: No    Sexual activity: Yes       Partners: Male           Other Topics Concern    Not on file          Social History Narrative    No narrative on file            Allergies:  Bactrim [sulfamethoxazole-trimethoprim]; Codeine; and Zofran [ondansetron hcl (pf)]     Medications:     Current Outpatient Prescriptions:     amitriptyline (ELAVIL) 25 MG tablet, Take 1 tablet (25 mg total) by mouth nightly as needed for Insomnia., Disp: 30 tablet, Rfl: 11    butalbital-acetaminophen-caffeine -40 mg (FIORICET, ESGIC) -40 mg per tablet, Take 1-2 tablets by mouth every 6 (six) hours as needed for Headaches., Disp: 50 tablet, Rfl: 1    estradiol (ESTRACE) 1 MG tablet, Take 1 mg by mouth once daily. , Disp: , Rfl:     FLOMAX 0.4 mg Cp24, Take 1 capsule (0.4 mg total) by mouth once daily., Disp: 30 capsule, Rfl: 11    hydrOXYzine HCl (ATARAX) 25 MG tablet, Take 1 tablet (25 mg total) by mouth every evening., Disp: 30 tablet, Rfl: 11    oxybutynin (DITROPAN) 5 MG Tab, TAKE 1 TABLET BY MOUTH TWO TIMES DAILY, Disp: 60 tablet, Rfl: 12    amitriptyline (ELAVIL) 50 MG tablet, Take 1 tablet (50 mg total) by mouth every evening., Disp: 30  tablet, Rfl: 11    trospium (SANCTURA) 20 mg Tab tablet, Take 1 tablet (20 mg total) by mouth 2 (two) times daily. Take it in an empty stomach, Disp: 60 tablet, Rfl: 11     PHYSICAL EXAMINATION:     Constitutional: She is oriented to person, place, and time. She appears well-developed and well-nourished.  She is in no apparent distress.  Neck: No tracheal deviation present.      Cardiovascular: Normal rate.     Pulmonary/Chest: Effort normal. No respiratory distress.      Abdominal:  She exhibits no distension.  There is no CVA tenderness.      Lymphadenopathy:          Right: No supraclavicular adenopathy present.        Left: No supraclavicular adenopathy present.    Neurological: She is alert and oriented to person, place, and time.      Skin: Skin is warm and dry.      Extremities: No pitting edema noted in lower extremities bilaterally  Psych: Cooperative with normal affect.     Physical Exam   Constitutional: She is oriented to person, place, and time. She appears well-developed and well-nourished. No distress.   HENT:   Head: Normocephalic and atraumatic.   Right Ear: External ear normal.   Left Ear: External ear normal.   Nose: Nose normal.   Eyes: Conjunctivae and EOM are normal. Pupils are equal, round, and reactive to light. No scleral icterus.   Neck: Normal range of motion. Neck supple. No JVD present. No tracheal deviation present. No thyromegaly present.   Cardiovascular: Normal rate, regular rhythm, normal heart sounds and intact distal pulses.  Exam reveals no gallop and no friction rub.    No murmur heard.  Pulmonary/Chest: Effort normal and breath sounds normal. No respiratory distress. She has no wheezes.   Abdominal: Soft. Bowel sounds are normal. She exhibits no distension and no mass. There is no tenderness. There is no rebound and no guarding.   Genitourinary: Vagina normal and uterus normal. No vaginal discharge found.   Musculoskeletal: Normal range of motion. She exhibits no edema,  tenderness or deformity.   Lymphadenopathy:     She has no cervical adenopathy.   Neurological: She is alert and oriented to person, place, and time.   Skin: Skin is warm and dry. She is not diaphoretic.     Psychiatric: She has a normal mood and affect. Her behavior is normal. Thought content normal.            LABS:  Cath urine PVR 40 ml.  U/a: 1.015, pH 5,trace nit +     IMPRESSION:     Bladder pain  -     BLADDER CATHETERIZATION  -     heparin (porcine) injection 20,000 Units  -     bupivacaine injection 250 mg  -     sodium bicarbonate solution 50 mEq  -     lidocaine HCL 10 mg/ml (1%) injection 1 mL    Acute cystitis without hematuria  -     Urine culture  -     gentamicin injection 80 mg    OAB (overactive bladder)  -     oxybutynin (DITROPAN-XL) 10 MG 24 hr tablet; Take 1 tablet (10 mg total) by mouth every evening.  Dispense: 90 tablet; Refill: 3    Interstitial cystitis  -     hydrOXYzine HCl (ATARAX) 25 MG tablet; Take 1 tablet (25 mg total) by mouth every evening.  Dispense: 90 tablet; Refill: 3    Incomplete bladder emptying  -     FLOMAX 0.4 mg Cap; Take 1 capsule (0.4 mg total) by mouth once daily.  Dispense: 90 capsule; Refill: 3         Diagnoses and all orders for this visit:  Diagnoses and all orders for this visit:    Bladder pain  -     BLADDER CATHETERIZATION  -     heparin (porcine) injection 20,000 Units  -     bupivacaine injection 250 mg  -     sodium bicarbonate solution 50 mEq  -     lidocaine HCL 10 mg/ml (1%) injection 1 mL    Acute cystitis without hematuria  -     Urine culture  -     gentamicin injection 80 mg    OAB (overactive bladder)  -     oxybutynin (DITROPAN-XL) 10 MG 24 hr tablet; Take 1 tablet (10 mg total) by mouth every evening.    Interstitial cystitis  -     hydrOXYzine HCl (ATARAX) 25 MG tablet; Take 1 tablet (25 mg total) by mouth every evening.    Incomplete bladder emptying  -     FLOMAX 0.4 mg Cap; Take 1 capsule (0.4 mg total) by mouth once daily.        Procedure;  Bladder Instillation Procedure:  A 16 F Thakur catheter was placed and the bladder was drained without problems. PVR with in and out cath was 20 cc.     Intravesical cocktail mixture treatment is given in a standard fashion.  The solution of 25 ml 0.5 % Marcaine, 10 ml 1% lidocaine, heparin 44415 units, and gent 80 mg was instilled in the bladder, and the catheter was removed. The patient was instructed to hold the mixture solution in the bladder for 20 to 30 minutes and to void as instructed. She was instructed to notify the clinic if unable to urinate.     Patient tolerated the procedure well.    PLAN:  Reassured pt and discussed plan of care,   UC sent to the lab from cath urine. Will notify her with results.   Continue flomax, hydroxyzine, oxybutynin xl.  Continue IC smart diet.  Consider drinking alkaline water, Evamor instead of other water.  Baking soda water prn recommended.  She wanted to have a repeat cysto with hydrodistention and botox. Will discuss with Dr. Anguiano.   RTC prn    I spent 25 minutes with the patient of which more than half was spent in direct consultation with the patient in regards to our treatment and plan.     Follow up:  No follow-ups on file.

## 2019-08-19 ENCOUNTER — TELEPHONE (OUTPATIENT)
Dept: UROLOGY | Facility: CLINIC | Age: 44
End: 2019-08-19

## 2019-08-19 DIAGNOSIS — N30.00 ACUTE CYSTITIS WITHOUT HEMATURIA: Primary | ICD-10-CM

## 2019-08-19 LAB — BACTERIA UR CULT: ABNORMAL

## 2019-08-19 RX ORDER — CIPROFLOXACIN 500 MG/1
500 TABLET ORAL 2 TIMES DAILY
Qty: 14 TABLET | Refills: 0 | Status: SHIPPED | OUTPATIENT
Start: 2019-08-19 | End: 2019-08-26

## 2019-08-19 NOTE — TELEPHONE ENCOUNTER
Acute cystitis without hematuria  -     ciprofloxacin HCl (CIPRO) 500 MG tablet; Take 1 tablet (500 mg total) by mouth 2 (two) times daily. for 14 doses  Dispense: 14 tablet; Refill: 0    eRxed to the pharmacy.  Please call and advise the patient to take the medication as given.

## 2019-09-17 ENCOUNTER — TELEPHONE (OUTPATIENT)
Dept: UROLOGY | Facility: CLINIC | Age: 44
End: 2019-09-17

## 2019-09-17 DIAGNOSIS — N30.10 IC (INTERSTITIAL CYSTITIS): Primary | ICD-10-CM

## 2019-09-17 NOTE — TELEPHONE ENCOUNTER
----- Message from Sabina Mills LPN sent at 9/17/2019 10:07 AM CDT -----  Contact: pt [phone]      ----- Message -----  From: Giselle Ruiz  Sent: 9/17/2019   8:53 AM  To: Silvio [facility]OMEO Staff    Pt wants to speak with you about her medication but she's unsure of the name. Please call

## 2019-09-18 NOTE — TELEPHONE ENCOUNTER
IC (interstitial cystitis)  -     pentosan polysulfate (ELMIRON) 100 mg Cap; Take 2 capsules (200 mg total) by mouth 2 (two) times daily before meals.  Dispense: 120 capsule; Refill: 12

## 2019-09-24 ENCOUNTER — TELEPHONE (OUTPATIENT)
Dept: UROLOGY | Facility: CLINIC | Age: 44
End: 2019-09-24

## 2019-09-25 NOTE — TELEPHONE ENCOUNTER
----- Message from Sabina Mills LPN sent at 9/24/2019  3:50 PM CDT -----  Contact: pt   Pt states she was fine while on the cipro-good urine flow and emptied well, once she stopped the cipro her flow is weaker and has to push to empty. She has been on ditropan and flomax for a while   ----- Message -----  From: Nikki Walton  Sent: 9/24/2019   2:18 PM CDT  To: [facility]OMEO Staff    Type:  Needs Medical Advice    Who Called: pt called stated she is stilll having symptoms after finishing antibiotic. She cant explain symptoms but she stated you would know what she is talking about  Would the patient rather a call back or a response via [facility]? call  Best Call Back Number: [phone]  Additional Information:

## 2019-09-25 NOTE — TELEPHONE ENCOUNTER
Hold off ditropan.  OK to continue flomax.  May increase flomax to BID as tolerates.  If not improving, may consider InterStim Therapy.

## 2019-09-26 ENCOUNTER — TELEPHONE (OUTPATIENT)
Dept: GASTROENTEROLOGY | Facility: CLINIC | Age: 44
End: 2019-09-26

## 2019-09-26 DIAGNOSIS — K21.9 GASTROESOPHAGEAL REFLUX DISEASE WITHOUT ESOPHAGITIS: ICD-10-CM

## 2019-09-26 RX ORDER — OMEPRAZOLE 40 MG/1
40 CAPSULE, DELAYED RELEASE ORAL EVERY MORNING
Qty: 90 CAPSULE | Refills: 1 | Status: SHIPPED | OUTPATIENT
Start: 2019-09-26 | End: 2020-03-30 | Stop reason: CLARIF

## 2019-10-08 DIAGNOSIS — R33.9 INCOMPLETE BLADDER EMPTYING: ICD-10-CM

## 2019-10-08 RX ORDER — TAMSULOSIN HYDROCHLORIDE 0.4 MG/1
1 CAPSULE ORAL DAILY
Qty: 90 CAPSULE | Refills: 3 | Status: SHIPPED | OUTPATIENT
Start: 2019-10-08 | End: 2020-01-06

## 2019-10-08 NOTE — TELEPHONE ENCOUNTER
----- Message from Giselle Ruiz sent at 10/8/2019 11:16 AM CDT -----  Contact: Yesenia with [facility]#[phone]  She states that this pt medication part of an audit and she wants to know if Dr Anguiano can send a new Rx and she needs it to state that Brand Name is medical necessary   01/23/2018 Rx date   Flomax 0.4 Mg

## 2019-11-07 ENCOUNTER — OUTSIDE PLACE OF SERVICE (OUTPATIENT)
Dept: CARDIOLOGY | Facility: CLINIC | Age: 44
End: 2019-11-07
Payer: COMMERCIAL

## 2019-11-07 PROCEDURE — 93010 ELECTROCARDIOGRAM REPORT: CPT | Mod: S$GLB,,, | Performed by: STUDENT IN AN ORGANIZED HEALTH CARE EDUCATION/TRAINING PROGRAM

## 2019-11-07 PROCEDURE — 93010 PR ELECTROCARDIOGRAM REPORT: ICD-10-PCS | Mod: S$GLB,,, | Performed by: STUDENT IN AN ORGANIZED HEALTH CARE EDUCATION/TRAINING PROGRAM

## 2019-11-08 ENCOUNTER — TELEPHONE (OUTPATIENT)
Dept: OTOLARYNGOLOGY | Facility: CLINIC | Age: 44
End: 2019-11-08

## 2019-11-08 NOTE — TELEPHONE ENCOUNTER
Spoke with patient who will see DR MANRIQUEZ 12/12/19  She will also make a follow up appointment with NEUROLOGY DR RODRIGUES. Appointment slip mailed

## 2019-11-08 NOTE — TELEPHONE ENCOUNTER
----- Message from Margie Lal sent at 11/8/2019  9:33 AM CST -----  Regarding: REFERRALS REQUESTED  Contact: [phone]  Good Morning,    Pt called requesting immediate referrals be put in to see Dr. Tio Gongora for Vertigo and also Dr. Rahman-Neurology due to a recent visit last night at the emergency room. Was told that she had vertigo again and she has seen Dr. Kinsey back in 2016. Please contact pt back immediately at the number listed above.    Thank You,  [employer]

## 2019-11-13 ENCOUNTER — TELEPHONE (OUTPATIENT)
Dept: GASTROENTEROLOGY | Facility: CLINIC | Age: 44
End: 2019-11-13

## 2019-11-13 DIAGNOSIS — A04.8 H. PYLORI INFECTION: Primary | ICD-10-CM

## 2019-11-13 NOTE — TELEPHONE ENCOUNTER
MA spoke to pt,     Pt reporting stomach pain, pt pain level is a 9. Pt requesting to recheck stool H. Pylori. Pt stated she wasn't off antibiotics for four weeks only three weeks. Pt stated she's taking bentyl for pain and it's not working.     Pt requesting to be seen by Dr. Main only informed pt Dr. Main doesn't have any available apt as of now.     Pt stated she need to see Dr. Main.     ----- Message from Renu Ly sent at 11/13/2019 11:50 AM CST -----  Contact: Self  Pt would like a call back to get scheduled for an appointment @ [phone]

## 2019-11-14 ENCOUNTER — TELEPHONE (OUTPATIENT)
Dept: GASTROENTEROLOGY | Facility: CLINIC | Age: 44
End: 2019-11-14

## 2019-11-14 ENCOUNTER — TELEPHONE (OUTPATIENT)
Dept: UROLOGY | Facility: CLINIC | Age: 44
End: 2019-11-14

## 2019-11-14 DIAGNOSIS — N30.00 ACUTE CYSTITIS WITHOUT HEMATURIA: ICD-10-CM

## 2019-11-14 DIAGNOSIS — N30.10 IC (INTERSTITIAL CYSTITIS): Primary | ICD-10-CM

## 2019-11-14 NOTE — TELEPHONE ENCOUNTER
MA spoke to pt informed pt per Dr. Main - repeating the H pylori stool sample. Instructions given pt verbalize understanding. Offer pt appt on 12/2 at 2:30 pm when she come to  stool kit on 11/14/19.

## 2019-11-14 NOTE — TELEPHONE ENCOUNTER
----- Message from Shira Briones sent at 11/14/2019  1:17 PM CST -----  Contact: pt   Pt need to talk to Sabina about a problem  Pt would not share what the problem is.  Please call pt back.

## 2019-11-14 NOTE — TELEPHONE ENCOUNTER
Pt requesting a urine culture, orders placed. She states she has h. Pyloric and wants to know if this could affect her bladder

## 2019-11-14 NOTE — TELEPHONE ENCOUNTER
"Pt came by today stool kit given to patient. Patient, confirmed apt on  at 2:30 pm with Dr. Main and verbalize understanding of instructions given below;    Printed 1 label which include the pt's full name, , & MRN. Verified correct pt by having them state & spell their full name & ; compared against labels & lab requisition. Gathered the following supplies and placed in a bag for the pt's convenience: biohazard bag w/ pt's lab requisition in pocket, teal container w/ verified pt label placed on container's side, sterile tongue depressor, and clear collection "hat". Placed verified label onto teal topped container. Hand delivered the bag to pt. Explained that pt is to have a BM in the clear "hat" provided which is to placed inside the toilet. FORMED STOOL: (Using the wooden spatula provided, break of a piece of stool & place in the teal topped container.)  Securely fasten the top and place in the biohazard bag provided. Instructed pt to document date & time of stool collection on the stool collection slip. Stool sample is to be hand delivered to an [facility] lab appt desk for check-in. Explained that once the results are received, we will be in touch w/ the results.         "

## 2019-11-15 ENCOUNTER — OFFICE VISIT (OUTPATIENT)
Dept: NEUROLOGY | Facility: CLINIC | Age: 44
End: 2019-11-15
Payer: COMMERCIAL

## 2019-11-15 ENCOUNTER — LAB VISIT (OUTPATIENT)
Dept: LAB | Facility: HOSPITAL | Age: 44
End: 2019-11-15
Attending: INTERNAL MEDICINE
Payer: COMMERCIAL

## 2019-11-15 VITALS
HEART RATE: 88 BPM | SYSTOLIC BLOOD PRESSURE: 116 MMHG | HEIGHT: 63 IN | WEIGHT: 198 LBS | DIASTOLIC BLOOD PRESSURE: 78 MMHG | BODY MASS INDEX: 35.08 KG/M2

## 2019-11-15 DIAGNOSIS — G43.C1 INTRACTABLE PERIODIC HEADACHE SYNDROME: Primary | ICD-10-CM

## 2019-11-15 DIAGNOSIS — A04.8 H. PYLORI INFECTION: ICD-10-CM

## 2019-11-15 PROCEDURE — 3008F PR BODY MASS INDEX (BMI) DOCUMENTED: ICD-10-PCS | Mod: CPTII,S$GLB,, | Performed by: PSYCHIATRY & NEUROLOGY

## 2019-11-15 PROCEDURE — 87338 HPYLORI STOOL AG IA: CPT

## 2019-11-15 PROCEDURE — 99205 PR OFFICE/OUTPT VISIT, NEW, LEVL V, 60-74 MIN: ICD-10-PCS | Mod: 25,S$GLB,, | Performed by: PSYCHIATRY & NEUROLOGY

## 2019-11-15 PROCEDURE — 99999 PR PBB SHADOW E&M-EST. PATIENT-LVL III: CPT | Mod: PBBFAC,,, | Performed by: PSYCHIATRY & NEUROLOGY

## 2019-11-15 PROCEDURE — 96374 THER/PROPH/DIAG INJ IV PUSH: CPT | Mod: S$GLB,,, | Performed by: PSYCHIATRY & NEUROLOGY

## 2019-11-15 PROCEDURE — 99999 PR PBB SHADOW E&M-EST. PATIENT-LVL III: ICD-10-PCS | Mod: PBBFAC,,, | Performed by: PSYCHIATRY & NEUROLOGY

## 2019-11-15 PROCEDURE — 99205 OFFICE O/P NEW HI 60 MIN: CPT | Mod: 25,S$GLB,, | Performed by: PSYCHIATRY & NEUROLOGY

## 2019-11-15 PROCEDURE — 3008F BODY MASS INDEX DOCD: CPT | Mod: CPTII,S$GLB,, | Performed by: PSYCHIATRY & NEUROLOGY

## 2019-11-15 PROCEDURE — 96374 PR INJECTION,THERAP/PROPH/DIAGNOST, IV PUSH, INITIAL DRUG: ICD-10-PCS | Mod: S$GLB,,, | Performed by: PSYCHIATRY & NEUROLOGY

## 2019-11-15 RX ORDER — PROMETHAZINE HYDROCHLORIDE 50 MG/1
50 TABLET ORAL EVERY 12 HOURS PRN
Refills: 0 | COMMUNITY
Start: 2019-10-17 | End: 2023-12-12 | Stop reason: SDUPTHER

## 2019-11-15 RX ORDER — METOCLOPRAMIDE 10 MG/1
10 TABLET ORAL 2 TIMES DAILY PRN
Qty: 15 TABLET | Refills: 11 | Status: ON HOLD | OUTPATIENT
Start: 2019-11-15 | End: 2020-11-26

## 2019-11-15 RX ORDER — KETOROLAC TROMETHAMINE 30 MG/ML
60 INJECTION, SOLUTION INTRAMUSCULAR; INTRAVENOUS
Status: DISCONTINUED | OUTPATIENT
Start: 2019-11-15 | End: 2020-03-30 | Stop reason: CLARIF

## 2019-11-15 RX ORDER — DIAZEPAM 10 MG/1
TABLET ORAL
Qty: 2 TABLET | Refills: 0 | Status: SHIPPED | OUTPATIENT
Start: 2019-11-15 | End: 2020-01-28

## 2019-11-15 RX ORDER — PROMETHAZINE HYDROCHLORIDE 25 MG/ML
25 INJECTION, SOLUTION INTRAMUSCULAR; INTRAVENOUS
Status: DISCONTINUED | OUTPATIENT
Start: 2019-11-15 | End: 2020-03-30 | Stop reason: CLARIF
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
## 2019-11-15 NOTE — PATIENT INSTRUCTIONS
YOU WILL BE CONTACTED BY [facility] ABOUT AIMOVIG MONTHLY INJECTABLE FOR MIGRAINE    BEGIN SUPPLEMENTATION WITH MAGNESIUM 400MG DAILY    LIMIT FIORICET TO NO MORE THAN 3 TABS PER WEEK TO AVOID MEDICATION OVER USE (REBOUND) HEADACHE    TAKE REGLAN OR OVER THE COUNTER MEDICINE SUCH AS TYLENOL OR EXCEDRIN MIGRAINE FOR HEADACHES        Rebound Headache     Overuse of pain medications can lead to rebound headaches.   You use pain medicines called analgesics to treat your headaches. You are now having more frequent or intense headaches (rebound headaches). They are your bodys response to too much pain medicine. Prescription pain medicines can cause these headaches. But so can over-the-counter medicines like acetaminophen or ibuprofen. A drug that contains caffeine or butalbital is most likely to cause rebound headache.  Symptoms of rebound headache include:  · Mild to moderate headache for 15 or more days each month for 3 months or more  · Headache when you wake up that continues most of the day  · Headaches getting worse over time  · Need for more and more medicine to treat headaches  Rebound headaches are most often diagnosed by your symptoms and medicine history. You may need tests to rule out other causes of your headaches. In the emergency room, you may be given a non-analgesic pain medicine to treat the pain or stop future headaches.  Home care  Treatment involves stopping use of your pain medicines. Your healthcare provider can tell you how to safely do this. You may be able to stop right away. Or you may need to take less and less over time (taper off). This will depend on the medicines you have been taking. To do this, follow the schedule that your provider gives you. If you are taking pain medicines for other types of pain at the same time, your healthcare provider may need other specialists to participate in your care.  · For the first week or so after stopping, the headaches will likely get  worse. You may also have withdrawal symptoms. These often include nausea, vomiting, and trouble sleeping. You may be given a medicine to help relieve pain and withdrawal symptoms. Take this exactly as you have been told. It is vital to avoid taking daily pain medicine. If you do so, rebound headaches will continue.  · Caffeine can make rebound headaches worse. If you have caffeinated drinks every day, slowly cut your intake.  · Keep a written log of your headaches. This can help you and your healthcare provider track your progress.  · Be patient. It can take about 2 to 6 months to stop having rebound headaches.  · Once you have broken the headache cycle, be careful not to start it again. Work with your provider to make a treatment plan for headache pain that has low risk of causing rebound headaches.  · Relaxation can help lower tension and relieve pain. Try a massage, meditation, yoga, or other relaxation techniques. Or make time for a relaxing hobby that you enjoy.  Follow-up care  Follow up with your healthcare provider, or as advised.  When to seek medical advice  Call your healthcare provider right away if any of these occur:  · Fever of 100.4°F (38°C) or higher  · Headaches that wake you from sleep  · Repeated vomiting or visual problems that don't go away  · Headache with a stiff neck, rash, confusion, weakness, numbness, seizure (convulsion), or trouble talking  · Headache that starts after a head injury or fall  · A type of headache you have never had before  · Headache that gets worse despite rest and medicine  Date Last Reviewed: 11/20/2015  © 2377-2553 [employer]. [address] [place] [address], [place] [address]. All rights reserved. This information is not intended as a substitute for professional medical care. Always follow your healthcare professional's instructions.

## 2019-11-18 ENCOUNTER — TELEPHONE (OUTPATIENT)
Dept: PHARMACY | Facility: CLINIC | Age: 44
End: 2019-11-18

## 2019-11-19 RX ORDER — AMOXICILLIN AND CLAVULANATE POTASSIUM 875; 125 MG/1; MG/1
1 TABLET, FILM COATED ORAL 2 TIMES DAILY
Qty: 14 TABLET | Refills: 0 | Status: SHIPPED | OUTPATIENT
Start: 2019-11-19 | End: 2019-11-26

## 2019-11-19 NOTE — TELEPHONE ENCOUNTER
IC (interstitial cystitis)  -     Urine culture; Future; Expected date: 11/14/2019    Acute cystitis without hematuria  -     amoxicillin-clavulanate 875-125mg (AUGMENTIN) 875-125 mg per tablet; Take 1 tablet by mouth 2 (two) times daily. for 7 days  Dispense: 14 tablet; Refill: 0    eRxed to the pharmacy.  Please call and advise the patient to take the medication as given.

## 2019-11-20 ENCOUNTER — TELEPHONE (OUTPATIENT)
Dept: GASTROENTEROLOGY | Facility: CLINIC | Age: 44
End: 2019-11-20

## 2019-11-20 LAB — H PYLORI AG STL QL IA: NOT DETECTED

## 2019-11-20 NOTE — TELEPHONE ENCOUNTER
MA contact pt to inform pt per Dr. Main - Please let her know her stool sample is negative for H pylori.    Pt didn't answer left detail message to return call

## 2019-11-20 NOTE — TELEPHONE ENCOUNTER
MA spoke to pt informed pt per Dr. Main - Please let her know her stool sample is negative for H pylori.    Pt verbalize understanding and thank MA

## 2019-11-25 ENCOUNTER — HOSPITAL ENCOUNTER (OUTPATIENT)
Dept: RADIOLOGY | Facility: HOSPITAL | Age: 44
Discharge: HOME | End: 2019-11-25
Attending: PSYCHIATRY & NEUROLOGY
Payer: COMMERCIAL

## 2019-11-25 DIAGNOSIS — G43.C1 INTRACTABLE PERIODIC HEADACHE SYNDROME: ICD-10-CM

## 2019-11-25 PROCEDURE — A9585 GADOBUTROL INJECTION: HCPCS | Performed by: PSYCHIATRY & NEUROLOGY

## 2019-11-25 PROCEDURE — 70553 MRI BRAIN W WO CONTRAST: ICD-10-PCS | Mod: 26,,, | Performed by: RADIOLOGY

## 2019-11-25 PROCEDURE — 70553 MRI BRAIN STEM W/O & W/DYE: CPT | Mod: 26,,, | Performed by: RADIOLOGY

## 2019-11-25 PROCEDURE — 70553 MRI BRAIN STEM W/O & W/DYE: CPT | Mod: TC

## 2019-11-25 PROCEDURE — 25500020 PHARM REV CODE 255: Performed by: PSYCHIATRY & NEUROLOGY

## 2019-11-25 RX ORDER — GADOBUTROL 604.72 MG/ML
9 INJECTION INTRAVENOUS
Status: COMPLETED | OUTPATIENT
Start: 2019-11-25 | End: 2019-11-25

## 2019-11-25 RX ADMIN — GADOBUTROL 9 ML: 604.72 INJECTION INTRAVENOUS at 06:11

## 2019-12-02 ENCOUNTER — OFFICE VISIT (OUTPATIENT)
Dept: GASTROENTEROLOGY | Facility: CLINIC | Age: 44
End: 2019-12-02
Payer: COMMERCIAL

## 2019-12-02 VITALS
WEIGHT: 195 LBS | HEART RATE: 92 BPM | SYSTOLIC BLOOD PRESSURE: 114 MMHG | BODY MASS INDEX: 34.55 KG/M2 | HEIGHT: 63 IN | DIASTOLIC BLOOD PRESSURE: 80 MMHG

## 2019-12-02 DIAGNOSIS — R14.0 BLOATING: ICD-10-CM

## 2019-12-02 DIAGNOSIS — A04.8 H. PYLORI INFECTION: Primary | ICD-10-CM

## 2019-12-02 PROCEDURE — 99999 PR PBB SHADOW E&M-EST. PATIENT-LVL III: CPT | Mod: PBBFAC,,, | Performed by: INTERNAL MEDICINE

## 2019-12-02 PROCEDURE — 99214 PR OFFICE/OUTPT VISIT, EST, LEVL IV, 30-39 MIN: ICD-10-PCS | Mod: S$GLB,,, | Performed by: INTERNAL MEDICINE

## 2019-12-02 PROCEDURE — 99999 PR PBB SHADOW E&M-EST. PATIENT-LVL III: ICD-10-PCS | Mod: PBBFAC,,, | Performed by: INTERNAL MEDICINE

## 2019-12-02 PROCEDURE — 3008F BODY MASS INDEX DOCD: CPT | Mod: CPTII,S$GLB,, | Performed by: INTERNAL MEDICINE

## 2019-12-02 PROCEDURE — 99214 OFFICE O/P EST MOD 30 MIN: CPT | Mod: S$GLB,,, | Performed by: INTERNAL MEDICINE

## 2019-12-02 PROCEDURE — 3008F PR BODY MASS INDEX (BMI) DOCUMENTED: ICD-10-PCS | Mod: CPTII,S$GLB,, | Performed by: INTERNAL MEDICINE

## 2019-12-02 RX ORDER — AMITRIPTYLINE HYDROCHLORIDE 25 MG/1
25 TABLET, FILM COATED ORAL NIGHTLY
Qty: 30 TABLET | Refills: 3 | Status: SHIPPED | OUTPATIENT
Start: 2019-12-02 | End: 2020-04-03 | Stop reason: SDUPTHER

## 2019-12-02 NOTE — PATIENT INSTRUCTIONS
Low Fodmap Diet    1) Completeley restrict FODMAPS on diet for 6 weeks  2) After that reintroduce one food for 3 days at a time to see which foods you can tolerate  3) Space out meals and maintain a balanced diet     Purchase - The Complete Low Fodmap Diet: A Revolutionary Plan for Managing IBS and other Digestive Disorders by Mikaela Bonner PhD, Kenneth Miranda MD, Freddy Soriano MD   ($13.25 on Amazon.com)    Purchase - [employer] Low Fodmap Dania for smartphones    Watch: [employer] [employer] [employer] channel for advice    Consult: For personalized 6 weeks of coaching: [email or web address]    Fodmaps (fructo,oligo,mono saccharides and polyols) are contained in certain foods and can cause significant bloating in some people. Reducing these foods can reduce bloating. Start off by cutting out anything with high fructose corn syrup in the ingredients.        Foods suitable on a low-fodmap diet  fruit  banana, blueberry, boysenberry, canteloupe, cranberry, durian, grape,  grapefruit, honeydew melon, kiwifruit, lemon,lime, mandarin, orange,  passionfruit, pawpaw, raspberry, rhubarb, rockmelon, star anise,  strawberry, tangelo  vegetables  alfalfa, artichoke, bamboo shoots, bean shoots, bok bisi,  carrot, celery, choko, bisi sum, endive, maricarmen, green beans,  lettuce, olives, parsnip, potato, pumpkin, red capsicum (bell pepper),  silver beet, spinach, summer squash (yellow), swede, sweet potato, taro, tomato,  turnip, yam, zucchini   herbs  basil, chili, coriander, maricarmen, lemongrass,   marjoram, mint, oregano, parsley, rosemary, thyme  milk  lactose-free milk, oat milk*, rice milk, soy milk*  cheeses  hard cheeses, and brie and camembert  yoghurt  lactose-free varieties  ice-cream substitutes  gelati, sorbet  butter substitutes  olive oilgrain foods  cereals  gluten-free bread or cereal products  bread  100% spelt bread  Rice, oats, polenta, other  arrowroot, millet, psyllium, quinoa, sorgum,  tapioca  sweeteners  sugar* (sucrose), glucose, artificial sweeteners not ending in -ol  honey substitutes  dorado syrup*, maple syrup*, molasses, treacle  *small quantities  *check for additives  Note: if fruit is dried, eat in small quantities    excess fructose lactose fructans galactans polyols      Eliminate foods containing fodmaps  fruit  apple, apricot, avocado, blackberry, cherry, lychee, nashi, nectarine, peach, pear, plum,  prune, watermelon, shannan, tinned fruit in natural juice,   sweeteners  sorbitol (420)  mannitol (421)  isomalt (953)  maltitol (965)  xylitol (967)  legumes  baked beans, chickpeas, kidney beans, lentils  vegetables  asparagus, beetroot, broccoli, brussels sprouts, cabbage, eggplant, fennel, garlic,  emily, okra, onion (all), shallots, spring onion, cauliflower, green capsicum (bell pepper), mushroom, sweet corn  cereals  wheat and rye, in large amounts eg. Bread, crackers, cookies, couscous, pasta  fruit  custard apple, persimmon, watermelon  miscellaneous  chicory, dandelion, inulin  sweeteners  fructose, high fructose corn syrup  large total fructose dose  concentrated fruit sources, large servings of fruit, dried fruit, fruit juice  honey  corn syrup, fruisana  milk  milk from cows, goats or sheep, custard, ice cream, yoghurt  cheeses  soft unripened cheeses eg. cottage, cream, mascarpone    Additional FODMAPs Diet Reading List    IBS-Free At Last!: Second Edition: Change Your Carbs, Change Your Life with the FODMAP Elimination Diet, 2 nd edition by Mary Grace Harrington MS, RD     2012 copyright; [employer].   ISBN: 296-3-8123877-2-1    The Complete Idiots Guide To Living Well With IBS by Amita Flores RD, SOFIN  [facility], 2010    The FODMAPs Approach:--Minimize Consumption of Fermentable Carbs to Manage Functional Gut Disorder Symptoms  by Amita Flores RD, SOFIN article found in  Todays Dietitian, vol. 12, no. 8, p. 30    The Inside Tract: Your Good Gut Guide To Great  Digestive Health by Alvaro Mortensen MD and Criselda Godoy, MS, RD, LDN  2011 copyright, [employer]   ISBN: [phone]    List assembled by: Savannah Draper, MS, RD, LDN, CDE  [facility]  Last Updated: 7/13/12

## 2019-12-02 NOTE — PROGRESS NOTES
[facility] Consultation Note    Reason for Consult:  The primary encounter diagnosis was H. pylori infection. A diagnosis of Bloating was also pertinent to this visit.    PCP:   Nick Reeves Jr       Referring MD:  No referring provider defined for this encounter.    Initial HPI:  This is a 44 y.o. female here to follow-up on her abdominal pain  At her last visit she was treated for 2nd time for H pylori  Given Flagyl, tetracycline, bismuth, ppi  Initially treated with clarithromycin, amoxicillin, and omeprazole    Interval history 2019:    Diet: Lactose:  avoid   Fructose: avoids acidic fruits due to IC   (apples, cherries, mangoes, watermelon and pears,fruit juices, dried fruits and canned fruits,asparagus, artichoke and sugar snap peas, honey)   Chewing Gum: Chews always   Carbonated beverages: no     International Travel: no  Celiac: Normal SB biopsies  Antibiotics History: for H pylori, for urinary infections  Medications (OCP/PPI/Steroids/NSAIDs): PPI  Activity level: Normal  Constipation: none    Was on elavil in the past      ROS:  Constitutional: No fevers, chills, No weight loss  ENT: No allergies  CV: No chest pain  Pulm: No cough, No shortness of breath  Ophtho: No vision changes  GI: see HPI  Derm: No rash  Heme: No lymphadenopathy, No bruising  MSK: + crush injury on foot treated with percocets takes it rarely ()  : + interstitial cystitis, + BV  Endo: No hot or cold intolerance  Neuro: + migraines  Psych: No anxiety, No depression    Medical History:  has a past medical history of Anxiety, Dizziness, GERD (gastroesophageal reflux disease), H. pylori infection, Interstitial cystitis, and Migraine headache.    Surgical History:  has a past surgical history that includes Hysterectomy;  section, classic; Oophorectomy; Colonoscopy (N/A, 2016); Tonsillectomy; Injection of botulinum toxin type A (N/A, 2018); Instillation of urinary bladder (N/A,  8/1/2018); Esophagogastroduodenoscopy (N/A, 3/4/2019); Breast surgery; Cystoscopy with hydrodistension of bladder (N/A, 3/13/2019); Instillation of urinary bladder (N/A, 3/13/2019); Injection of botulinum toxin type A (N/A, 3/13/2019); and Upper gastrointestinal endoscopy.      Review of patient's allergies indicates:   Allergen Reactions    Bactrim [sulfamethoxazole-trimethoprim] Other (See Comments)     headache    Codeine Other (See Comments)     Other reaction(s): Vomiting       Medication List with Changes/Refills   New Medications    AMITRIPTYLINE (ELAVIL) 25 MG TABLET    Take 1 tablet (25 mg total) by mouth every evening. Take 1/2 tablet each night first week, then increase to 1 tablet each night   Current Medications    BUTALBITAL-ACETAMINOPHEN-CAFFEINE -40 MG (FIORICET, ESGIC) -40 MG PER TABLET    Take 1-2 tablets by mouth every 6 (six) hours as needed for Headaches.    DIAZEPAM (VALIUM) 10 MG TAB    Take 1-2 tabs at check in for MRI    ERENUMAB-AOOE 140 MG/ML ATIN    Inject 140 mg into the skin every 28 days.    FLOMAX 0.4 MG CAP    Take 1 capsule (0.4 mg total) by mouth once daily. Brand name only    HYDROXYZINE HCL (ATARAX) 25 MG TABLET    Take 1 tablet (25 mg total) by mouth every evening.    METOCLOPRAMIDE HCL (REGLAN) 10 MG TABLET    Take 1 tablet (10 mg total) by mouth 2 (two) times daily as needed (Headache and nausea).    OMEPRAZOLE (PRILOSEC) 40 MG CAPSULE    Take 1 capsule (40 mg total) by mouth every morning.    OXYBUTYNIN (DITROPAN-XL) 10 MG 24 HR TABLET    Take 1 tablet (10 mg total) by mouth every evening.    OXYCODONE-ACETAMINOPHEN (PERCOCET)  MG PER TABLET    Take 1 tablet by mouth every 12 (twelve) hours as needed.    PROMETHAZINE (PHENERGAN) 12.5 MG TAB    Take 1 tablet (12.5 mg total) by mouth every 6 (six) hours as needed (nausea).    PROMETHAZINE (PHENERGAN) 50 MG TABLET    Take 50 mg by mouth every 12 (twelve) hours as needed.         Objective Findings:    Vital  "Signs:  /80 (BP Location: Left arm, Patient Position: Sitting, BP Method: Medium (Automatic))   Pulse 92   Ht 5' 3" (1.6 m)   Wt 88.5 kg (195 lb)   LMP 08/26/2008   BMI 34.54 kg/m²   Body mass index is 34.54 kg/m².    Physical Exam:  General Appearance: Well appearing in no acute distress  Head:   Normocephalic, without obvious abnormality  Eyes:    No scleral icteru  ENT: Neck supple, Lips, mucosa, and tongue normal; teeth and gums normal  Lungs: CTA bilaterally in anterior and posterior fields, no wheezes, no crackles.  Heart:  Regular rate and rhythm, S1, S2 normal, no murmurs heard  Abdomen: Soft, epigastric tenderness, no guarding or rebound tenderness non distended with positive bowel sounds in all four quadrants.   Extremities: noedema  Skin: No rash  Neurologic: AAO x 3      Labs:  Lab Results   Component Value Date    WBC 9.03 03/04/2019    HGB 12.9 03/04/2019    HCT 39.5 03/04/2019     03/04/2019    CHOL 211 (H) 08/03/2007    TRIG 72 08/03/2007    HDL 94 (H) 08/03/2007    ALT 27 03/04/2019    AST 24 03/04/2019     03/04/2019    K 3.6 03/04/2019     03/04/2019    CREATININE 0.8 03/04/2019    BUN 11 03/04/2019    CO2 27 03/04/2019    TSH 1.5 08/03/2007    INR 0.9 09/08/2016       H pylori: Results for PEACE JUAREZ (MRN [id number]) as of 12/2/2019 08:55   Ref. Range 6/5/2019 07:55 9/19/2019 13:26 11/15/2019 10:33   H. Pylori Antigen, Stool Latest Ref Range: Not detected  Detected (A) Not detected Not detected       Imaging:  CT 2016 - wnl  U/s 7/19 - wnl      Endoscopy:    03/04/2019 EGD- Gastric inlet patch seen in upper esophagus                        - A single lesion suspicious for aberrant pancreas                         was found in the stomach. Biopsied. Treated with                         bipolar cautery for post biopsy bleeding.                        - Normal examined duodenum. Biopsied.                        - Biopsies were taken with a cold forceps for            "              Helicobacter pylori testing.  -biopsies were positive for H pylori, +intestinal metaplasia    Colon 2016 - wnl    Assessment:  1. H. pylori infection    2. Bloating            Recommendations:  1.  Check stool antigen to confirm eradication - negative x 2  2.  Abdominal ultrasound to rule out gallstones - normal  3.  Will resume her elavil at lower dose of 25 mg as her GI symptoms all got worse off of it and she likely has IBS with her IC and migraine history  4. Low fodmap diet for bloating  5. If still bloated after these issue consider breath testing for SIBO    Follow up in about 4 months (around 4/2/2020).      Order summary:  Orders Placed This Encounter    amitriptyline (ELAVIL) 25 MG tablet         Thank you so much for allowing me to participate in the care of Elana Main MD

## 2019-12-02 NOTE — TELEPHONE ENCOUNTER
Phoned [employer] in regard to prior authorization for AIMOVIG submitted verbally to insurance company for review on 12/2/2019 CASE ID: [id number] FLC

## 2019-12-04 NOTE — TELEPHONE ENCOUNTER
DOCUMENTATION ONLY:   Prior authorization for AIMOVIG approved from 11/2/2019 to 12/01/2020.     Case ID# [id number]    Co-pay: $0    Patient Assistance IS NOT required.     Forward to clinical pharmacist for consult & shipment. FLC

## 2019-12-20 ENCOUNTER — TELEPHONE (OUTPATIENT)
Dept: GASTROENTEROLOGY | Facility: CLINIC | Age: 44
End: 2019-12-20

## 2019-12-20 NOTE — TELEPHONE ENCOUNTER
MA spoke to pt,     Pt will like a message to be sent to [facility] staff pt son was seen today and forgot to ask for work note.     Message sent to [facility] staff     ----- Message from Giselle Ruiz sent at 12/20/2019 10:50 AM CST -----  Contact: pt#[phone]  Elana Gonzales  wants to speak with nurse and she did not want to leave reason

## 2019-12-26 ENCOUNTER — TELEPHONE (OUTPATIENT)
Dept: UROLOGY | Facility: CLINIC | Age: 44
End: 2019-12-26

## 2019-12-26 DIAGNOSIS — R39.89 BLADDER PAIN: Primary | ICD-10-CM

## 2019-12-26 RX ORDER — CEPHALEXIN 500 MG/1
500 CAPSULE ORAL EVERY 12 HOURS
Qty: 14 CAPSULE | Refills: 0 | Status: SHIPPED | OUTPATIENT
Start: 2019-12-26 | End: 2020-01-02

## 2019-12-26 NOTE — PROGRESS NOTES
Patient reports straining to void and dysuria.  Notified of u/a. Moderate bacteria seen.  Will send keflex to pharmacy and await culture results.

## 2019-12-26 NOTE — TELEPHONE ENCOUNTER
----- Message from Maria Del Carmen Wen sent at 12/26/2019  9:53 AM CST -----  Contact: Pt  Reason: Pt calling regards to bladder issues and ask for someone to give her a call.    Communication: [phone]

## 2019-12-27 ENCOUNTER — TELEPHONE (OUTPATIENT)
Dept: PHARMACY | Facility: CLINIC | Age: 44
End: 2019-12-27

## 2019-12-27 NOTE — TELEPHONE ENCOUNTER
Call to complete initial consult for Aimovig. Patient requested callback for later on today. Verified name and .    Orville Boone, PharmD  Clinical Pharmacist  [facility]  P: [phone]

## 2019-12-28 NOTE — TELEPHONE ENCOUNTER
"Initial Aimovig consult completed on . Aimovig 140mg will be shipped on  to arrive at patient's home on 1/3 via [employer]. $0.00 copay. Patient intends to start Aimovig on 1/3. Address confirmed. Confirmed 2 patient identifiers - name and . Therapy Appropriate.    Mrs. Gonzales suffers migraines daily, lasting for hours and having a pain range from 8-9/10. She has been having migraines for "years". She states she has a major trigger of pork. She rates her QoL a /10.    Indication: Migraine prophylaxis   Goals of treatment: Reduction in migraine frequency, duration, and/or intensity (may take 3 months to reach full efficacy)    --Injection experience: None, comfortable with self injection  Informed patient on online injection video on  website. [[email or web address]    Storage: keep refrigerated, do not freeze. Take out of the refrigerator 30-60 minutes prior to injection.    Counseled patient on administration directions:  - Inject 140mg into the skin every 4 weeks.   - Wash hands before and after injection.  - Monthly RX will come with gauze, bandaids, and alcohol swabs.  - Patient may inject in either the tops of the thighs, abdomen- but at least 2 inches away from belly button, or the outer part of upper arm (with assistance).   - Patient is to wipe down the injection site with the alcohol pad, wait to dry.    - Remove white cap from pen  - Gently squeeze OR stretch the area of the cleaned skin and hold it firmly.  Place the pen flat against the skin then push down on the purple button and release - there will be an initial click; in 10-15 seconds you will hear a second click and the window will go from from clear to yellow, indicating injection is complete.  - It is recommended to rotate injection sites; never inject into irritated skin.   - Patient will use sharps container; once full, per [place] law, she/ he may lock the sharps container and place in trash. Pharmacy will replace the " sharps at no additional charge.    Patient was counseled on possible side effects:  - Injection site reaction: redness, soreness, itching, bruising  - constipation  - Muscle cramps (?2%)     Med rec completed. No known drug interactions with Aimovig.    Advised to keep a calendar to stay compliant. Consultation included the importance of compliance and of keeping all follow up appointments. Patient advised of [facility] hours, refill procedures, clinical followups, and availability of on-call pharmacist Patient understands to report any medication changes to [facility] and provider. All questions answered and addressed to patients satisfaction. RPh to touch base with patient in 7 days and [facility] to contact patient in 3 weeks for refills.      Orville Boone, PharmD  Clinical Pharmacist  [facility]  P: [phone]    [facility] sent 12/27 @ 7:01PM

## 2019-12-29 ENCOUNTER — TELEPHONE (OUTPATIENT)
Dept: UROLOGY | Facility: CLINIC | Age: 44
End: 2019-12-29

## 2019-12-29 DIAGNOSIS — N30.00 ACUTE CYSTITIS WITHOUT HEMATURIA: Primary | ICD-10-CM

## 2019-12-29 RX ORDER — AMOXICILLIN AND CLAVULANATE POTASSIUM 875; 125 MG/1; MG/1
1 TABLET, FILM COATED ORAL 2 TIMES DAILY
Qty: 14 TABLET | Refills: 0 | Status: SHIPPED | OUTPATIENT
Start: 2019-12-29 | End: 2020-01-05

## 2019-12-30 DIAGNOSIS — A49.9 BACTERIAL UTI: Primary | ICD-10-CM

## 2019-12-30 DIAGNOSIS — N39.0 BACTERIAL UTI: Primary | ICD-10-CM

## 2019-12-30 RX ORDER — CIPROFLOXACIN 500 MG/1
500 TABLET ORAL 2 TIMES DAILY
Qty: 14 TABLET | Refills: 0 | Status: SHIPPED | OUTPATIENT
Start: 2019-12-30 | End: 2020-01-06

## 2019-12-30 NOTE — PROGRESS NOTES
Patient inquired about having to take keflex and augmentin.  Reviewed urine culture.  2 bacteria noted.  Sent cipro since both bacteria were not sensitive to keflex or augmentin.  She was instructed to stop keflex and start cipro to treat both bacteria.  She will call and give an update after completion.

## 2020-01-06 DIAGNOSIS — R33.9 INCOMPLETE BLADDER EMPTYING: ICD-10-CM

## 2020-01-06 RX ORDER — TAMSULOSIN HYDROCHLORIDE 0.4 MG/1
CAPSULE ORAL
Qty: 90 CAPSULE | Refills: 3 | Status: SHIPPED | OUTPATIENT
Start: 2020-01-06 | End: 2020-08-10

## 2020-01-24 ENCOUNTER — TELEPHONE (OUTPATIENT)
Dept: PHARMACY | Facility: CLINIC | Age: 45
End: 2020-01-24

## 2020-01-28 ENCOUNTER — OFFICE VISIT (OUTPATIENT)
Dept: UROLOGY | Facility: CLINIC | Age: 45
End: 2020-01-28
Payer: COMMERCIAL

## 2020-01-28 VITALS
HEART RATE: 92 BPM | WEIGHT: 202 LBS | BODY MASS INDEX: 35.79 KG/M2 | DIASTOLIC BLOOD PRESSURE: 81 MMHG | HEIGHT: 63 IN | SYSTOLIC BLOOD PRESSURE: 122 MMHG

## 2020-01-28 DIAGNOSIS — R39.82 CHRONIC BLADDER PAIN: ICD-10-CM

## 2020-01-28 DIAGNOSIS — N30.10 IC (INTERSTITIAL CYSTITIS): Primary | ICD-10-CM

## 2020-01-28 PROCEDURE — 99999 PR PBB SHADOW E&M-EST. PATIENT-LVL IV: CPT | Mod: PBBFAC,,, | Performed by: UROLOGY

## 2020-01-28 PROCEDURE — 99214 PR OFFICE/OUTPT VISIT, EST, LEVL IV, 30-39 MIN: ICD-10-PCS | Mod: 57,S$GLB,, | Performed by: UROLOGY

## 2020-01-28 PROCEDURE — 3008F PR BODY MASS INDEX (BMI) DOCUMENTED: ICD-10-PCS | Mod: CPTII,S$GLB,, | Performed by: UROLOGY

## 2020-01-28 PROCEDURE — 99214 OFFICE O/P EST MOD 30 MIN: CPT | Mod: 57,S$GLB,, | Performed by: UROLOGY

## 2020-01-28 PROCEDURE — 3008F BODY MASS INDEX DOCD: CPT | Mod: CPTII,S$GLB,, | Performed by: UROLOGY

## 2020-01-28 PROCEDURE — 99999 PR PBB SHADOW E&M-EST. PATIENT-LVL IV: ICD-10-PCS | Mod: PBBFAC,,, | Performed by: UROLOGY

## 2020-01-28 RX ORDER — PROMETHAZINE HYDROCHLORIDE 25 MG/1
TABLET ORAL
Refills: 0 | COMMUNITY
Start: 2019-11-08 | End: 2020-01-28

## 2020-01-28 RX ORDER — AZITHROMYCIN 250 MG/1
TABLET, FILM COATED ORAL
COMMUNITY
Start: 2020-01-24 | End: 2020-03-30 | Stop reason: CLARIF

## 2020-01-28 RX ORDER — ALPRAZOLAM 2 MG/1
2 TABLET ORAL NIGHTLY PRN
Qty: 30 TABLET | Refills: 0 | Status: SHIPPED | OUTPATIENT
Start: 2020-01-28 | End: 2020-02-24 | Stop reason: SDUPTHER

## 2020-01-28 RX ORDER — MECLIZINE HYDROCHLORIDE 25 MG/1
TABLET ORAL 2 TIMES DAILY PRN
Refills: 0 | COMMUNITY
Start: 2019-11-14

## 2020-01-28 RX ORDER — FAMOTIDINE 40 MG/1
40 TABLET, FILM COATED ORAL NIGHTLY PRN
Qty: 30 TABLET | Refills: 11 | Status: SHIPPED | OUTPATIENT
Start: 2020-01-28 | End: 2020-05-28

## 2020-01-28 RX ORDER — PHENAZOPYRIDINE HYDROCHLORIDE 200 MG/1
TABLET, FILM COATED ORAL
COMMUNITY
Start: 2019-12-31 | End: 2020-03-22

## 2020-01-28 NOTE — LETTER
January 28, 2020      Nick Reeves Jr., MD  [address] [facility] [address]           [facility] - Urology 4th Floor  [address]  [place] [address]  Phone: [phone]          Patient: Elana Gonzales   MR Number: [id number]   YOB: 1975   Date of Visit: 1/28/2020       Dear Dr. Nick Reeves Jr.:    Thank you for referring Elana Gonzales to me for evaluation. Attached you will find relevant portions of my assessment and plan of care.    If you have questions, please do not hesitate to call me. I look forward to following Elana Gonzales along with you.    Sincerely,    Yong Anguiano MD    Enclosure  CC:  No Recipients    If you would like to receive this communication electronically, please contact [email or web address] or [phone] to request more information on [employer] access.    For providers and/or their staff who would like to refer a patient to [facility], please contact us through our one-stop-shop provider referral line, [facility], at [phone].    If you feel you have received this communication in error or would no longer like to receive these types of communications, please e-mail [email or web address]

## 2020-01-28 NOTE — PROGRESS NOTES
"CC: bladder pain, s/p cysto hydrodistention and botox injection on 8/16/17    CHIEF COMPLAINT:     Mrs. Gonzales is a 42 y.o. female presenting for bladder pain.    PRESENTING ILLNESS:     Elana Gonzales is a 42 y.o. female with a PMH of IC who presents for possible IC flare up.  C/o bladder pain 8 to 9 out of 10 in pain scale for the last few weeks.  Pyridium helps a bit.   Her frequency urgency got better after previous botox injection.  So far OAB symptoms are not bothersome to her.  She is under stress especially due to her kids.    Has been on flomax, atarax, and oxybutynin xl.  Reports that her urine smells funny along with " bladder pressure"    s/p cysto hydrodistention and botox injection on 3/13/19.    Procedure(s) Performed:   1.  Cystoscopy with hydrodistension  2.  botox intravesical injection 100 units     Findings:   1.  Normal bladder mucosa with no lesions.  UOs in normal anatomic position  2.  100 U of Botox injected throughout detrusor  3.  hydrodistention with 700 cc and 700 cc capacity with terminal hematuria and glomerulations    Unable to take Elmiron because it makes her more difficult to urinate.  Elavil made her "pass out" and thus she stopped it.    Denies any fever or chills.     REVIEW OF SYSTEMS:  Constitutional: Negative for fever and chills.   HENT: Negative for hearing loss.   Eyes: Negative for visual disturbance.   Respiratory: Negative for shortness of breath.   Cardiovascular: Negative for chest pain.   Gastrointestinal: Positive for nausea.   Negative for  vomiting, and constipation.   Genitourinary:  Negative for urgency, frequency, difficulty urinating, nocturia, incontinence, dysuria, hematuria, intermittency, hesitancy, incomplete bladder emptying, and decreased urine volume.   Neurological: Positive for dizziness.   Hematological: Does not bruise/bleed easily.   Psychiatric/Behavioral: Negative for confusion.      PATIENT HISTORY:          Past Medical History:   Diagnosis " Date    Anxiety      Dizziness      Interstitial cystitis      Migraine headache                 Past Surgical History:   Procedure Laterality Date     SECTION, CLASSIC        COLONOSCOPY N/A 2016     Procedure: COLONOSCOPY;  Surgeon: Phuong Carrillo MD;  Location: [facility];  Service: Endoscopy;  Laterality: N/A;    HYSTERECTOMY        OOPHORECTOMY        TONSILLECTOMY             No family history on file.     Social History   Social History            Social History    Marital status:        Spouse name: N/A    Number of children: N/A    Years of education: N/A          Occupational History    Not on file.            Social History Main Topics    Smoking status: Never Smoker    Smokeless tobacco: Never Used    Alcohol use No    Drug use: No    Sexual activity: Yes       Partners: Male           Other Topics Concern    Not on file          Social History Narrative    No narrative on file            Allergies:  Bactrim [sulfamethoxazole-trimethoprim]; Codeine; and Zofran [ondansetron hcl (pf)]     Medications:     Current Outpatient Prescriptions:     amitriptyline (ELAVIL) 25 MG tablet, Take 1 tablet (25 mg total) by mouth nightly as needed for Insomnia., Disp: 30 tablet, Rfl: 11    butalbital-acetaminophen-caffeine -40 mg (FIORICET, ESGIC) -40 mg per tablet, Take 1-2 tablets by mouth every 6 (six) hours as needed for Headaches., Disp: 50 tablet, Rfl: 1    estradiol (ESTRACE) 1 MG tablet, Take 1 mg by mouth once daily. , Disp: , Rfl:     FLOMAX 0.4 mg Cp24, Take 1 capsule (0.4 mg total) by mouth once daily., Disp: 30 capsule, Rfl: 11    hydrOXYzine HCl (ATARAX) 25 MG tablet, Take 1 tablet (25 mg total) by mouth every evening., Disp: 30 tablet, Rfl: 11    oxybutynin (DITROPAN) 5 MG Tab, TAKE 1 TABLET BY MOUTH TWO TIMES DAILY, Disp: 60 tablet, Rfl: 12    amitriptyline (ELAVIL) 50 MG tablet, Take 1 tablet (50 mg total) by mouth every evening., Disp: 30  tablet, Rfl: 11    trospium (SANCTURA) 20 mg Tab tablet, Take 1 tablet (20 mg total) by mouth 2 (two) times daily. Take it in an empty stomach, Disp: 60 tablet, Rfl: 11     PHYSICAL EXAMINATION:     Constitutional: She is oriented to person, place, and time. She appears well-developed and well-nourished.  She is in no apparent distress.  Neck: No tracheal deviation present.      Cardiovascular: Normal rate.     Pulmonary/Chest: Effort normal. No respiratory distress.      Abdominal:  She exhibits no distension.  There is no CVA tenderness.      Lymphadenopathy:          Right: No supraclavicular adenopathy present.        Left: No supraclavicular adenopathy present.    Neurological: She is alert and oriented to person, place, and time.      Skin: Skin is warm and dry.      Extremities: No pitting edema noted in lower extremities bilaterally  Psych: Cooperative with normal affect.     Physical Exam   Constitutional: She is oriented to person, place, and time. She appears well-developed and well-nourished. No distress.   HENT:   Head: Normocephalic and atraumatic.   Right Ear: External ear normal.   Left Ear: External ear normal.   Nose: Nose normal.   Eyes: Conjunctivae and EOM are normal. Pupils are equal, round, and reactive to light. No scleral icterus.   Neck: Normal range of motion. Neck supple. No JVD present. No tracheal deviation present. No thyromegaly present.   Cardiovascular: Normal rate, regular rhythm, normal heart sounds and intact distal pulses.  Exam reveals no gallop and no friction rub.    No murmur heard.  Pulmonary/Chest: Effort normal and breath sounds normal. No respiratory distress. She has no wheezes.   Abdominal: Soft. Bowel sounds are normal. She exhibits no distension and no mass. There is no tenderness. There is no rebound and no guarding.   Genitourinary: Vagina normal and uterus normal. No vaginal discharge found.   Musculoskeletal: Normal range of motion. She exhibits no edema,  tenderness or deformity.   Lymphadenopathy:     She has no cervical adenopathy.   Neurological: She is alert and oriented to person, place, and time.   Skin: Skin is warm and dry. She is not diaphoretic.     Psychiatric: She has a normal mood and affect. Her behavior is normal. Thought content normal.            LABS:    U/a: 1.015, pH 5,clear     IMPRESSION:     IC (interstitial cystitis)  -     famotidine (PEPCID) 40 MG tablet; Take 1 tablet (40 mg total) by mouth nightly as needed (bladder pain).  Dispense: 30 tablet; Refill: 11  -     ALPRAZolam (XANAX) 2 MG Tab; Take 1 tablet (2 mg total) by mouth nightly as needed.  Dispense: 30 tablet; Refill: 0    Chronic bladder pain  -     ALPRAZolam (XANAX) 2 MG Tab; Take 1 tablet (2 mg total) by mouth nightly as needed.  Dispense: 30 tablet; Refill: 0         Diagnoses and all orders for this visit:  Diagnoses and all orders for this visit:    IC (interstitial cystitis)  -     famotidine (PEPCID) 40 MG tablet; Take 1 tablet (40 mg total) by mouth nightly as needed (bladder pain).  -     ALPRAZolam (XANAX) 2 MG Tab; Take 1 tablet (2 mg total) by mouth nightly as needed.    Chronic bladder pain  -     ALPRAZolam (XANAX) 2 MG Tab; Take 1 tablet (2 mg total) by mouth nightly as needed.         PLAN:  I reviewed her MEDS and explained the purpose of each one.  Continue flomax, hydroxyzine, oxybutynin xl.  Start Pepcid in order to achieve complete anti-histamine effects.    Continue IC smart diet.  Consider drinking alkaline water, Evamor instead of other water.  Baking soda water prn recommended.  She wanted to have a repeat cysto with hydrodistention and botox. Will schedule her at the end of March.     I spent 25 minutes with the patient of which more than half was spent in direct consultation with the patient in regards to our treatment and plan.     Follow up:  Follow up in about 8 weeks (around 3/25/2020), or cysto, hydrodistention, bladder instillation, botox injection  100 untis.

## 2020-01-29 ENCOUNTER — TELEPHONE (OUTPATIENT)
Dept: UROLOGY | Facility: CLINIC | Age: 45
End: 2020-01-29

## 2020-01-29 DIAGNOSIS — N32.81 OAB (OVERACTIVE BLADDER): ICD-10-CM

## 2020-01-29 DIAGNOSIS — N30.10 IC (INTERSTITIAL CYSTITIS): Primary | ICD-10-CM

## 2020-01-29 DIAGNOSIS — R39.82 CHRONIC BLADDER PAIN: ICD-10-CM

## 2020-01-29 NOTE — TELEPHONE ENCOUNTER
IC (interstitial cystitis)  -     Case Request Operating Room: CYSTOSCOPY, WITH BLADDER HYDRODISTENSION, INSTILLATION, BLADDER, CYSTOSCOPY,WITH BOTULINUM TOXIN INJECTION 100 UNITS    OAB (overactive bladder)  -     Case Request Operating Room: CYSTOSCOPY, WITH BLADDER HYDRODISTENSION, INSTILLATION, BLADDER, CYSTOSCOPY,WITH BOTULINUM TOXIN INJECTION 100 UNITS    Chronic bladder pain  -     Case Request Operating Room: CYSTOSCOPY, WITH BLADDER HYDRODISTENSION, INSTILLATION, BLADDER, CYSTOSCOPY,WITH BOTULINUM TOXIN INJECTION 100 UNITS

## 2020-02-21 ENCOUNTER — TELEPHONE (OUTPATIENT)
Dept: PHARMACY | Facility: CLINIC | Age: 45
End: 2020-02-21

## 2020-02-24 ENCOUNTER — TELEPHONE (OUTPATIENT)
Dept: UROLOGY | Facility: CLINIC | Age: 45
End: 2020-02-24

## 2020-02-24 DIAGNOSIS — R39.82 CHRONIC BLADDER PAIN: ICD-10-CM

## 2020-02-24 DIAGNOSIS — N30.10 IC (INTERSTITIAL CYSTITIS): ICD-10-CM

## 2020-02-24 RX ORDER — ALPRAZOLAM 2 MG/1
2 TABLET ORAL NIGHTLY PRN
Qty: 30 TABLET | Refills: 1 | Status: SHIPPED | OUTPATIENT
Start: 2020-02-24 | End: 2020-04-20 | Stop reason: SDUPTHER

## 2020-02-24 NOTE — TELEPHONE ENCOUNTER
----- Message from Hector Abad LPN sent at 2/24/2020  2:15 PM CST -----  Contact: Elana   teL:   [phone]       ----- Message -----  From: Ebonie Artis  Sent: 2/24/2020   2:09 PM CST  To: Silvio [facility] Staff    Caller says she wants a refill for the zanax.  Has no refills.   Will be out of this tomorrow.    [facility]:   [phone]

## 2020-03-13 DIAGNOSIS — N32.81 OAB (OVERACTIVE BLADDER): ICD-10-CM

## 2020-03-13 RX ORDER — OXYBUTYNIN CHLORIDE 10 MG/1
TABLET, EXTENDED RELEASE ORAL
Qty: 30 TABLET | Refills: 2 | Status: SHIPPED | OUTPATIENT
Start: 2020-03-13 | End: 2020-03-13 | Stop reason: SDUPTHER

## 2020-03-13 RX ORDER — OXYBUTYNIN CHLORIDE 10 MG/1
10 TABLET, EXTENDED RELEASE ORAL NIGHTLY
Qty: 30 TABLET | Refills: 5 | Status: SHIPPED | OUTPATIENT
Start: 2020-03-13 | End: 2020-06-11

## 2020-03-13 NOTE — TELEPHONE ENCOUNTER
----- Message from Vianey Jensen MA sent at 3/13/2020  3:03 PM CDT -----  Contact: [phone]     Pt asked if you could re-send oxybutynin (DITROPAN-XL)  To [facility]. They told her they did not receive it.

## 2020-03-18 ENCOUNTER — TELEPHONE (OUTPATIENT)
Dept: PHARMACY | Facility: CLINIC | Age: 45
End: 2020-03-18

## 2020-03-22 RX ORDER — PHENAZOPYRIDINE HYDROCHLORIDE 200 MG/1
TABLET, FILM COATED ORAL
Qty: 30 TABLET | Refills: 9 | Status: SHIPPED | OUTPATIENT
Start: 2020-03-22 | End: 2021-04-21

## 2020-03-23 ENCOUNTER — TELEPHONE (OUTPATIENT)
Dept: UROLOGY | Facility: CLINIC | Age: 45
End: 2020-03-23

## 2020-03-23 NOTE — TELEPHONE ENCOUNTER
----- Message from Sabina Mills LPN sent at 3/23/2020 11:03 AM CDT -----  Please cancel her surgery for this Wednesday, and reschedule in 2 months

## 2020-04-03 DIAGNOSIS — N30.10 INTERSTITIAL CYSTITIS: ICD-10-CM

## 2020-04-06 RX ORDER — AMITRIPTYLINE HYDROCHLORIDE 25 MG/1
25 TABLET, FILM COATED ORAL NIGHTLY
Qty: 30 TABLET | Refills: 5 | Status: SHIPPED | OUTPATIENT
Start: 2020-04-06 | End: 2020-05-07 | Stop reason: SDUPTHER

## 2020-04-06 RX ORDER — HYDROXYZINE HYDROCHLORIDE 25 MG/1
TABLET, FILM COATED ORAL
Qty: 30 TABLET | Refills: 3 | Status: SHIPPED | OUTPATIENT
Start: 2020-04-06 | End: 2020-06-15 | Stop reason: SDUPTHER

## 2020-04-15 ENCOUNTER — TELEPHONE (OUTPATIENT)
Dept: PHARMACY | Facility: CLINIC | Age: 45
End: 2020-04-15

## 2020-04-20 DIAGNOSIS — R39.82 CHRONIC BLADDER PAIN: ICD-10-CM

## 2020-04-20 DIAGNOSIS — N30.10 IC (INTERSTITIAL CYSTITIS): ICD-10-CM

## 2020-04-20 NOTE — TELEPHONE ENCOUNTER
----- Message from Ender Black sent at 4/20/2020  9:35 AM CDT -----  Contact: pt: [phone]  Pt is calling to speak with nurse rasheeda AGRAWAL    Please contact pt: [phone]

## 2020-04-21 RX ORDER — ALPRAZOLAM 2 MG/1
2 TABLET ORAL NIGHTLY PRN
Qty: 30 TABLET | Refills: 1 | Status: SHIPPED | OUTPATIENT
Start: 2020-04-21 | End: 2020-06-15 | Stop reason: SDUPTHER

## 2020-04-29 ENCOUNTER — TELEPHONE (OUTPATIENT)
Dept: UROLOGY | Facility: CLINIC | Age: 45
End: 2020-04-29

## 2020-04-29 NOTE — TELEPHONE ENCOUNTER
Spoke with the pt to advise her that her procedure on 5-6 would have to be put on hold per dr bolden, pt agreed and verbally understood

## 2020-05-07 RX ORDER — AMITRIPTYLINE HYDROCHLORIDE 25 MG/1
25 TABLET, FILM COATED ORAL NIGHTLY
Qty: 30 TABLET | Refills: 5 | Status: SHIPPED | OUTPATIENT
Start: 2020-05-07 | End: 2020-06-15 | Stop reason: SDUPTHER

## 2020-05-11 ENCOUNTER — OFFICE VISIT (OUTPATIENT)
Dept: UROLOGY | Facility: CLINIC | Age: 45
End: 2020-05-11
Payer: COMMERCIAL

## 2020-05-11 ENCOUNTER — TELEPHONE (OUTPATIENT)
Dept: UROLOGY | Facility: CLINIC | Age: 45
End: 2020-05-11

## 2020-05-11 ENCOUNTER — TELEPHONE (OUTPATIENT)
Dept: PHARMACY | Facility: CLINIC | Age: 45
End: 2020-05-11

## 2020-05-11 DIAGNOSIS — N30.00 ACUTE CYSTITIS WITHOUT HEMATURIA: Primary | ICD-10-CM

## 2020-05-11 DIAGNOSIS — R10.9 RIGHT FLANK PAIN: ICD-10-CM

## 2020-05-11 PROCEDURE — 99214 OFFICE O/P EST MOD 30 MIN: CPT | Mod: 95,,, | Performed by: UROLOGY

## 2020-05-11 PROCEDURE — 99214 PR OFFICE/OUTPT VISIT, EST, LEVL IV, 30-39 MIN: ICD-10-PCS | Mod: 95,,, | Performed by: UROLOGY

## 2020-05-11 RX ORDER — CIPROFLOXACIN 500 MG/1
500 TABLET ORAL 2 TIMES DAILY
Qty: 14 TABLET | Refills: 0 | Status: SHIPPED | OUTPATIENT
Start: 2020-05-11 | End: 2020-05-18

## 2020-05-11 NOTE — PROGRESS NOTES
"Established Patient - Audio Only Telehealth Visit     The patient location is: home  The chief complaint leading to consultation is: C/o right flank/ mid back pain x 3 to 4  days  Visit type: Virtual visit with audio only (telephone)  Total time spent with patient: 13 mins       The reason for the audio only service rather than synchronous audio and video virtual visit was related to technical difficulties or patient preference/necessity.     Each patient to whom I provide medical services by telemedicine is:  (1) informed of the relationship between the physician and patient and the respective role of any other health care provider with respect to management of the patient; and (2) notified that they may decline to receive medical services by telemedicine and may withdraw from such care at any time. Patient verbally consented to receive this service via voice-only telephone call.       HPI: CC: bladder pain, s/p cysto hydrodistention and botox injection on 8/16/17    CHIEF COMPLAINT:     Mrs. Gonzales is a 42 y.o. female presenting for right flalnk pain since last Friday.    PRESENTING ILLNESS:     Elana Gonzales is a 42 y.o. female with a PMH of IC who presents for right / mid flank pain.  Denies any fever or chills.  However, the pain is difficult to bear.  No significant change in urination noted.    Her frequency urgency got better after previous botox injection.  So far OAB symptoms are not bothersome to her.  She is under stress especially due to her kids.    Has been on flomax, atarax, and oxybutynin xl.  Reports that her urine smells funny along with " bladder pressure"    s/p cysto hydrodistention and botox injection on 3/13/19.    Procedure(s) Performed:   1.  Cystoscopy with hydrodistension  2.  botox intravesical injection 100 units     Findings:   1.  Normal bladder mucosa with no lesions.  UOs in normal anatomic position  2.  100 U of Botox injected throughout detrusor  3.  hydrodistention with 700 cc " "and 700 cc capacity with terminal hematuria and glomerulations    Unable to take Elmiron because it makes her more difficult to urinate.  Elavil made her "pass out" and thus she stopped it.    Denies any fever or chills.     REVIEW OF SYSTEMS:  Constitutional: Negative for fever and chills.   HENT: Negative for hearing loss.   Eyes: Negative for visual disturbance.   Respiratory: Negative for shortness of breath.   Cardiovascular: Negative for chest pain.   Gastrointestinal: Positive for nausea.   Negative for  vomiting, and constipation.   Genitourinary:  Negative for urgency, frequency, difficulty urinating, nocturia, incontinence, dysuria, hematuria, intermittency, hesitancy, incomplete bladder emptying, and decreased urine volume.   Neurological: Positive for dizziness.   Hematological: Does not bruise/bleed easily.   Psychiatric/Behavioral: Negative for confusion.      PATIENT HISTORY:          Past Medical History:   Diagnosis Date    Anxiety      Dizziness      Interstitial cystitis      Migraine headache                 Past Surgical History:   Procedure Laterality Date     SECTION, CLASSIC        COLONOSCOPY N/A 2016     Procedure: COLONOSCOPY;  Surgeon: Phuong Carrillo MD;  Location: [facility];  Service: Endoscopy;  Laterality: N/A;    HYSTERECTOMY        OOPHORECTOMY        TONSILLECTOMY             No family history on file.     Social History   Social History            Social History    Marital status:        Spouse name: N/A    Number of children: N/A    Years of education: N/A          Occupational History    Not on file.            Social History Main Topics    Smoking status: Never Smoker    Smokeless tobacco: Never Used    Alcohol use No    Drug use: No    Sexual activity: Yes       Partners: Male           Other Topics Concern    Not on file          Social History Narrative    No narrative on file            Allergies:  Bactrim " [sulfamethoxazole-trimethoprim]; Codeine; and Zofran [ondansetron hcl (pf)]     Medications:     Current Outpatient Prescriptions:     amitriptyline (ELAVIL) 25 MG tablet, Take 1 tablet (25 mg total) by mouth nightly as needed for Insomnia., Disp: 30 tablet, Rfl: 11    butalbital-acetaminophen-caffeine -40 mg (FIORICET, ESGIC) -40 mg per tablet, Take 1-2 tablets by mouth every 6 (six) hours as needed for Headaches., Disp: 50 tablet, Rfl: 1    estradiol (ESTRACE) 1 MG tablet, Take 1 mg by mouth once daily. , Disp: , Rfl:     FLOMAX 0.4 mg Cp24, Take 1 capsule (0.4 mg total) by mouth once daily., Disp: 30 capsule, Rfl: 11    hydrOXYzine HCl (ATARAX) 25 MG tablet, Take 1 tablet (25 mg total) by mouth every evening., Disp: 30 tablet, Rfl: 11    oxybutynin (DITROPAN) 5 MG Tab, TAKE 1 TABLET BY MOUTH TWO TIMES DAILY, Disp: 60 tablet, Rfl: 12    amitriptyline (ELAVIL) 50 MG tablet, Take 1 tablet (50 mg total) by mouth every evening., Disp: 30 tablet, Rfl: 11    trospium (SANCTURA) 20 mg Tab tablet, Take 1 tablet (20 mg total) by mouth 2 (two) times daily. Take it in an empty stomach, Disp: 60 tablet, Rfl: 11     PHYSICAL EXAMINATION:     Constitutional: She is oriented to person, place, and time. She appears well-developed and well-nourished.  She is in no apparent distress.  Neck: No tracheal deviation present.      Cardiovascular: Normal rate.     Pulmonary/Chest: Effort normal. No respiratory distress.      Abdominal:  She exhibits no distension.  There is no CVA tenderness.      Lymphadenopathy:          Right: No supraclavicular adenopathy present.        Left: No supraclavicular adenopathy present.    Neurological: She is alert and oriented to person, place, and time.      Skin: Skin is warm and dry.      Extremities: No pitting edema noted in lower extremities bilaterally  Psych: Cooperative with normal affect.     Physical Exam   n/a     IMPRESSION:     Acute cystitis without hematuria  -      ciprofloxacin HCl (CIPRO) 500 MG tablet; Take 1 tablet (500 mg total) by mouth 2 (two) times daily. for 14 doses  Dispense: 14 tablet; Refill: 0  -     Urine culture; Future; Expected date: 05/11/2020    Right flank pain  -     X-Ray Abdomen AP 1 View; Future; Expected date: 05/11/2020      Diagnoses and all orders for this visit:  Diagnoses and all orders for this visit:    Acute cystitis without hematuria  -     ciprofloxacin HCl (CIPRO) 500 MG tablet; Take 1 tablet (500 mg total) by mouth 2 (two) times daily. for 14 doses  -     Urine culture; Future    Right flank pain  -     X-Ray Abdomen AP 1 View; Future         PLAN:  I explained possible differential diagnosis for her right flank pain if it is related to urologic issues.  Will do a home collect urine culture today as well as KUB to r/o possible kidney stone.  May need CT if her symptoms perisist.  She can start cipro until urine culture result is available.    I reviewed her MEDS and explained the purpose of each one.  Continue flomax, hydroxyzine, oxybutynin xl.  continue Pepcid in order to achieve complete anti-histamine effects.  Continue IC smart diet.  Consider drinking alkaline water, Evamor instead of other water.  Baking soda water prn recommended.  She wanted to have a repeat cysto with hydrodistention and botox. Will schedule her at the end of March.     I spent 25 minutes with the patient of which more than half was spent in direct consultation with the patient in regards to our treatment and plan.     Follow up:  Follow up if symptoms worsen or fail to improve.    This service was not originating from a related E/M service provided within the previous 7 days nor will  to an E/M service or procedure within the next 24 hours or my soonest available appointment.  Prevailing standard of care was able to be met in this audio-only visit.

## 2020-05-11 NOTE — TELEPHONE ENCOUNTER
----- Message from Helene Jean Baptiste sent at 5/11/2020  8:36 AM CDT -----  Contact: [phone]  Calling in regards to speaking with nurse about back pains and painful urination. Please call

## 2020-05-12 ENCOUNTER — TELEPHONE (OUTPATIENT)
Dept: UROLOGY | Facility: CLINIC | Age: 45
End: 2020-05-12

## 2020-05-14 ENCOUNTER — TELEPHONE (OUTPATIENT)
Dept: UROLOGY | Facility: CLINIC | Age: 45
End: 2020-05-14

## 2020-05-14 DIAGNOSIS — Z13.9 SCREENING FOR UNSPECIFIED CONDITION: Primary | ICD-10-CM

## 2020-05-14 DIAGNOSIS — R10.9 RIGHT FLANK PAIN: Primary | ICD-10-CM

## 2020-05-14 DIAGNOSIS — R10.9 FLANK PAIN: ICD-10-CM

## 2020-05-14 RX ORDER — OXYCODONE AND ACETAMINOPHEN 5; 325 MG/1; MG/1
1 TABLET ORAL EVERY 6 HOURS PRN
Qty: 20 TABLET | Refills: 0 | Status: SHIPPED | OUTPATIENT
Start: 2020-05-14 | End: 2020-05-14

## 2020-05-14 NOTE — TELEPHONE ENCOUNTER
----- Message from Sabina Mills LPN sent at 5/14/2020  7:40 AM CDT -----  Contact: [phone]  Can you review her kub   ----- Message -----  From: Shannan Frank LPN  Sent: 5/13/2020   2:38 PM CDT  To: Sabina Mills LPN    Pt had blood work, culture and kub. She would like you to let her know the results.  ----- Message -----  From: Helene Jean Baptiste  Sent: 5/13/2020   2:03 PM CDT  To: [facility]io [facility] Staff    Calling to speak with Sabina regarding test. Please call

## 2020-05-14 NOTE — TELEPHONE ENCOUNTER
CT RSS 5/14/20    No nephrolithiasis, ureterolithiasis, hydronephrosis or hydroureter.    Mild constipation.    Right flank pain    I reviewed her CT from today.  No urologic problems noted.  She can finish cipro as given in case of UTI.  But if she continues to have a pain, needs to see her PCP or RTC for examination.

## 2020-05-14 NOTE — TELEPHONE ENCOUNTER
Still c/o constant right flank pain.  No stone seen on KUB.  Will schedule her for CT RSS today  Have her follow up with me on Tues if pain persists.  Percocet #20 pills will be mailed to her.    Flank pain  -     CT Renal Stone Study ABD Pelvis WO; Future; Expected date: 05/14/2020  -     oxyCODONE-acetaminophen (PERCOCET) 5-325 mg per tablet; Take 1 tablet by mouth every 6 (six) hours as needed for Pain.  Dispense: 20 tablet; Refill: 0

## 2020-05-14 NOTE — TELEPHONE ENCOUNTER
Spoke with ms rosibel to confirm her procedure date on 5-27 and her covid screening on 5-25 which will take place in [place], she agreed and verbally understood.

## 2020-05-14 NOTE — TELEPHONE ENCOUNTER
Screening for unspecified condition  -     COVID-19 Routine Screening; Future; Expected date: 05/25/2020

## 2020-05-14 NOTE — TELEPHONE ENCOUNTER
Left detailed message, that rss, kub and urine culture were negative. Percocet rx will not be mailed . She will need to contact her pcp

## 2020-05-26 ENCOUNTER — TELEPHONE (OUTPATIENT)
Dept: UROLOGY | Facility: CLINIC | Age: 45
End: 2020-05-26

## 2020-05-26 NOTE — TELEPHONE ENCOUNTER
----- Message from Sabina Mills LPN sent at 5/26/2020  1:13 PM CDT -----  Contact: Pt  Wants to cancel procedure   ----- Message -----  From: Eliot Deutsch  Sent: 5/26/2020  11:49 AM CDT  To: Silvio ROMEO Staff    Patient called to speak w/ someone regarding general inquiries she has in reference to her upcoming procedure on 5/27, requesting callback     Callback: [phone]

## 2020-05-26 NOTE — TELEPHONE ENCOUNTER
Called pt to confirm arrival time of 845am for procedure on 5-27-20. Gave pt NPO instructions and gave pt opportunity to ask questions. Pt verbalized understanding.

## 2020-05-26 NOTE — TELEPHONE ENCOUNTER
Pt called and stated she didn't take her covid screening on yesterday for her procedure scheduled for 5-27. Per Dr Anguiano she was advised to come to main campus today to have her screening. She was given the hours of operation and location. Pt agreed and verbally understood.

## 2020-05-28 ENCOUNTER — TELEPHONE (OUTPATIENT)
Dept: UROLOGY | Facility: CLINIC | Age: 45
End: 2020-05-28

## 2020-05-28 DIAGNOSIS — N30.10 IC (INTERSTITIAL CYSTITIS): Primary | ICD-10-CM

## 2020-05-28 DIAGNOSIS — R10.31 ABDOMINAL PAIN, RLQ (RIGHT LOWER QUADRANT): ICD-10-CM

## 2020-05-28 DIAGNOSIS — K21.9 GASTROESOPHAGEAL REFLUX DISEASE, ESOPHAGITIS PRESENCE NOT SPECIFIED: ICD-10-CM

## 2020-05-28 RX ORDER — FAMOTIDINE 40 MG/1
40 TABLET, FILM COATED ORAL NIGHTLY PRN
Qty: 30 TABLET | Refills: 11 | Status: SHIPPED | OUTPATIENT
Start: 2020-05-28 | End: 2021-02-23 | Stop reason: SDUPTHER

## 2020-05-28 NOTE — TELEPHONE ENCOUNTER
Spoke with the patient to confirm procedure and covid screening date, she will attend the [facility] on 6-8 for the screening. Pt was given address and hours of operation, she agreed and verbally understood.

## 2020-05-28 NOTE — TELEPHONE ENCOUNTER
IC (interstitial cystitis)  -     famotidine (PEPCID) 40 MG tablet; Take 1 tablet (40 mg total) by mouth nightly as needed (bladder pain).  Dispense: 30 tablet; Refill: 11    Abdominal pain, RLQ (right lower quadrant)    Gastroesophageal reflux disease, esophagitis presence not specified  -     famotidine (PEPCID) 40 MG tablet; Take 1 tablet (40 mg total) by mouth nightly as needed (bladder pain).  Dispense: 30 tablet; Refill: 11

## 2020-05-28 NOTE — TELEPHONE ENCOUNTER
----- Message from Shannan Frank LPN sent at 5/27/2020  1:53 PM CDT -----  Contact: pt: [phone]  Pt is requesting that you send rx for pepcid to [facility]. She was unable to get it anywhere else.  ----- Message -----  From: Ender Black  Sent: 5/27/2020  12:40 PM CDT  To: Silvio ROMEO Staff    Pt is calling to speak with Dr. Anguiano re medication       Please contact pt: [phone]

## 2020-05-28 NOTE — TELEPHONE ENCOUNTER
IC (interstitial cystitis)  -     Case Request Operating Room: CYSTOSCOPY, WITH BLADDER HYDRODISTENSION, CYSTOSCOPY,WITH BOTULINUM TOXIN INJECTION 100units

## 2020-05-29 ENCOUNTER — OFFICE VISIT (OUTPATIENT)
Dept: UROLOGY | Facility: CLINIC | Age: 45
End: 2020-05-29
Payer: COMMERCIAL

## 2020-05-29 DIAGNOSIS — R39.16 STRAINING TO VOID: Primary | ICD-10-CM

## 2020-05-29 DIAGNOSIS — R39.16 STRAINS TO URINATE: ICD-10-CM

## 2020-05-29 PROCEDURE — 99213 OFFICE O/P EST LOW 20 MIN: CPT | Mod: 95,,, | Performed by: UROLOGY

## 2020-05-29 PROCEDURE — 99213 PR OFFICE/OUTPT VISIT, EST, LEVL III, 20-29 MIN: ICD-10-PCS | Mod: 95,,, | Performed by: UROLOGY

## 2020-05-29 RX ORDER — TAMSULOSIN HYDROCHLORIDE 0.4 MG/1
0.4 CAPSULE ORAL 2 TIMES DAILY
Qty: 60 CAPSULE | Refills: 11 | Status: SHIPPED | OUTPATIENT
Start: 2020-05-29 | End: 2020-06-15 | Stop reason: SDUPTHER
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
## 2020-06-05 ENCOUNTER — TELEPHONE (OUTPATIENT)
Dept: PHARMACY | Facility: CLINIC | Age: 45
End: 2020-06-05

## 2020-06-05 NOTE — TELEPHONE ENCOUNTER
Refill call regarding Aimovig at [facility]. Will prepare for shipment with consent of patient on  to arrive . Next injection due . Copay $0.00. Patient has not started any new medications including OTC drugs. Patient has not had any medication/ dose or instruction changes. No new allergies or side effects reported with this shipment. Medication is being taken as prescribed by physician and properly stored. Two patient identifiers:  and Address verified. No sharps container requested. Patient has no questions or concerns for formerly [facility].

## 2020-06-05 NOTE — ANESTHESIA PAT ROS NOTE
06/05/2020  Elana Gonzales is a 44 y.o., female.      Pre-op Assessment          Review of Systems  Anesthesia Hx:  Hx of Anesthetic complications Denies Family Hx of Anesthesia complications.  Personal Hx of Anesthesia complications, Post-Operative Nausea/Vomiting, in the past, but not with recent anesthetics / prophylaxis   Social:  Non-Smoker, No Alcohol Use    Cardiovascular:   Exercise tolerance: good Housework/walking   Pulmonary:   Sleep Apnea, CPAP Snoring   Renal/:   Nocturia/IC/Bladder pain/OAB   Hepatic/GI:   GERD HX of diverticulitis   Musculoskeletal:   History: left foot fracture-4/2019-developed blood clot-left leg-took Eliquis-resolved    OB/GYN/PEDS:  S/ P-Hysterectomy   Neurological:   Headaches Migraines   Psych:   anxiety      Yoselin Ozuna RN  3/30/20 & re-reviewed & updates on 6/5/20       Anesthesia Assessment: Preoperative EQUATION    Planned Procedure: Procedure(s) (LRB):  CYSTOSCOPY, WITH BLADDER HYDRODISTENSION (N/A)  INSTILLATION, BLADDER (N/A)  CYSTOSCOPY,WITH BOTULINUM TOXIN INJECTION 100 UNITS (N/A)  Requested Anesthesia Type:General  Surgeon: Yong Anguiano MD  Service: Urology  Known or anticipated Date of Surgery:6/10/2020    Surgeon notes: reviewed and IC(interstitial cystisis)    Previous anesthesia records:3/13/19-Cystoscopy, with Bladder Hydrodistention Instillation, Bladder Injection, Botulinum Toxin, Type A 100 units-General-tolerated procedure well and no apparent anesthetic complications     Anesthesia Hx:  Hx of Anesthetic complications History of prior surgery of interest to airway management or planning: Previous anesthesia: MAC  8/2018 cysto with MAC.  Procedure performed at an [facility]. Denies Family Hx of Anesthesia complications.  Personal Hx of Anesthesia complications, Post-Operative Nausea/Vomiting, in the past, but not with recent anesthetics /  "prophylaxis    Airway/Jaw/Neck:  Airway Findings: Mouth Opening: Normal Tongue: Normal  General Airway Assessment: Adult  Mallampati: III  Improves to II with phonation.  TM Distance: Normal, at least 6 cm              Last PCP note: outside [facility] -Dr. Nick Reeves Jr,-MILTON Reid  Subspecialty notes: Gastroenterology, Neurology    Other important co-morbidities: GERD and  HX DVT, DANIEL, Obesity      Medical History     Diagnosis Date Comment Source   Anxiety   Provider   Dizziness   Provider   GERD (gastroesophageal reflux disease)   Provider   H. pylori infection   Provider   Interstitial cystitis   Provider   Migraine headache   Provider   PONV (postoperative nausea and vomiting)  x1 after Breast Reduction surgery Provider       Tests already available:  Results have been reviewed.  Lab-BMP done on 5/12/20, & reviewed.    Plan: Phone pending     Testing:  None Needed      Patient  has previously scheduled Medical Appointment:Appointment on 6/8/20, prior to surgery date.    Navigation: Phone Completed                                          Consults scheduled.Anesthesia Preop Clinic Assessment  Indicated-not required for this procedure/NO "Clearance" needed prior to this surgery.-Plan reviewed with anesthesia -(Dr.Leopold)-for original surgery date. Surgery now rescheduled for 6/10/20.                        Yoselin Ozuna RN  3/30/20 & update on 6/5/20    Addendum:Plan reviewed with anesthesia-Dr.Leopold. Yoselin Ozuna RN 4/2/20    Addendum:Re-reviewed plan with patient and updates to plan with preop instructions/medication instructions with patient. Phone screen redone with patient. Lab-BMP done on 5/12/20 & result reviewed. Yoselin Nova RN  6/5/20  "

## 2020-06-08 ENCOUNTER — CLINICAL SUPPORT (OUTPATIENT)
Dept: URGENT CARE | Facility: CLINIC | Age: 45
End: 2020-06-08
Payer: MEDICAID

## 2020-06-08 VITALS — TEMPERATURE: 98 F | OXYGEN SATURATION: 97 % | HEART RATE: 102 BPM

## 2020-06-08 DIAGNOSIS — Z13.9 SCREENING FOR UNSPECIFIED CONDITION: ICD-10-CM

## 2020-06-08 PROCEDURE — U0003 INFECTIOUS AGENT DETECTION BY NUCLEIC ACID (DNA OR RNA); SEVERE ACUTE RESPIRATORY SYNDROME CORONAVIRUS 2 (SARS-COV-2) (CORONAVIRUS DISEASE [COVID-19]), AMPLIFIED PROBE TECHNIQUE, MAKING USE OF HIGH THROUGHPUT TECHNOLOGIES AS DESCRIBED BY CMS-2020-01-R: HCPCS

## 2020-06-09 ENCOUNTER — TELEPHONE (OUTPATIENT)
Dept: UROLOGY | Facility: CLINIC | Age: 45
End: 2020-06-09

## 2020-06-09 ENCOUNTER — NURSE TRIAGE (OUTPATIENT)
Dept: ADMINISTRATIVE | Facility: CLINIC | Age: 45
End: 2020-06-09

## 2020-06-09 LAB — SARS-COV-2 RNA RESP QL NAA+PROBE: NOT DETECTED

## 2020-06-09 NOTE — TELEPHONE ENCOUNTER
Called pt to confirm arrival time of 930am for procedure on 6-10-20. Gave pt NPO instructions and gave pt opportunity to ask questions. Pt verbalized understanding.

## 2020-06-09 NOTE — TELEPHONE ENCOUNTER
No call made on behalf of Post Procedural Symptom Tracker due to doesn't meet protocol. Patient has had office visit since admit and has admit scheduled for tomorrow and has been retested for COVID-19. Results pending.     Reason for Disposition   Patient already left for the hospital/clinic     Has admission scheduled for tomorrow    Additional Information   Negative: Caller has already spoken with the PCP (or office), and has no further questions   Negative: Caller has already spoken with another triager and has no further questions   Negative: Caller has already spoken with another triager or PCP (or office), and has further questions and triager able to answer questions.   Negative: Busy signal.  First attempt to contact caller.  Follow-up call scheduled within 15 minutes.   Negative: No answer.  First attempt to contact caller.  Follow-up call scheduled within 15 minutes.   Negative: Message left on identified voicemail   Negative: Message left on unidentified voice mail. Phone number verified.   Negative: Message left with person in household   Negative: Wrong number reached. Phone number verified.   Negative: Second attempt to contact family AND no contact made. Phone number verified.   Negative: Cell phone out of range. Phone number verified.    Protocols used: NO CONTACT OR DUPLICATE CONTACT CALL-A-OH

## 2020-06-10 ENCOUNTER — ANESTHESIA EVENT (OUTPATIENT)
Dept: SURGERY | Facility: HOSPITAL | Age: 45
End: 2020-06-10
Payer: COMMERCIAL

## 2020-06-10 ENCOUNTER — HOSPITAL ENCOUNTER (OUTPATIENT)
Facility: HOSPITAL | Age: 45
Discharge: HOME | End: 2020-06-10
Attending: UROLOGY | Admitting: UROLOGY
Payer: COMMERCIAL

## 2020-06-10 ENCOUNTER — ANESTHESIA (OUTPATIENT)
Dept: SURGERY | Facility: HOSPITAL | Age: 45
End: 2020-06-10
Payer: COMMERCIAL

## 2020-06-10 VITALS
TEMPERATURE: 98 F | WEIGHT: 200 LBS | OXYGEN SATURATION: 99 % | DIASTOLIC BLOOD PRESSURE: 71 MMHG | BODY MASS INDEX: 34.15 KG/M2 | HEART RATE: 86 BPM | SYSTOLIC BLOOD PRESSURE: 115 MMHG | RESPIRATION RATE: 20 BRPM | HEIGHT: 64 IN

## 2020-06-10 DIAGNOSIS — N30.10 INTERSTITIAL CYSTITIS: ICD-10-CM

## 2020-06-10 DIAGNOSIS — R33.9 INCOMPLETE BLADDER EMPTYING: Primary | ICD-10-CM

## 2020-06-10 DIAGNOSIS — R31.9 HEMATURIA, UNSPECIFIED TYPE: ICD-10-CM

## 2020-06-10 PROCEDURE — 63600175 PHARM REV CODE 636 W HCPCS: Performed by: REGISTERED NURSE

## 2020-06-10 PROCEDURE — 00910 ANES TRANSURETHRAL PX NOS: CPT | Performed by: UROLOGY

## 2020-06-10 PROCEDURE — 51700 IRRIGATION OF BLADDER: CPT | Mod: 51,,, | Performed by: UROLOGY

## 2020-06-10 PROCEDURE — 25000003 PHARM REV CODE 250: Performed by: REGISTERED NURSE

## 2020-06-10 PROCEDURE — D9220A PRA ANESTHESIA: ICD-10-PCS | Mod: CRNA,,, | Performed by: REGISTERED NURSE

## 2020-06-10 PROCEDURE — 52287 PR CYSTOURETHROSCOPY WITH INJ FOR CHEMODENERVATION: ICD-10-PCS | Mod: 51,,, | Performed by: UROLOGY

## 2020-06-10 PROCEDURE — 36000707: Performed by: UROLOGY

## 2020-06-10 PROCEDURE — D9220A PRA ANESTHESIA: Mod: ANES,,, | Performed by: ANESTHESIOLOGY

## 2020-06-10 PROCEDURE — 37000009 HC ANESTHESIA EA ADD 15 MINS: Performed by: UROLOGY

## 2020-06-10 PROCEDURE — 25000003 PHARM REV CODE 250: Performed by: UROLOGY

## 2020-06-10 PROCEDURE — 27200651 HC AIRWAY, LMA: Performed by: ANESTHESIOLOGY

## 2020-06-10 PROCEDURE — 37000008 HC ANESTHESIA 1ST 15 MINUTES: Performed by: UROLOGY

## 2020-06-10 PROCEDURE — 52260 PR CYSTOSCOPY,DIL BLADDER,GEN ANESTH: ICD-10-PCS | Mod: ,,, | Performed by: UROLOGY

## 2020-06-10 PROCEDURE — 51700 PR IRRIGATION, BLADDER: ICD-10-PCS | Mod: 51,,, | Performed by: UROLOGY

## 2020-06-10 PROCEDURE — 36000706: Performed by: UROLOGY

## 2020-06-10 PROCEDURE — D9220A PRA ANESTHESIA: Mod: CRNA,,, | Performed by: REGISTERED NURSE

## 2020-06-10 PROCEDURE — 52260 CYSTOSCOPY AND TREATMENT: CPT | Mod: ,,, | Performed by: UROLOGY

## 2020-06-10 PROCEDURE — 52287 CYSTOSCOPY CHEMODENERVATION: CPT | Mod: 51,,, | Performed by: UROLOGY

## 2020-06-10 PROCEDURE — S0020 INJECTION, BUPIVICAINE HYDRO: HCPCS | Performed by: UROLOGY

## 2020-06-10 PROCEDURE — 63600175 PHARM REV CODE 636 W HCPCS: Mod: JG | Performed by: UROLOGY

## 2020-06-10 PROCEDURE — 71000044 HC DOSC ROUTINE RECOVERY FIRST HOUR: Performed by: UROLOGY

## 2020-06-10 PROCEDURE — D9220A PRA ANESTHESIA: ICD-10-PCS | Mod: ANES,,, | Performed by: ANESTHESIOLOGY

## 2020-06-10 PROCEDURE — 71000015 HC POSTOP RECOV 1ST HR: Performed by: UROLOGY

## 2020-06-10 RX ORDER — LIDOCAINE HYDROCHLORIDE 10 MG/ML
INJECTION, SOLUTION EPIDURAL; INFILTRATION; INTRACAUDAL; PERINEURAL
Status: DISCONTINUED | OUTPATIENT
Start: 2020-06-10 | End: 2020-06-10 | Stop reason: HOSPADM

## 2020-06-10 RX ORDER — KETAMINE HCL IN 0.9 % NACL 50 MG/5 ML
SYRINGE (ML) INTRAVENOUS
Status: DISCONTINUED | OUTPATIENT
Start: 2020-06-10 | End: 2020-06-10

## 2020-06-10 RX ORDER — LIDOCAINE HYDROCHLORIDE 20 MG/ML
INJECTION INTRAVENOUS
Status: DISCONTINUED | OUTPATIENT
Start: 2020-06-10 | End: 2020-06-10

## 2020-06-10 RX ORDER — ONDANSETRON 2 MG/ML
INJECTION INTRAMUSCULAR; INTRAVENOUS
Status: DISCONTINUED | OUTPATIENT
Start: 2020-06-10 | End: 2020-06-10

## 2020-06-10 RX ORDER — HEPARIN SODIUM 1000 [USP'U]/ML
INJECTION, SOLUTION INTRAVENOUS; SUBCUTANEOUS
Status: DISCONTINUED
Start: 2020-06-10 | End: 2020-06-10 | Stop reason: HOSPADM

## 2020-06-10 RX ORDER — BUPIVACAINE HYDROCHLORIDE 5 MG/ML
INJECTION, SOLUTION EPIDURAL; INTRACAUDAL
Status: DISCONTINUED
Start: 2020-06-10 | End: 2020-06-10 | Stop reason: HOSPADM

## 2020-06-10 RX ORDER — SODIUM CHLORIDE 9 MG/ML
INJECTION, SOLUTION INTRAVENOUS CONTINUOUS
Status: DISCONTINUED | OUTPATIENT
Start: 2020-06-10 | End: 2020-06-10 | Stop reason: HOSPADM

## 2020-06-10 RX ORDER — GLYCOPYRROLATE 0.2 MG/ML
INJECTION INTRAMUSCULAR; INTRAVENOUS
Status: DISCONTINUED | OUTPATIENT
Start: 2020-06-10 | End: 2020-06-10

## 2020-06-10 RX ORDER — SODIUM CHLORIDE 9 MG/ML
INJECTION, SOLUTION INTRAVENOUS CONTINUOUS PRN
Status: DISCONTINUED | OUTPATIENT
Start: 2020-06-10 | End: 2020-06-10

## 2020-06-10 RX ORDER — CEPHALEXIN 250 MG/1
250 CAPSULE ORAL EVERY 6 HOURS
Qty: 12 CAPSULE | Refills: 0 | Status: SHIPPED | OUTPATIENT
Start: 2020-06-10 | End: 2020-06-13

## 2020-06-10 RX ORDER — DEXAMETHASONE SODIUM PHOSPHATE 4 MG/ML
INJECTION, SOLUTION INTRA-ARTICULAR; INTRALESIONAL; INTRAMUSCULAR; INTRAVENOUS; SOFT TISSUE
Status: DISCONTINUED | OUTPATIENT
Start: 2020-06-10 | End: 2020-06-10

## 2020-06-10 RX ORDER — FENTANYL CITRATE 50 UG/ML
25 INJECTION, SOLUTION INTRAMUSCULAR; INTRAVENOUS EVERY 5 MIN PRN
Status: DISCONTINUED | OUTPATIENT
Start: 2020-06-10 | End: 2020-06-10 | Stop reason: HOSPADM

## 2020-06-10 RX ORDER — PROPOFOL 10 MG/ML
VIAL (ML) INTRAVENOUS
Status: DISCONTINUED | OUTPATIENT
Start: 2020-06-10 | End: 2020-06-10

## 2020-06-10 RX ORDER — LIDOCAINE HYDROCHLORIDE 10 MG/ML
INJECTION INFILTRATION; PERINEURAL
Status: DISCONTINUED
Start: 2020-06-10 | End: 2020-06-10 | Stop reason: HOSPADM

## 2020-06-10 RX ORDER — FENTANYL CITRATE 50 UG/ML
INJECTION, SOLUTION INTRAMUSCULAR; INTRAVENOUS
Status: DISCONTINUED | OUTPATIENT
Start: 2020-06-10 | End: 2020-06-10

## 2020-06-10 RX ORDER — INDOMETHACIN 25 MG/1
CAPSULE ORAL
Status: DISCONTINUED
Start: 2020-06-10 | End: 2020-06-10 | Stop reason: HOSPADM

## 2020-06-10 RX ORDER — PROCHLORPERAZINE EDISYLATE 5 MG/ML
5 INJECTION INTRAMUSCULAR; INTRAVENOUS ONCE AS NEEDED
Status: CANCELLED | OUTPATIENT
Start: 2020-06-10 | End: 2031-11-07

## 2020-06-10 RX ORDER — HEPARIN SODIUM 5000 [USP'U]/ML
INJECTION, SOLUTION INTRAVENOUS; SUBCUTANEOUS
Status: DISCONTINUED | OUTPATIENT
Start: 2020-06-10 | End: 2020-06-10 | Stop reason: HOSPADM

## 2020-06-10 RX ORDER — BUPIVACAINE HYDROCHLORIDE 5 MG/ML
INJECTION, SOLUTION EPIDURAL; INTRACAUDAL
Status: DISCONTINUED | OUTPATIENT
Start: 2020-06-10 | End: 2020-06-10 | Stop reason: HOSPADM

## 2020-06-10 RX ORDER — CEFTRIAXONE 1 G/1
1 INJECTION, POWDER, FOR SOLUTION INTRAMUSCULAR; INTRAVENOUS
Status: DISCONTINUED | OUTPATIENT
Start: 2020-06-10 | End: 2020-06-10 | Stop reason: HOSPADM

## 2020-06-10 RX ORDER — MIDAZOLAM HYDROCHLORIDE 1 MG/ML
INJECTION, SOLUTION INTRAMUSCULAR; INTRAVENOUS
Status: DISCONTINUED | OUTPATIENT
Start: 2020-06-10 | End: 2020-06-10

## 2020-06-10 RX ORDER — INDOMETHACIN 25 MG/1
CAPSULE ORAL
Status: DISCONTINUED | OUTPATIENT
Start: 2020-06-10 | End: 2020-06-10 | Stop reason: HOSPADM

## 2020-06-10 RX ADMIN — ONDANSETRON 4 MG: 2 INJECTION, SOLUTION INTRAMUSCULAR; INTRAVENOUS at 12:06

## 2020-06-10 RX ADMIN — MIDAZOLAM HYDROCHLORIDE 2 MG: 1 INJECTION, SOLUTION INTRAMUSCULAR; INTRAVENOUS at 12:06

## 2020-06-10 RX ADMIN — FENTANYL CITRATE 50 MCG: 50 INJECTION, SOLUTION INTRAMUSCULAR; INTRAVENOUS at 12:06

## 2020-06-10 RX ADMIN — FENTANYL CITRATE 25 MCG: 50 INJECTION, SOLUTION INTRAMUSCULAR; INTRAVENOUS at 12:06

## 2020-06-10 RX ADMIN — DEXAMETHASONE SODIUM PHOSPHATE 4 MG: 4 INJECTION, SOLUTION INTRAMUSCULAR; INTRAVENOUS at 12:06

## 2020-06-10 RX ADMIN — PROPOFOL 200 MG: 10 INJECTION, EMULSION INTRAVENOUS at 12:06

## 2020-06-10 RX ADMIN — Medication 30 MG: at 12:06

## 2020-06-10 RX ADMIN — LIDOCAINE HYDROCHLORIDE 50 MG: 20 INJECTION, SOLUTION INTRAVENOUS at 12:06

## 2020-06-10 RX ADMIN — Medication 20 MG: at 12:06

## 2020-06-10 RX ADMIN — CEFTRIAXONE 1 G: 1 INJECTION, SOLUTION INTRAVENOUS at 12:06

## 2020-06-10 RX ADMIN — SODIUM CHLORIDE: 0.9 INJECTION, SOLUTION INTRAVENOUS at 12:06

## 2020-06-10 RX ADMIN — GLYCOPYRROLATE 0.2 MG: 0.2 INJECTION, SOLUTION INTRAMUSCULAR; INTRAVENOUS at 12:06

## 2020-06-10 NOTE — DISCHARGE SUMMARY
[facility]  Discharge Note  Short Stay    Admit Date: 6/10/2020    Discharge Date and Time: 06/10/2020 12:23 PM      Attending Physician: Yong Anguiano MD     Discharge Provider: Reuben Rivera MD    Diagnoses:  There are no hospital problems to display for this patient.      Discharged Condition: stable    Hospital Course: Patient was admitted for cystoscopy, botox to the bladder, right retrograde pyelogram and tolerated the procedure well with no complications. She was discharged home in good condition on the same day.       Final Diagnoses: Same as principal problem.    Disposition: Home or Self Care    Follow up/Patient Instructions:    Medications:  Reconciled Home Medications:   Current Discharge Medication List      START taking these medications    Details   cephALEXin (KEFLEX) 250 MG capsule Take 1 capsule (250 mg total) by mouth every 6 (six) hours. for 3 days  Qty: 12 capsule, Refills: 0         CONTINUE these medications which have NOT CHANGED    Details   ALPRAZolam (XANAX) 2 MG Tab Take 1 tablet (2 mg total) by mouth nightly as needed.  Qty: 30 tablet, Refills: 1    Associated Diagnoses: IC (interstitial cystitis); Chronic bladder pain      amitriptyline (ELAVIL) 25 MG tablet Take 1 tablet (25 mg total) by mouth every evening.  Qty: 30 tablet, Refills: 5      butalbital-acetaminophen-caffeine -40 mg (FIORICET, ESGIC) -40 mg per tablet Take 1-2 tablets by mouth every 6 (six) hours as needed for Headaches.  Qty: 50 tablet, Refills: 1    Associated Diagnoses: Intractable migraine without aura and without status migrainosus      erenumab-aooe 140 mg/mL AtIn Inject 140 mg into the skin every 28 days.  Qty: 1 mL, Refills: 11    Associated Diagnoses: Intractable periodic headache syndrome      famotidine (PEPCID) 40 MG tablet Take 1 tablet (40 mg total) by mouth nightly as needed (bladder pain).  Qty: 30 tablet, Refills: 11    Associated Diagnoses: IC (interstitial cystitis);  Gastroesophageal reflux disease, esophagitis presence not specified      !! FLOMAX 0.4 mg Cap TAKE 1 CAPSULE BY MOUTH EVERY DAY  Qty: 90 capsule, Refills: 3    Associated Diagnoses: Incomplete bladder emptying      hydroxyzine HCL (ATARAX) 25 MG tablet TAKE ONE TABLET BY MOUTH EVERY EVENING  Qty: 30 tablet, Refills: 3    Associated Diagnoses: Interstitial cystitis      oxybutynin (DITROPAN-XL) 10 MG 24 hr tablet Take 1 tablet (10 mg total) by mouth every evening.  Qty: 30 tablet, Refills: 5    Associated Diagnoses: OAB (overactive bladder)      oxyCODONE-acetaminophen (PERCOCET)  mg per tablet Take 1 tablet by mouth every 12 (twelve) hours as needed.  Refills: 0      phenazopyridine (PYRIDIUM) 200 MG tablet TAKE 1 TABLET BY MOUTH THREE TIMES DAILY AS NEEDED FOR PAIN  Qty: 30 tablet, Refills: 9    Comments: 03/20/2020 1:21:27 PM      promethazine (PHENERGAN) 50 MG tablet Take 50 mg by mouth every 12 (twelve) hours as needed.  Refills: 0      meclizine (ANTIVERT) 25 mg tablet TAKE 1 TABLET BY MOUTH TWO TIMES DAILY FOR DIZZINESS  Refills: 0      metoclopramide HCl (REGLAN) 10 MG tablet Take 1 tablet (10 mg total) by mouth 2 (two) times daily as needed (Headache and nausea).  Qty: 15 tablet, Refills: 11    Associated Diagnoses: Intractable periodic headache syndrome      !! tamsulosin (FLOMAX) 0.4 mg Cap Take 1 capsule (0.4 mg total) by mouth 2 (two) times a day.  Qty: 60 capsule, Refills: 11    Associated Diagnoses: Straining to void       !! - Potential duplicate medications found. Please discuss with provider.        Discharge Procedure Orders   Diet Adult Regular     Notify your health care provider if you experience any of the following:  temperature >100.4     Notify your health care provider if you experience any of the following:  persistent nausea and vomiting or diarrhea     Notify your health care provider if you experience any of the following:  severe uncontrolled pain     Notify your health care  provider if you experience any of the following:  redness, tenderness, or signs of infection (pain, swelling, redness, odor or green/yellow discharge around incision site)     Notify your health care provider if you experience any of the following:  difficulty breathing or increased cough     Notify your health care provider if you experience any of the following:  severe persistent headache     Notify your health care provider if you experience any of the following:  persistent dizziness, light-headedness, or visual disturbances     Notify your health care provider if you experience any of the following:  increased confusion or weakness     Activity as tolerated     Follow-up Information     Yong Anguiano MD In 3 months.    Specialty:  Urology  Why:  post-op botox  Contact information:  [facility] [id number] [phone]

## 2020-06-10 NOTE — INTERVAL H&P NOTE
The patient has been examined and the H&P has been reviewed:    I concur with the findings and no changes have occurred since H&P was written.     UA dipstick - negative for all components    Anesthesia/Surgery risks, benefits and alternative options discussed and understood by patient/family.          There are no hospital problems to display for this patient.

## 2020-06-10 NOTE — ANESTHESIA PREPROCEDURE EVALUATION
06/10/2020  Elana Gonzales is a 44 y.o., female.  Pre-operative evaluation for CYSTOSCOPY, WITH BLADDER HYDRODISTENSION (N/A), CYSTOSCOPY,WITH BOTULINUM TOXIN INJECTION 100units (N/A)    Chief Complaint:interstitial cystitis    PMH:  Anxiety  GERD  Migraine with associated nausea, vertigo    Past Surgical History:   Procedure Laterality Date    BREAST SURGERY      2017     SECTION, CLASSIC      COLONOSCOPY N/A 2016    Procedure: COLONOSCOPY;  Surgeon: Phuong Carrillo MD;  Location: [facility];  Service: Endoscopy;  Laterality: N/A;    CYSTOSCOPY WITH HYDRODISTENSION OF BLADDER N/A 3/13/2019    Procedure: CYSTOSCOPY, WITH BLADDER HYDRODISTENSION;  Surgeon: Yong Anguiano MD;  Location: [facility] OR [address] [place] [address];  Service: Urology;  Laterality: N/A;  45 min    CYSTOSCOPY WITH HYDRODISTENSION OF BLADDER N/A 2020    Procedure: CYSTOSCOPY, WITH BLADDER HYDRODISTENSION;  Surgeon: Yong Anguiano MD;  Location: [facility] OR [address] [place] [address];  Service: Urology;  Laterality: N/A;  45 min    ESOPHAGOGASTRODUODENOSCOPY N/A 3/4/2019    Procedure: ESOPHAGOGASTRODUODENOSCOPY (EGD);  Surgeon: Luis Main MD;  Location: [facility] (4TH FLR);  Service: Endoscopy;  Laterality: N/A;    HYSTERECTOMY      INJECTION OF BOTULINUM TOXIN TYPE A N/A 2018    Procedure: INJECTION, BOTULINUM TOXIN, TYPE A 100 UNITS;  Surgeon: Yong Anguiano MD;  [facility];  Service: Urology;  Laterality: N/A;    INJECTION OF BOTULINUM TOXIN TYPE A N/A 3/13/2019    Procedure: INJECTION, BOTULINUM TOXIN, TYPE A 100 UNITS;  Surgeon: Yong Anguiano MD;  Location: [facility] OR 2ND FLR;  Service: Urology;  Laterality: N/A;    INSTILLATION OF URINARY BLADDER N/A 2018    Procedure: INSTILLATION, URINARY BLADDER;  Surgeon: Yong Anguiano MD;  Location: [facility] OR 1ST FLR;  Service: Urology;  Laterality: N/A;    INSTILLATION OF URINARY  BLADDER N/A 3/13/2019    Procedure: INSTILLATION, BLADDER;  Surgeon: Yong Anguiano MD;  Location: [facility] OR 2ND FLR;  Service: Urology;  Laterality: N/A;    INSTILLATION OF URINARY BLADDER N/A 5/27/2020    Procedure: INSTILLATION, BLADDER;  Surgeon: Yong Anguiano MD;  Location: [facility] OR 1ST FLR;  Service: Urology;  Laterality: N/A;    OOPHORECTOMY      TONSILLECTOMY      UPPER GASTROINTESTINAL ENDOSCOPY           Vital Signs Range (Last 24H):         CBC:   No results for input(s): WBC, RBC, HGB, HCT, PLT, MCV, MCH, MCHC in the last 720 hours.    CMP:   Recent Labs   Lab 05/12/20  1002      K 3.8      CO2 25   BUN 19*   CREATININE 0.74      CALCIUM 9.9       INR:  No results for input(s): PT, INR, PROTIME, APTT in the last 720 hours.      Diagnostic Studies:      EKG:  wnl    2D Echo:  Anesthesia Evaluation    I have reviewed the Patient Summary Reports.     I have reviewed the Nursing Notes.    I have reviewed the Medications.     Review of Systems  Social:  Non-Smoker    Cardiovascular:  Cardiovascular Normal     Pulmonary:  Pulmonary Normal        Physical Exam  General:  Obesity    Airway/Jaw/Neck:  Airway Findings: Mouth Opening: Normal Tongue: Normal  Mallampati: I  TM Distance: Normal, at least 6 cm  Jaw/Neck Findings:  Neck ROM: Normal ROM  Neck Findings:  Girth Increased, Short Neck      Dental:  Dental Findings: In tact   Chest/Lungs:  Chest/Lungs Findings: Normal Respiratory Rate     Heart/Vascular:  Heart Findings: Rhythm: Regular Rhythm        Mental Status:  Mental Status Findings:  Cooperative, Alert and Oriented         Anesthesia Plan  Type of Anesthesia, risks & benefits discussed:  Anesthesia Type:  general  Patient's Preference:   Intra-op Monitoring Plan: standard ASA monitors  Intra-op Monitoring Plan Comments:   Post Op Pain Control Plan:   Post Op Pain Control Plan Comments:   Induction:   IV  Beta Blocker:  Patient is not currently on a Beta-Blocker (No further  documentation required).       Informed Consent: Patient understands risks and agrees with Anesthesia plan.  Questions answered. Anesthesia consent signed with patient.  ASA Score: 2     Day of Surgery Review of History & Physical:    H&P update referred to the surgeon.     Anesthesia Plan Notes: LMA        Ready For Surgery From Anesthesia Perspective.

## 2020-06-10 NOTE — ANESTHESIA POSTPROCEDURE EVALUATION
Anesthesia Post Evaluation    Patient: Elana Gonzales    Procedure(s) Performed: Procedure(s) (LRB):  CYSTOSCOPY, WITH BLADDER HYDRODISTENSION (N/A)  CYSTOSCOPY,WITH BOTULINUM TOXIN INJECTION 100units (N/A)    Final Anesthesia Type: general    Patient location during evaluation: PACU  Patient participation: Yes- Able to Participate  Level of consciousness: awake and alert and oriented  Post-procedure vital signs: reviewed and stable  Pain management: adequate  Airway patency: patent    PONV status at discharge: No PONV  Anesthetic complications: no      Cardiovascular status: hemodynamically stable  Respiratory status: unassisted  Hydration status: euvolemic  Follow-up not needed.          Vitals Value Taken Time   /71 6/10/2020  2:02 PM   Temp 36.7 °C (98 °F) 6/10/2020 11:09 AM   Pulse 86 6/10/2020  2:05 PM   Resp 23 6/10/2020  2:05 PM   SpO2 99 % 6/10/2020  2:05 PM   Vitals shown include unvalidated device data.      No case tracking events are documented in the log.      Pain/Reyes Score: Reyes Score: 10 (6/10/2020  2:01 PM)

## 2020-06-10 NOTE — DISCHARGE INSTRUCTIONS
Post Cystoscopy Instructions  Do not strain to have a bowel movement  No strenuous exercise x 7 days  No driving while you are on narcotic pain medications or if your samuel  catheter is in place    You can expect:  To pass stone fragments if you had a stone procedure  Have pain when you void from your stent if you have a stent in place  See blood in your urine if you have a stent in place    If you have a catheter, please return to the ER if your catheter stops draining or you are having abdominal pain.    Call the doctor if:   Temperature is greater than 101F   Persistent vomiting and inability to keep food down   Inability to void if you do not have a catheter        Anesthesia: General Anesthesia     You are watched continuously during your procedure by your anesthesia provider.     Youre due to have surgery. During surgery, youll be given medicine called anesthesia or anesthetic. This will keep you comfortable and pain-free. Your anesthesia provider will use general anesthesia.  What is general anesthesia?  General anesthesia puts you into a state like deep sleep. It goes into the bloodstream (IV anesthetics), into the lungs (gas anesthetics), or both. You feel nothing during the procedure. You will not remember it. During the procedure, the anesthesia provider monitors you continuously. He or she checks your heart rate and rhythm, blood pressure, breathing, and blood oxygen.  · IV anesthetics. IV anesthetics are given through an IV line in your arm. Theyre often given first. This is so you are asleep before a gas anesthetic is started. Some kinds of IV anesthetics relieve pain. Others relax you. Your doctor will decide which kind is best in your case.  · Gas anesthetics. Gas anesthetics are breathed into the lungs. They are often used to keep you asleep. They can be given through a facemask or a tube placed in your larynx or trachea (breathing tube).  ¨ If you have a facemask, your anesthesia provider  will most likely place it over your nose and mouth while youre still awake. Youll breathe oxygen through the mask as your IV anesthetic is started. Gas anesthetic may be added through the mask.  ¨ If you have a tube in the larynx or trachea, it will be inserted into your throat after youre asleep.  Anesthesia tools and medicines  You will likely have:  · IV anesthetics. These are put into an IV line into your bloodstream.  · Gas anesthetics. You breathe these anesthetics into your lungs, where they pass into your bloodstream.  · Pulse oximeter. This is a small clip that is attached to the end of your finger. This measures your blood oxygen level.  · Electrocardiography leads (electrodes). These are small sticky pads that are placed on your chest. They record your heart rate and rhythm.  · Blood pressure cuff. This reads your blood pressure.  Risks and possible complications  General anesthesia has some risks. These include:  · Breathing problems  · Nausea and vomiting  · Sore throat or hoarseness (usually temporary)  · Allergic reaction to the anesthetic  · Irregular heartbeat (rare)  · Cardiac arrest (rare)   Anesthesia safety  · Follow all instructions you are given for how long not to eat or drink before your procedure.  · Be sure your doctor knows what medicines and drugs you take. This includes over-the-counter medicines, herbs, supplements, alcohol or other drugs. You will be asked when those were last taken.  · Have an adult family member or friend drive you home after the procedure.  · For the first 24 hours after your surgery:  ¨ Do not drive or use heavy equipment.  ¨ Do not make important decisions or sign legal documents. If important decisions or signing legal documents is necessary during the first 24 hours after surgery, have a trusted family member or spouse act on your behalf.  ¨ Avoid alcohol.  ¨ Have a responsible adult stay with you. He or she can watch for problems and help keep you  safe.  Date Last Reviewed: 12/1/2016  © 5235-5541 [employer]. [address] [place] [address], [place] [address]. All rights reserved. This information is not intended as a substitute for professional medical care. Always follow your healthcare professional's instructions.

## 2020-06-10 NOTE — PROGRESS NOTES
D/c samuel catheter per Dr Thomas. Pt needs to void before being discharged. Will continue to monitor.

## 2020-06-10 NOTE — OP NOTE
[facility]  Operative Note    Date: 06/10/2020    Pre-Op Diagnosis:   - interstitial cystitis  Patient Active Problem List    Diagnosis Date Noted    Strains to urinate 05/29/2020    Interstitial cystitis 03/13/2019    GERD (gastroesophageal reflux disease) 03/04/2019    Bladder pain 04/13/2018    IC (interstitial cystitis) 08/16/2017    Acute UTI 07/28/2017    Nocturia 07/25/2017    Dizzy 10/04/2016    History of diverticulitis 01/27/2016    Abdominal pain, RLQ (right lower quadrant) 01/14/2016    Intractable migraine without aura and without status migrainosus 10/07/2015    Migraine without aura, with intractable migraine, so stated, without mention of status migrainosus 06/26/2015    Anxiety state 06/26/2015    Memory loss 06/26/2015    Incomplete bladder emptying 06/17/2014    Right flank pain 06/17/2014     Post-Op Diagnosis: same    Procedure(s) Performed:   1.  Cystoscopy with hydrodistension    Specimen(s): None    Staff Surgeon: Yong Anguiano MD    Assistant Surgeon: Reuben Rivera MD; Horacio Thomas MD     Anesthesia: Monitored Local Anesthesia with Sedation    Indications: Elana Gonzales is a 44 y.o. female with interstitial cystitis presenting for hydrodistension.    Findings:   - Cystoscopy with ureteral orifices in normal anatomic position  - Some debris upon efflux of right UO, retrograde showed right ureter with normal course and caliber. Delicate calyces of right kidney. No filling defect or stone on RPG.  - Bladder capacity was 650  - Hyperemia post hydrodistention with some terminal hematuria      Estimated Blood Loss: min    Drains: 16 Fr samuel catheter    Procedure in detail: After risks, benefits and possible complications of hydro distention were discussed with the patient informed consent was obtained. All questions were answered in the pre-operative area.  The patient was transferred to the cystoscopy suite and placed in the supine position.  SCDs were applied  and working.  Anesthesia was administered.  After adequate anesthesia the patient was placed in the dorsal lithotomy position and prepped and draped in the usual sterile fashion. Time out was preformed and magaly-procedural antibiotics were confirmed.    A rigid cystoscope in a 22 Fr sheath was introduced into the bladder per urethra. This passed easily. The entire urethra was visualized which showed no masses or strictures. The right and left ureteral orifices were identified in the normal anatomic position. Formal cystoscopy was performed, which revealed no masses or lesions suspicious for malignancy, no trabeculations, no bladder stones and no bladder diverticuli. Some debris was seen effluxing from the right UO which prompted investigation with right retrograde pyelogram. Findings are above.    100 units of botox was injected into the detrusor muscle throughout the bladder. Good wheals were raised. The patient's bladder was drained.    The bladder was then filled with sterile water until equilibrium was reached at 80 cm of water. The capacity of the bladder was 650. This was held and emptied. This was repeated and the second bladder capacity was 650. There was terminal hematuria seen. There were no ulcerations seen. There were glomerulations seen upon repeat cystoscopy.    The bladder was drained and the cystoscope removed.  A 16 Fr samuel catheter was placed.  A 200mL concoction of 40 000 U heparin, 1 % lidocaine, 0.5% bupivicaine, and 8.4% NaHCO3 was instilled into the bladder with instructions to keep the instillation in the bladder for at least 30 minutes postoperatively.    The patient was removed from lithotomy and transferred to recovery in stable condition.    Disposition:  The patient will follow up with Dr. Anguiano in 3 months.  Prescriptions were given for keflex.    Reuben Rivera MD

## 2020-06-10 NOTE — TRANSFER OF CARE
"Anesthesia Transfer of Care Note    Patient: Elana Gonzales    Procedure(s) Performed: Procedure(s) (LRB):  CYSTOSCOPY, WITH BLADDER HYDRODISTENSION (N/A)  CYSTOSCOPY,WITH BOTULINUM TOXIN INJECTION 100units (N/A)    Patient location: PACU    Anesthesia Type: general    Transport from OR: Transported from OR on 6-10 L/min O2 by face mask with adequate spontaneous ventilation    Post pain: adequate analgesia    Post assessment: no apparent anesthetic complications and tolerated procedure well    Post vital signs: stable    Level of consciousness: sedated    Nausea/Vomiting: no nausea/vomiting    Complications: none    Transfer of care protocol was followed      Last vitals:   Visit Vitals  BP (!) 135/90 (BP Location: Right arm, Patient Position: Lying)   Pulse 103   Temp 36.7 °C (98 °F) (Oral)   Resp 18   Ht 5' 4" (1.626 m)   Wt 90.7 kg (200 lb)   LMP 08/26/2008   SpO2 100%   Breastfeeding? No   BMI 34.33 kg/m²     "

## 2020-06-15 DIAGNOSIS — R39.82 CHRONIC BLADDER PAIN: ICD-10-CM

## 2020-06-15 DIAGNOSIS — N30.10 IC (INTERSTITIAL CYSTITIS): ICD-10-CM

## 2020-06-15 DIAGNOSIS — N32.81 OAB (OVERACTIVE BLADDER): ICD-10-CM

## 2020-06-15 DIAGNOSIS — R39.16 STRAINING TO VOID: ICD-10-CM

## 2020-06-15 DIAGNOSIS — N30.10 INTERSTITIAL CYSTITIS: ICD-10-CM

## 2020-06-15 NOTE — TELEPHONE ENCOUNTER
----- Message from Vianey Jensen MA sent at 6/15/2020  2:52 PM CDT -----  Regarding: transferring medication  Pt wants to discuss having her medication changed to a different pharmacy but did not disclose which medication (she will tell the nurse) also questions about her recent procedure.     [phone]

## 2020-06-16 RX ORDER — TAMSULOSIN HYDROCHLORIDE 0.4 MG/1
0.4 CAPSULE ORAL 2 TIMES DAILY
Qty: 60 CAPSULE | Refills: 11 | Status: SHIPPED | OUTPATIENT
Start: 2020-06-16 | End: 2020-08-10 | Stop reason: CLARIF

## 2020-06-16 RX ORDER — AMITRIPTYLINE HYDROCHLORIDE 25 MG/1
25 TABLET, FILM COATED ORAL NIGHTLY
Qty: 30 TABLET | Refills: 11 | Status: SHIPPED | OUTPATIENT
Start: 2020-06-16 | End: 2021-02-23

## 2020-06-16 RX ORDER — ALPRAZOLAM 2 MG/1
2 TABLET ORAL NIGHTLY PRN
Qty: 30 TABLET | Refills: 1 | Status: SHIPPED | OUTPATIENT
Start: 2020-06-16 | End: 2020-08-17

## 2020-06-16 RX ORDER — HYDROXYZINE HYDROCHLORIDE 25 MG/1
25 TABLET, FILM COATED ORAL NIGHTLY
Qty: 30 TABLET | Refills: 11 | Status: SHIPPED | OUTPATIENT
Start: 2020-06-16 | End: 2021-02-23

## 2020-06-16 RX ORDER — OXYBUTYNIN CHLORIDE 10 MG/1
10 TABLET, EXTENDED RELEASE ORAL NIGHTLY
Qty: 30 TABLET | Refills: 11 | Status: SHIPPED | OUTPATIENT
Start: 2020-06-16 | End: 2020-08-25

## 2020-06-30 ENCOUNTER — TELEPHONE (OUTPATIENT)
Dept: UROLOGY | Facility: CLINIC | Age: 45
End: 2020-06-30

## 2020-06-30 NOTE — TELEPHONE ENCOUNTER
----- Message from Eliot Deutsch sent at 6/30/2020 11:15 AM CDT -----  Patient called to speak w/ someone regarding a burning sensation she's experiencing when urinating, requesting callback to discus treatment options     Callback: [phone]

## 2020-07-01 DIAGNOSIS — N30.00 ACUTE CYSTITIS WITHOUT HEMATURIA: Primary | ICD-10-CM

## 2020-07-06 ENCOUNTER — TELEPHONE (OUTPATIENT)
Dept: UROLOGY | Facility: CLINIC | Age: 45
End: 2020-07-06

## 2020-07-06 ENCOUNTER — TELEPHONE (OUTPATIENT)
Dept: PHARMACY | Facility: CLINIC | Age: 45
End: 2020-07-06

## 2020-07-06 DIAGNOSIS — N30.00 ACUTE CYSTITIS WITHOUT HEMATURIA: Primary | ICD-10-CM

## 2020-07-06 RX ORDER — AMOXICILLIN AND CLAVULANATE POTASSIUM 875; 125 MG/1; MG/1
1 TABLET, FILM COATED ORAL 2 TIMES DAILY
Qty: 14 TABLET | Refills: 0 | Status: SHIPPED | OUTPATIENT
Start: 2020-07-06 | End: 2020-07-13

## 2020-07-06 NOTE — TELEPHONE ENCOUNTER
Called pt to inform of script called in to pharmacy. She verbalized  Undestanding. Would like to know when she would be able to talk with . I told her I would send message to his nurse. She verbalized understanding.

## 2020-07-07 ENCOUNTER — OFFICE VISIT (OUTPATIENT)
Dept: UROLOGY | Facility: CLINIC | Age: 45
End: 2020-07-07
Payer: COMMERCIAL

## 2020-07-07 ENCOUNTER — TELEPHONE (OUTPATIENT)
Dept: UROLOGY | Facility: CLINIC | Age: 45
End: 2020-07-07

## 2020-07-07 DIAGNOSIS — R33.9 INCOMPLETE BLADDER EMPTYING: Primary | ICD-10-CM

## 2020-07-07 DIAGNOSIS — N39.0 RECURRENT UTI: ICD-10-CM

## 2020-07-07 DIAGNOSIS — Z13.9 ENCOUNTER FOR SCREENING, UNSPECIFIED: ICD-10-CM

## 2020-07-07 DIAGNOSIS — Z13.9 SCREENING FOR UNSPECIFIED CONDITION: ICD-10-CM

## 2020-07-07 DIAGNOSIS — K59.09 CHRONIC CONSTIPATION: ICD-10-CM

## 2020-07-07 PROCEDURE — 99214 PR OFFICE/OUTPT VISIT, EST, LEVL IV, 30-39 MIN: ICD-10-PCS | Mod: 95,,, | Performed by: UROLOGY

## 2020-07-07 PROCEDURE — 99214 OFFICE O/P EST MOD 30 MIN: CPT | Mod: 95,,, | Performed by: UROLOGY

## 2020-07-07 RX ORDER — NITROFURANTOIN (MACROCRYSTALS) 100 MG/1
100 CAPSULE ORAL NIGHTLY
Qty: 100 CAPSULE | Refills: 0 | Status: SHIPPED | OUTPATIENT
Start: 2020-07-07 | End: 2020-07-17

## 2020-07-07 NOTE — PROGRESS NOTES
Established Patient - Audio Only Telehealth Visit     The patient location is: home  The chief complaint leading to consultation is: recurrent UTI, hx of incomplete bladder emptying, IC, chronic constipation.  Visit type: Virtual visit with audio only (telephone)  Total time spent with patient: 25 mins       The reason for the audio only service rather than synchronous audio and video virtual visit was related to technical difficulties or patient preference/necessity.     Each patient to whom I provide medical services by telemedicine is:  (1) informed of the relationship between the physician and patient and the respective role of any other health care provider with respect to management of the patient; and (2) notified that they may decline to receive medical services by telemedicine and may withdraw from such care at any time. Patient verbally consented to receive this service via voice-only telephone call.       HPI: recurrent UTI, hx of incomplete bladder emptying, IC, chronic constipation.    She developed another UTI.  She is wondering what else she can do.  C/o incomplete bladder emptying, chronic bladder and chronic constipation.  She saw Dr. Kinsey in GI in the past, and has taken Miralax.  I also recommended Probiotics.    She is interested in InterStim Therapy to improve her bladder symptoms as well as her chronic constipation.  She would like to continue IC meds.    I explained the nature, benefits and risks of  InterStim Therapy.  Will plan Stage I InterStim Therapy.     Assessment and plan:     Incomplete bladder emptying    Recurrent UTI  -     nitrofurantoin (MACRODANTIN) 100 MG capsule; Take 1 capsule (100 mg total) by mouth nightly. for 10 days  Dispense: 100 capsule; Refill: 0    Chronic constipation    I spent 25 minutes with the patient of which more than half was spent in direct consultation with the patient in regards to our treatment and plan.    I asked her to look into InterStim website for  further information.    This service was not originating from a related E/M service provided within the previous 7 days nor will  to an E/M service or procedure within the next 24 hours or my soonest available appointment.  Prevailing standard of care was able to be met in this audio-only visit.

## 2020-07-07 NOTE — TELEPHONE ENCOUNTER
Incomplete bladder emptying  -     Case Request Operating Room: INSERTION, NEUROSTIMULATOR, TEMPORARY, SACRAL flouro    Screening for unspecified condition  -     COVID-19 Routine Screening; Future; Expected date: 07/19/2020    Encounter for screening, unspecified  -     COVID-19 Routine Screening; Future; Expected date: 07/19/2020

## 2020-07-15 NOTE — ANESTHESIA PAT ROS NOTE
07/15/2020  Elana Gonzales is a 45 y.o., female.      Pre-op Assessment          Review of Systems  Anesthesia Hx:  Hx of Anesthetic complications  Denies Family Hx of Anesthesia complications.  Personal Hx of Anesthesia complications, Post-Operative Nausea/Vomiting, in the past, but not with recent anesthetics / prophylaxis   Social:  No Alcohol Use, Non-Smoker    Cardiovascular:   Exercise tolerance: good Walking daily-all day per patient/Housework   Pulmonary:   Sleep Apnea, CPAP (not using at present)/snoring   Renal/:   Nocturia/ IC Bladder pain/OAB   Hepatic/GI:   GERD HX of diverticulitis   Musculoskeletal:   History: left foot fracture-4/2019-developed blood clot-left leg-took Eliquis-resolved   OB/GYN/PEDS:  S/P-Hysterectomy     Neurological:   Headaches Migraines   Psych:   anxiety      Yoselin Ozuna RN  7/15/20       Anesthesia Assessment: Preoperative EQUATION    Planned Procedure: Procedure(s) (LRB):  INSERTION, NEUROSTIMULATOR, TEMPORARY, SACRAL flouro (N/A)  Requested Anesthesia Type:Monitor Anesthesia Care  Surgeon: Yong Anguiano MD  Service: Urology  Known or anticipated Date of Surgery:7/22/2020    Surgeon notes: reviewed and Incomplete bladder emptying    Previous anesthesia records:6/10/20-Cystoscopy,with BladderHydrodistension Cystosopy, with Botulinum ToxinInfection Instillation, Bladder-General- no apparent anesthetic complications and tolerated procedure well    Last PCP note: outside [facility] , Dr. Roland S. Waguespack, Jr.-MILTON Reid  Subspecialty notes: Gastroenterology, Neurology    Other important co-morbidities: GERD, DANIEL and Hx DVT/ Obesity    Medical History    Diagnosis Date Comment Source   Anxiety   Provider   Dizziness   Provider   GERD (gastroesophageal reflux disease)   Provider   H. pylori infection   Provider   Interstitial cystitis   Provider   Migraine headache    Provider   PONV (postoperative nausea and vomiting)  x1 after Breast Reduction surgery Provider       Tests already available:  Results have been reviewed. Labs-6/8/20-COVID-19 Screening/ 5/12/20-BMP/ EKG-11/7/19  Plan:Phone pending      Testing:  None Needed     Patient  has previously scheduled Medical Appointment:Appointment on 7/19/20, prior to the surgery date.    Navigation: Phone Completed                                    Consults scheduled.None needed at this time.                           Straight Line to surgery.               No tests, anesthesia preop clinic visit, or consult required.    Yoselin Ozuna RN   7/15/20

## 2020-07-19 ENCOUNTER — LAB VISIT (OUTPATIENT)
Dept: URGENT CARE | Facility: CLINIC | Age: 45
End: 2020-07-19
Payer: MEDICAID

## 2020-07-19 VITALS — TEMPERATURE: 98 F | HEART RATE: 82 BPM | OXYGEN SATURATION: 98 %

## 2020-07-19 DIAGNOSIS — Z13.9 ENCOUNTER FOR SCREENING, UNSPECIFIED: ICD-10-CM

## 2020-07-19 DIAGNOSIS — Z13.9 SCREENING FOR UNSPECIFIED CONDITION: ICD-10-CM

## 2020-07-19 PROCEDURE — U0003 INFECTIOUS AGENT DETECTION BY NUCLEIC ACID (DNA OR RNA); SEVERE ACUTE RESPIRATORY SYNDROME CORONAVIRUS 2 (SARS-COV-2) (CORONAVIRUS DISEASE [COVID-19]), AMPLIFIED PROBE TECHNIQUE, MAKING USE OF HIGH THROUGHPUT TECHNOLOGIES AS DESCRIBED BY CMS-2020-01-R: HCPCS

## 2020-07-20 LAB — SARS-COV-2 RNA RESP QL NAA+PROBE: NOT DETECTED

## 2020-07-21 ENCOUNTER — TELEPHONE (OUTPATIENT)
Dept: UROLOGY | Facility: CLINIC | Age: 45
End: 2020-07-21

## 2020-07-21 ENCOUNTER — TELEPHONE (OUTPATIENT)
Dept: URGENT CARE | Facility: CLINIC | Age: 45
End: 2020-07-21

## 2020-07-21 NOTE — TELEPHONE ENCOUNTER
Called pt to confirm arrival time of 9:45 for procedure on 7/22/2020. Gave pt NPO instructions and gave pt opportunity to ask questions. Pt verbalized understanding.

## 2020-07-22 ENCOUNTER — HOSPITAL ENCOUNTER (OUTPATIENT)
Facility: HOSPITAL | Age: 45
Discharge: HOME | End: 2020-07-22
Attending: UROLOGY | Admitting: UROLOGY
Payer: COMMERCIAL

## 2020-07-22 ENCOUNTER — ANESTHESIA (OUTPATIENT)
Dept: SURGERY | Facility: HOSPITAL | Age: 45
End: 2020-07-22
Payer: COMMERCIAL

## 2020-07-22 ENCOUNTER — ANESTHESIA EVENT (OUTPATIENT)
Dept: SURGERY | Facility: HOSPITAL | Age: 45
End: 2020-07-22
Payer: COMMERCIAL

## 2020-07-22 VITALS
HEART RATE: 102 BPM | WEIGHT: 200 LBS | OXYGEN SATURATION: 98 % | BODY MASS INDEX: 34.15 KG/M2 | TEMPERATURE: 98 F | DIASTOLIC BLOOD PRESSURE: 70 MMHG | SYSTOLIC BLOOD PRESSURE: 115 MMHG | RESPIRATION RATE: 18 BRPM | HEIGHT: 64 IN

## 2020-07-22 DIAGNOSIS — U07.1 COVID-19: ICD-10-CM

## 2020-07-22 DIAGNOSIS — N30.10 IC (INTERSTITIAL CYSTITIS): ICD-10-CM

## 2020-07-22 DIAGNOSIS — R10.9 RIGHT FLANK PAIN: ICD-10-CM

## 2020-07-22 DIAGNOSIS — N39.0 ACUTE UTI: Primary | ICD-10-CM

## 2020-07-22 DIAGNOSIS — K59.09 CHRONIC CONSTIPATION: ICD-10-CM

## 2020-07-22 DIAGNOSIS — N39.0 RECURRENT UTI: ICD-10-CM

## 2020-07-22 DIAGNOSIS — R39.89 BLADDER PAIN: ICD-10-CM

## 2020-07-22 DIAGNOSIS — R39.16 STRAINS TO URINATE: ICD-10-CM

## 2020-07-22 DIAGNOSIS — R33.9 INCOMPLETE BLADDER EMPTYING: ICD-10-CM

## 2020-07-22 DIAGNOSIS — R33.9 URINARY RETENTION WITH INCOMPLETE BLADDER EMPTYING: ICD-10-CM

## 2020-07-22 PROCEDURE — D9220A PRA ANESTHESIA: ICD-10-PCS | Mod: CRNA,,, | Performed by: NURSE ANESTHETIST, CERTIFIED REGISTERED

## 2020-07-22 PROCEDURE — 63600175 PHARM REV CODE 636 W HCPCS: Performed by: UROLOGY

## 2020-07-22 PROCEDURE — 87077 CULTURE AEROBIC IDENTIFY: CPT

## 2020-07-22 PROCEDURE — 36000709 HC OR TIME LEV III EA ADD 15 MIN: Performed by: UROLOGY

## 2020-07-22 PROCEDURE — C1894 INTRO/SHEATH, NON-LASER: HCPCS | Performed by: UROLOGY

## 2020-07-22 PROCEDURE — D9220A PRA ANESTHESIA: Mod: CRNA,,, | Performed by: NURSE ANESTHETIST, CERTIFIED REGISTERED

## 2020-07-22 PROCEDURE — C1778 LEAD, NEUROSTIMULATOR: HCPCS | Performed by: UROLOGY

## 2020-07-22 PROCEDURE — 25000003 PHARM REV CODE 250: Performed by: STUDENT IN AN ORGANIZED HEALTH CARE EDUCATION/TRAINING PROGRAM

## 2020-07-22 PROCEDURE — 36000708 HC OR TIME LEV III 1ST 15 MIN: Performed by: UROLOGY

## 2020-07-22 PROCEDURE — 64581 OPN IMPLTJ NEA SACRAL NERVE: CPT | Mod: ,,, | Performed by: UROLOGY

## 2020-07-22 PROCEDURE — D9220A PRA ANESTHESIA: Mod: ANES,,, | Performed by: ANESTHESIOLOGY

## 2020-07-22 PROCEDURE — 76000 PR  FLUOROSCOPE EXAMINATION: ICD-10-PCS | Mod: 26,,, | Performed by: UROLOGY

## 2020-07-22 PROCEDURE — C1767 GENERATOR, NEURO NON-RECHARG: HCPCS | Performed by: UROLOGY

## 2020-07-22 PROCEDURE — D9220A PRA ANESTHESIA: ICD-10-PCS | Mod: ANES,,, | Performed by: ANESTHESIOLOGY

## 2020-07-22 PROCEDURE — 76000 FLUOROSCOPY <1 HR PHYS/QHP: CPT | Mod: 26,,, | Performed by: UROLOGY

## 2020-07-22 PROCEDURE — 27201423 OPTIME MED/SURG SUP & DEVICES STERILE SUPPLY: Performed by: UROLOGY

## 2020-07-22 PROCEDURE — 37000009 HC ANESTHESIA EA ADD 15 MINS: Performed by: UROLOGY

## 2020-07-22 PROCEDURE — 25000003 PHARM REV CODE 250: Performed by: UROLOGY

## 2020-07-22 PROCEDURE — 63600175 PHARM REV CODE 636 W HCPCS: Performed by: STUDENT IN AN ORGANIZED HEALTH CARE EDUCATION/TRAINING PROGRAM

## 2020-07-22 PROCEDURE — 71000044 HC DOSC ROUTINE RECOVERY FIRST HOUR: Performed by: UROLOGY

## 2020-07-22 PROCEDURE — 87086 URINE CULTURE/COLONY COUNT: CPT

## 2020-07-22 PROCEDURE — 37000008 HC ANESTHESIA 1ST 15 MINUTES: Performed by: UROLOGY

## 2020-07-22 PROCEDURE — 87088 URINE BACTERIA CULTURE: CPT

## 2020-07-22 PROCEDURE — 64581 PR IMPLANTATION, NEUROSTIM ELECT ARRAY, OPEN, SACRAL NERVE: ICD-10-PCS | Mod: ,,, | Performed by: UROLOGY

## 2020-07-22 PROCEDURE — 25000003 PHARM REV CODE 250: Performed by: NURSE ANESTHETIST, CERTIFIED REGISTERED

## 2020-07-22 PROCEDURE — 87186 SC STD MICRODIL/AGAR DIL: CPT

## 2020-07-22 PROCEDURE — 71000015 HC POSTOP RECOV 1ST HR: Performed by: UROLOGY

## 2020-07-22 PROCEDURE — 63600175 PHARM REV CODE 636 W HCPCS: Performed by: NURSE ANESTHETIST, CERTIFIED REGISTERED

## 2020-07-22 DEVICE — LEAD KIT TINED: Type: IMPLANTABLE DEVICE | Site: BACK | Status: FUNCTIONAL

## 2020-07-22 RX ORDER — LIDOCAINE HYDROCHLORIDE 20 MG/ML
INJECTION INTRAVENOUS
Status: DISCONTINUED | OUTPATIENT
Start: 2020-07-22 | End: 2020-07-22

## 2020-07-22 RX ORDER — SODIUM BICARBONATE 1 MEQ/ML
SYRINGE (ML) INTRAVENOUS
Status: DISCONTINUED | OUTPATIENT
Start: 2020-07-22 | End: 2020-07-22 | Stop reason: HOSPADM

## 2020-07-22 RX ORDER — FENTANYL CITRATE 50 UG/ML
INJECTION, SOLUTION INTRAMUSCULAR; INTRAVENOUS
Status: DISCONTINUED | OUTPATIENT
Start: 2020-07-22 | End: 2020-07-22

## 2020-07-22 RX ORDER — GLYCOPYRROLATE 0.2 MG/ML
INJECTION INTRAMUSCULAR; INTRAVENOUS
Status: DISCONTINUED | OUTPATIENT
Start: 2020-07-22 | End: 2020-07-22

## 2020-07-22 RX ORDER — ONDANSETRON 2 MG/ML
INJECTION INTRAMUSCULAR; INTRAVENOUS
Status: DISCONTINUED | OUTPATIENT
Start: 2020-07-22 | End: 2020-07-22

## 2020-07-22 RX ORDER — PROCHLORPERAZINE EDISYLATE 5 MG/ML
5 INJECTION INTRAMUSCULAR; INTRAVENOUS ONCE AS NEEDED
Status: DISCONTINUED | OUTPATIENT
Start: 2020-07-22 | End: 2020-07-22 | Stop reason: HOSPADM

## 2020-07-22 RX ORDER — SODIUM CHLORIDE 0.9 % (FLUSH) 0.9 %
10 SYRINGE (ML) INJECTION
Status: DISCONTINUED | OUTPATIENT
Start: 2020-07-22 | End: 2020-07-22 | Stop reason: HOSPADM

## 2020-07-22 RX ORDER — CEFAZOLIN SODIUM 1 G/3ML
2 INJECTION, POWDER, FOR SOLUTION INTRAMUSCULAR; INTRAVENOUS
Status: COMPLETED | OUTPATIENT
Start: 2020-07-22 | End: 2020-07-22

## 2020-07-22 RX ORDER — SODIUM CHLORIDE 9 MG/ML
INJECTION, SOLUTION INTRAVENOUS CONTINUOUS PRN
Status: DISCONTINUED | OUTPATIENT
Start: 2020-07-22 | End: 2020-07-22

## 2020-07-22 RX ORDER — CEPHALEXIN 500 MG/1
500 CAPSULE ORAL 2 TIMES DAILY
Qty: 14 CAPSULE | Refills: 0 | Status: SHIPPED | OUTPATIENT
Start: 2020-07-22 | End: 2020-07-29

## 2020-07-22 RX ORDER — BACITRACIN 50000 [IU]/1
INJECTION, POWDER, FOR SOLUTION INTRAMUSCULAR
Status: DISCONTINUED | OUTPATIENT
Start: 2020-07-22 | End: 2020-07-22 | Stop reason: HOSPADM

## 2020-07-22 RX ORDER — PROPOFOL 10 MG/ML
VIAL (ML) INTRAVENOUS
Status: DISCONTINUED | OUTPATIENT
Start: 2020-07-22 | End: 2020-07-22

## 2020-07-22 RX ORDER — ESMOLOL HYDROCHLORIDE 10 MG/ML
INJECTION INTRAVENOUS
Status: DISCONTINUED | OUTPATIENT
Start: 2020-07-22 | End: 2020-07-22

## 2020-07-22 RX ORDER — SODIUM CHLORIDE 9 MG/ML
INJECTION, SOLUTION INTRAVENOUS CONTINUOUS
Status: DISCONTINUED | OUTPATIENT
Start: 2020-07-22 | End: 2020-07-22 | Stop reason: HOSPADM

## 2020-07-22 RX ORDER — TRAMADOL HYDROCHLORIDE 50 MG/1
50 TABLET ORAL EVERY 6 HOURS PRN
Qty: 5 TABLET | Refills: 0 | Status: SHIPPED | OUTPATIENT
Start: 2020-07-22 | End: 2020-08-03 | Stop reason: CLARIF

## 2020-07-22 RX ORDER — LIDOCAINE HYDROCHLORIDE AND EPINEPHRINE 10; 10 MG/ML; UG/ML
INJECTION, SOLUTION INFILTRATION; PERINEURAL
Status: DISCONTINUED | OUTPATIENT
Start: 2020-07-22 | End: 2020-07-22 | Stop reason: HOSPADM

## 2020-07-22 RX ORDER — MIDAZOLAM HYDROCHLORIDE 1 MG/ML
INJECTION, SOLUTION INTRAMUSCULAR; INTRAVENOUS
Status: DISCONTINUED | OUTPATIENT
Start: 2020-07-22 | End: 2020-07-22

## 2020-07-22 RX ORDER — LIDOCAINE HYDROCHLORIDE 10 MG/ML
1 INJECTION, SOLUTION EPIDURAL; INFILTRATION; INTRACAUDAL; PERINEURAL ONCE
Status: COMPLETED | OUTPATIENT
Start: 2020-07-22 | End: 2020-07-22

## 2020-07-22 RX ADMIN — PROPOFOL 50 MG: 10 INJECTION, EMULSION INTRAVENOUS at 01:07

## 2020-07-22 RX ADMIN — PROPOFOL 40 MG: 10 INJECTION, EMULSION INTRAVENOUS at 01:07

## 2020-07-22 RX ADMIN — MIDAZOLAM HYDROCHLORIDE 1 MG: 1 INJECTION, SOLUTION INTRAMUSCULAR; INTRAVENOUS at 12:07

## 2020-07-22 RX ADMIN — ESMOLOL HYDROCHLORIDE 10 MG: 10 INJECTION INTRAVENOUS at 01:07

## 2020-07-22 RX ADMIN — PROPOFOL 30 MG: 10 INJECTION, EMULSION INTRAVENOUS at 01:07

## 2020-07-22 RX ADMIN — SODIUM CHLORIDE: 0.9 INJECTION, SOLUTION INTRAVENOUS at 11:07

## 2020-07-22 RX ADMIN — PROPOFOL 50 MG: 10 INJECTION, EMULSION INTRAVENOUS at 12:07

## 2020-07-22 RX ADMIN — PROPOFOL 20 MG: 10 INJECTION, EMULSION INTRAVENOUS at 01:07

## 2020-07-22 RX ADMIN — LIDOCAINE HYDROCHLORIDE 50 MG: 20 INJECTION, SOLUTION INTRAVENOUS at 12:07

## 2020-07-22 RX ADMIN — FENTANYL CITRATE 100 MCG: 50 INJECTION, SOLUTION INTRAMUSCULAR; INTRAVENOUS at 12:07

## 2020-07-22 RX ADMIN — PROPOFOL 30 MG: 10 INJECTION, EMULSION INTRAVENOUS at 12:07

## 2020-07-22 RX ADMIN — LIDOCAINE HYDROCHLORIDE 1 MG: 10 INJECTION, SOLUTION EPIDURAL; INFILTRATION; INTRACAUDAL; PERINEURAL at 11:07

## 2020-07-22 RX ADMIN — GENTAMICIN SULFATE 320 MG: 40 INJECTION, SOLUTION INTRAMUSCULAR; INTRAVENOUS at 11:07

## 2020-07-22 RX ADMIN — PROPOFOL 40 MG: 10 INJECTION, EMULSION INTRAVENOUS at 12:07

## 2020-07-22 RX ADMIN — ONDANSETRON 4 MG: 2 INJECTION, SOLUTION INTRAMUSCULAR; INTRAVENOUS at 12:07

## 2020-07-22 RX ADMIN — MIDAZOLAM HYDROCHLORIDE 2 MG: 1 INJECTION, SOLUTION INTRAMUSCULAR; INTRAVENOUS at 12:07

## 2020-07-22 RX ADMIN — PROPOFOL 20 MG: 10 INJECTION, EMULSION INTRAVENOUS at 12:07

## 2020-07-22 RX ADMIN — GLYCOPYRROLATE 0.2 MG: 0.2 INJECTION, SOLUTION INTRAMUSCULAR; INTRAVENOUS at 12:07

## 2020-07-22 RX ADMIN — CEFAZOLIN 2 G: 330 INJECTION, POWDER, FOR SOLUTION INTRAMUSCULAR; INTRAVENOUS at 12:07

## 2020-07-22 NOTE — DISCHARGE SUMMARY
[facility]  Discharge Note  Short Stay    Admit Date: 7/22/2020    Discharge Date and Time: 07/22/2020 2:14 PM      Attending Physician: Yong Anguiano MD     Discharge Provider: Marci Julien    Diagnoses:  Active Hospital Problems    Diagnosis  POA    *Urinary retention with incomplete bladder emptying [R33.9]  Yes      Resolved Hospital Problems   No resolved problems to display.       Discharged Condition: good    Hospital Course: Patient was admitted for stage 1 interstim placement and tolerated the procedure well with no complications. The patient was discharged home in good condition on the same day.       Final Diagnoses: Same as principal problem.    Disposition: Home or Self Care    Follow up/Patient Instructions:    Medications:  Reconciled Home Medications:   Current Discharge Medication List      START taking these medications    Details   cephALEXin (KEFLEX) 500 MG capsule Take 1 capsule (500 mg total) by mouth 2 (two) times a day. for 7 days  Qty: 14 capsule, Refills: 0      traMADoL (ULTRAM) 50 mg tablet Take 1 tablet (50 mg total) by mouth every 6 (six) hours as needed for Pain.  Qty: 5 tablet, Refills: 0    Comments: n/a          CONTINUE these medications which have NOT CHANGED    Details   ALPRAZolam (XANAX) 2 MG Tab Take 1 tablet (2 mg total) by mouth nightly as needed.  Qty: 30 tablet, Refills: 1    Associated Diagnoses: IC (interstitial cystitis); Chronic bladder pain      amitriptyline (ELAVIL) 25 MG tablet Take 1 tablet (25 mg total) by mouth every evening.  Qty: 30 tablet, Refills: 11      butalbital-acetaminophen-caffeine -40 mg (FIORICET, ESGIC) -40 mg per tablet Take 1-2 tablets by mouth every 6 (six) hours as needed for Headaches.  Qty: 50 tablet, Refills: 1    Associated Diagnoses: Intractable migraine without aura and without status migrainosus      famotidine (PEPCID) 40 MG tablet Take 1 tablet (40 mg total) by mouth nightly as needed (bladder  pain).  Qty: 30 tablet, Refills: 11    Associated Diagnoses: IC (interstitial cystitis); Gastroesophageal reflux disease, esophagitis presence not specified      !! FLOMAX 0.4 mg Cap TAKE 1 CAPSULE BY MOUTH EVERY DAY  Qty: 90 capsule, Refills: 3    Associated Diagnoses: Incomplete bladder emptying      hydrOXYzine HCL (ATARAX) 25 MG tablet Take 1 tablet (25 mg total) by mouth every evening.  Qty: 30 tablet, Refills: 11    Associated Diagnoses: Interstitial cystitis      meclizine (ANTIVERT) 25 mg tablet TAKE 1 TABLET BY MOUTH TWO TIMES DAILY FOR DIZZINESS  Refills: 0      oxybutynin (DITROPAN-XL) 10 MG 24 hr tablet Take 1 tablet (10 mg total) by mouth every evening.  Qty: 30 tablet, Refills: 11    Associated Diagnoses: OAB (overactive bladder)      oxyCODONE-acetaminophen (PERCOCET)  mg per tablet Take 1 tablet by mouth every 12 (twelve) hours as needed.  Refills: 0      promethazine (PHENERGAN) 50 MG tablet Take 50 mg by mouth every 12 (twelve) hours as needed.  Refills: 0      erenumab-aooe 140 mg/mL AtIn Inject 140 mg into the skin every 28 days.  Qty: 1 mL, Refills: 11    Associated Diagnoses: Intractable periodic headache syndrome      metoclopramide HCl (REGLAN) 10 MG tablet Take 1 tablet (10 mg total) by mouth 2 (two) times daily as needed (Headache and nausea).  Qty: 15 tablet, Refills: 11    Associated Diagnoses: Intractable periodic headache syndrome      phenazopyridine (PYRIDIUM) 200 MG tablet TAKE 1 TABLET BY MOUTH THREE TIMES DAILY AS NEEDED FOR PAIN  Qty: 30 tablet, Refills: 9    Comments: 03/20/2020 1:21:27 PM      !! tamsulosin (FLOMAX) 0.4 mg Cap Take 1 capsule (0.4 mg total) by mouth 2 (two) times a day.  Qty: 60 capsule, Refills: 11    Associated Diagnoses: Straining to void       !! - Potential duplicate medications found. Please discuss with provider.        Discharge Procedure Orders   Notify your health care provider if you experience any of the following:  temperature >100.4     Notify  your health care provider if you experience any of the following:  persistent nausea and vomiting or diarrhea     Notify your health care provider if you experience any of the following:  severe uncontrolled pain     Notify your health care provider if you experience any of the following:  redness, tenderness, or signs of infection (pain, swelling, redness, odor or green/yellow discharge around incision site)     Follow-up Information     Yong Anguiano MD In 1 week.    Specialty: Urology  Why: post op  Contact information:  [address]  [place] [address]  [phone]                   Discharge Procedure Orders (must include Diet, Follow-up, Activity):   Discharge Procedure Orders (must include Diet, Follow-up, Activity)   Notify your health care provider if you experience any of the following:  temperature >100.4     Notify your health care provider if you experience any of the following:  persistent nausea and vomiting or diarrhea     Notify your health care provider if you experience any of the following:  severe uncontrolled pain     Notify your health care provider if you experience any of the following:  redness, tenderness, or signs of infection (pain, swelling, redness, odor or green/yellow discharge around incision site)

## 2020-07-22 NOTE — OP NOTE
[facility]  Operative Note    Date: 07/22/2020    Pre-Op Diagnosis: incomplete bladder emptying, recurrent UTI, interstitial cystitis     Patient Active Problem List    Diagnosis Date Noted    Urinary retention with incomplete bladder emptying 07/22/2020    Recurrent UTI 07/07/2020    Chronic constipation 07/07/2020    Strains to urinate 05/29/2020    Interstitial cystitis 03/13/2019    GERD (gastroesophageal reflux disease) 03/04/2019    Bladder pain 04/13/2018    IC (interstitial cystitis) 08/16/2017    Acute UTI 07/28/2017    Nocturia 07/25/2017    Dizzy 10/04/2016    History of diverticulitis 01/27/2016    Abdominal pain, RLQ (right lower quadrant) 01/14/2016    Intractable migraine without aura and without status migrainosus 10/07/2015    Migraine without aura, with intractable migraine, so stated, without mention of status migrainosus 06/26/2015    Anxiety state 06/26/2015    Memory loss 06/26/2015    Incomplete bladder emptying 06/17/2014    Right flank pain 06/17/2014       Post-Op Diagnosis: same    Procedure(s) Performed:   1.  Incision for implantation of sacral nerve neurostimulator electrodes (transforaminal placement) (12446)  2.  Fluoro < 1 h (21962)    Specimen(s): none    Staff Surgeon: Yong Anguiano MD    Assistant Surgeon: Marci Julien MD    Anesthesia: General MAC anesthesia    Indications: Elana Gonzales is a 45 y.o. female with incomplete bladder emptying, interstitial cystitis, and recurrent UTIs. She has tried and failed multiple medical therapies.     Findings:   Lead placed on right side  Good sensory and motor function consistent with S3 stimulation seen  The rolling pen technique was used to localize S3    Estimated Blood Loss: minimal    Drains: none    Complications:  None    Procedure in Detail:  After informed consent was obtained, the patient was transferred to the operating room and placed in the prone position.  Pillows were placed under  lower abdomen to flatten sacrum and under shins to allow the toes to dangle freely.  Anesthesia was administered.  The patient was prepped and draped in the usual sterile fashion and preoperative antibiotics were confirmed.  Timeout was performed.      The C-arm was draped and moved into AP position to provide fluoroscopic mapping of the sacral region.  A pen was allowed to roll in the midline of the sacrum until it balanced horizontally.  The fulcrum point was marked as the level of S3.  A point 2 cm lateral and 2 cm superior to the marked S3 level was marked as the entry point for the foramen needle.  1% lidocaine with epinephrine was injected into this area where the finder needles were to be placed.  A foramen needle was introduced until the S3 foramen was identified and penetrated.  The depth of the needle was confirmed and adjusted fluoroscopically.  Proper needle position was confirmed by patient identification of location of sensation, direct observation of the lifting of the perineum and observation of plantar flexion of the great toe utilizing the external test stimulator.  The foramen needle stylet was removed and a directional guide was placed and confirmed fluoroscopically.  The needle was removed keeping the directional guide in place.  An incision was made peripherally to the directional guide through the fascial layer.  The lead introducer sheath with dilator was placed over the directional guide and directed into the foramen ensuring the radiopaque marker did not extend beyond the anterior edge of the sacrum.  The dilator was unlocked and removed along with the directional guide keeping the introducer sheath in place.     The lead was then placed through the introducer sheath to the first white line.  Position was checked fluoroscopically.  The lead was then further introduced until 3 electrodes were visible below the sacrum.  Each electrode was tested for location of patient sensation,  visualization of balbir, and plantar flexion of the great toe.  After satisfactory positioning was confirmed, the introducer sheath was retracted under continuous fluoro, deploying lead tines into the perisacral tissue.      A 3 cm incision was made sharply using a 15 blade into subcutaneous tissue posterior to the iliac crest and lateral to the sacrum.  Bovie electrocautery and blunt dissection was used until the gluteal fascia was identified.  Hemostasis was excellent.  This created the pocket.      A tunneling trocar with sheath was placed from the lead exit site subcutaneously to the incised pocket site.  The trocar was removed and the lead was fed through the sheath and pulled out at the pocket site.  The lead was cleansed of bodily fluids, dried and a protective boot was placed over the lead.  The lead was inserted into the temporary percutaneous extension and the metal bands were aligned.  The 4 setscrews were tightened with the hex wrench.  The boot was pushed over the connection and a 2-0 silk tie secured the connection at the boot grooves on either side.      Another tunnel was made subcutaneously using the tunneling trocar to a puncture site above the contralateral buttock.  The percutaneous extension was placed through the sheath and exposed and connected to the twist lock gray cable.  The wounds were irrigated with bacitracin solution.  They were then closed in a 2 layers fashion using 3-0 vicryl deep dermal sutures and a running 4-0 monocryl subcuticular suture.  Mastisol, steristips, a telfa and tegaderm were then used as dressings.      The patient tolerated the procedure well and was transferred to the recovery room in stable condition.      Disposition:  The patient will follow up with Dr. Anguiano in 1 week.  Prescriptions were given for tramadol and keflex.  The patient will meet with the [facility] in the recovery room.      Marci Julien MD

## 2020-07-22 NOTE — ANESTHESIA POSTPROCEDURE EVALUATION
Anesthesia Post Evaluation    Patient: Elana Gonzales    Procedure(s) Performed: Procedure(s) (LRB):  INSERTION, NEUROSTIMULATOR, TEMPORARY, SACRAL flouro (N/A)  FLUOROSCOPY  PROGRAMMING-STIMULATOR    Final Anesthesia Type: MAC    Patient location during evaluation: PACU  Patient participation: Yes- Able to Participate  Level of consciousness: awake and alert  Post-procedure vital signs: reviewed and stable  Pain management: adequate  Airway patency: patent    PONV status at discharge: No PONV  Anesthetic complications: no      Cardiovascular status: hemodynamically stable  Respiratory status: unassisted and spontaneous ventilation  Hydration status: euvolemic  Follow-up not needed.          Vitals Value Taken Time   /67 07/22/20 1446   Temp 36.5 °C (97.7 °F) 07/22/20 1415   Pulse 99 07/22/20 1448   Resp 18 07/22/20 1447   SpO2 97 % 07/22/20 1448   Vitals shown include unvalidated device data.      No case tracking events are documented in the log.      Pain/Reyes Score: Reyes Score: 10 (7/22/2020  2:15 PM)

## 2020-07-22 NOTE — TRANSFER OF CARE
"Anesthesia Transfer of Care Note    Patient: Elana Gonzales    Procedure(s) Performed: Procedure(s) (LRB):  INSERTION, NEUROSTIMULATOR, TEMPORARY, SACRAL flouro (N/A)  FLUOROSCOPY  PROGRAMMING-STIMULATOR    Patient location: [facility]    Anesthesia Type: MAC    Transport from OR: Transported from OR on 6-10 L/min O2 by face mask with adequate spontaneous ventilation    Post pain: adequate analgesia    Post assessment: no apparent anesthetic complications and tolerated procedure well    Post vital signs: stable    Level of consciousness: awake and alert    Nausea/Vomiting: no nausea/vomiting    Complications: none    Transfer of care protocol was followed      Last vitals:   Visit Vitals  /75   Pulse 86   Temp 36.4 °C (97.6 °F) (Oral)   Resp 16   Ht 5' 4" (1.626 m)   Wt 90.7 kg (200 lb)   LMP 08/26/2008   SpO2 97%   Breastfeeding No   BMI 34.33 kg/m²     "

## 2020-07-22 NOTE — ANESTHESIA PREPROCEDURE EVALUATION
07/22/2020  Elana Gonzales is a 45 y.o., female.      Anesthesia Evaluation          Review of Systems  Anesthesia Hx:  Hx of Anesthetic complications Denies Family Hx of Anesthesia complications.  Personal Hx of Anesthesia complications, Post-Operative Nausea/Vomiting, in the past, but not with recent anesthetics / prophylaxis   Social:  No Alcohol Use, Non-Smoker    Cardiovascular:   Exercise tolerance: good Walking daily-all day per patient/Housework   Pulmonary:   Sleep Apnea, CPAP (not using at present)/snoring   Renal/:   Nocturia/ IC Bladder pain/OAB   Hepatic/GI:   GERD HX of diverticulitis   Musculoskeletal:   History: left foot fracture-4/2019-developed blood clot-left leg-took Eliquis-resolved   OB/GYN/PEDS:  S/P-Hysterectomy     Neurological:   Headaches Migraines   Psych:   anxiety      Yoselin Ozuna RN  7/15/20         Anesthesia Assessment: Preoperative EQUATION    Planned Procedure: Procedure(s) (LRB):  INSERTION, NEUROSTIMULATOR, TEMPORARY, SACRAL flouro (N/A)  Requested Anesthesia Type:Monitor Anesthesia Care  Surgeon: Yong Anguiano MD  Service: Urology  Known or anticipated Date of Surgery:7/22/2020    Surgeon notes: reviewed and Incomplete bladder emptying    Previous anesthesia records:6/10/20-Cystoscopy,with BladderHydrodistension Cystosopy, with Botulinum ToxinInfection Instillation, Bladder-General- no apparent anesthetic complications and tolerated procedure well    Last PCP note: outside [facility] , Dr. Roland S. Waguespack, Jr.-MILTON Reid  Subspecialty notes: Gastroenterology, Neurology    Other important co-morbidities: GERD, DANIEL and Hx DVT/ Obesity    Medical History    Diagnosis Date Comment Source   Anxiety   Provider   Dizziness   Provider   GERD (gastroesophageal reflux disease)   Provider   H. pylori infection   Provider   Interstitial cystitis   Provider   Migraine  headache   Provider   PONV (postoperative nausea and vomiting)  x1 after Breast Reduction surgery Provider       Tests already available:  Results have been reviewed. Labs-6/8/20-COVID-19 Screening/ 5/12/20-BMP/ EKG-11/7/19  Plan:Phone pending      Testing:  None Needed     Patient  has previously scheduled Medical Appointment:Appointment on 7/19/20, prior to the surgery date.    Navigation: Phone Completed                                    Consults scheduled.None needed at this time.                           Straight Line to surgery.               No tests, anesthesia preop clinic visit, or consult required.    Yoselin Ozuna RN   7/15/20      Anesthesia Plan  Type of Anesthesia, risks & benefits discussed:  Anesthesia Type:  general, MAC  Patient's Preference:   Intra-op Monitoring Plan: standard ASA monitors  Intra-op Monitoring Plan Comments:   Post Op Pain Control Plan: per primary service following discharge from PACU and multimodal analgesia  Post Op Pain Control Plan Comments:   Induction:   IV  Beta Blocker:  Patient is not currently on a Beta-Blocker (No further documentation required).       Informed Consent: Patient understands risks and agrees with Anesthesia plan.  Questions answered. Anesthesia consent signed with patient.  ASA Score: 2     Day of Surgery Review of History & Physical:    H&P update referred to the surgeon.     Anesthesia Plan Notes: Patient anxious about having various depths of anesthesia for procedure.  Discussed need to be light to converse with surgeon during procedure at certain points.  Patient understands and consents to anesthesia.  All questions answered        Ready For Surgery From Anesthesia Perspective.

## 2020-07-22 NOTE — PLAN OF CARE
Pt given dc instructions. Scripts delivered to bedside. Pt with no c/o pain, no nausea. Rep did see pt before dc.

## 2020-07-22 NOTE — INTERVAL H&P NOTE
The patient has been examined and the H&P has been reviewed:    I concur with the findings and no changes have occurred since H&P was written.     Will treat nit + urine today. Will proceed with surgery as we will not be instrumenting urinary tract. Gentamicin added to pre-op antibiotics.     Anesthesia/Surgery risks, benefits and alternative options discussed and understood by patient/family.          Active Hospital Problems    Diagnosis  POA    Urinary retention with incomplete bladder emptying [R33.9]  Yes      Resolved Hospital Problems   No resolved problems to display.

## 2020-07-25 LAB — BACTERIA UR CULT: ABNORMAL

## 2020-07-29 ENCOUNTER — TELEPHONE (OUTPATIENT)
Dept: UROLOGY | Facility: CLINIC | Age: 45
End: 2020-07-29

## 2020-07-29 DIAGNOSIS — Z01.818 PREOP EXAMINATION: ICD-10-CM

## 2020-07-29 DIAGNOSIS — R33.9 INCOMPLETE BLADDER EMPTYING: Primary | ICD-10-CM

## 2020-07-29 NOTE — TELEPHONE ENCOUNTER
Incomplete bladder emptying  -     Case Request Operating Room: INSERTION, NEUROSTIMULATOR, PERMANENT, SACRAL  STAGE 2

## 2020-07-29 NOTE — TELEPHONE ENCOUNTER
Incomplete bladder emptying  -     COVID-19 Routine Screening; Future; Expected date: 08/02/2020    Preop examination  -     COVID-19 Routine Screening; Future; Expected date: 08/02/2020

## 2020-07-29 NOTE — TELEPHONE ENCOUNTER
S/p stage I InterStim Therapy  Reports greater than 85 to 90 % improvement in her urinary symptoms and would like to proceed with Stage 2 to complete the Therapy.  Frequency down to 9 from 20 x a day, nocturia down to 0 from 2 x, and urgency down to 2 from 4 x.    Please schedule Stage 2 InterStim Therapy in OR on 8/5/20?  45 mins under MAC.    Thank you,  Dr. Anguiano

## 2020-08-02 ENCOUNTER — LAB VISIT (OUTPATIENT)
Dept: URGENT CARE | Facility: CLINIC | Age: 45
End: 2020-08-02
Payer: COMMERCIAL

## 2020-08-02 VITALS — OXYGEN SATURATION: 97 % | TEMPERATURE: 98 F | HEART RATE: 70 BPM

## 2020-08-02 DIAGNOSIS — R33.9 INCOMPLETE BLADDER EMPTYING: ICD-10-CM

## 2020-08-02 DIAGNOSIS — Z01.818 PREOP EXAMINATION: ICD-10-CM

## 2020-08-02 PROCEDURE — U0003 INFECTIOUS AGENT DETECTION BY NUCLEIC ACID (DNA OR RNA); SEVERE ACUTE RESPIRATORY SYNDROME CORONAVIRUS 2 (SARS-COV-2) (CORONAVIRUS DISEASE [COVID-19]), AMPLIFIED PROBE TECHNIQUE, MAKING USE OF HIGH THROUGHPUT TECHNOLOGIES AS DESCRIBED BY CMS-2020-01-R: HCPCS

## 2020-08-02 NOTE — ANESTHESIA PAT ROS NOTE
"                                                                                                             08/02/2020  Elana Gonzales is a 45 y.o., female.      Pre-op Assessment          Review of Systems  Anesthesia Hx:  Hx of Anesthetic complications  Denies Family Hx of Anesthesia complications.  Personal Hx of Anesthesia complications, Post-Operative Nausea/Vomiting, in the past, but not with recent anesthetics / prophylaxis   Social:  Non-Smoker, No Alcohol Use    Cardiovascular:   Exercise tolerance: good Walking daily-all day per patient/Housework   Pulmonary:   Sleep Apnea, CPAP (not using at present/snoring)   Renal/:   Nocturial/IC Bladder pain/OAB   Hepatic/GI:   GERD Hx of diverticulitis   Musculoskeletal:   Left foot fracture-4/2019-developed blood clot-left leg-took Eliquis-resolved   OB/GYN/PEDS:  S/P-Hysterectomy   Neurological:   Headaches Migraine   Psych:   anxiety      Yoselin Ozuna, RN 8/2/20       Anesthesia Assessment: Preoperative EQUATION    Planned Procedure: Procedure(s) (LRB):  INSERTION, NEUROSTIMULATOR, PERMANENT, SACRAL STAGE 2 (N/A)  Requested Anesthesia Type:Monitor Anesthesia Care  Surgeon: Yong Anguiano MD  Service: Urology  Known or anticipated Date of Surgery:8/5/2020    Surgeon notes: reviewed and Incomplete bladder emptying       Previous anesthesia records: 7/22/20-Insertion, Neurostimulator, Temporary, Sacral Fluoroscopy Programming-Stimulator-General-no apparent complications and tolerated procedure well  Anesthesia Hx: Pending copy & past hx     Last PCP note: outside [facility], Dr. Roland S. Waguespack, Jr, MILTON Reid    Subspecialty notes: Gastroenterology, Neurology    Other important co-morbidities:GERD, DANIEL and Hx DVT/Obesity          Medical History-Pending Copy & past info:Co-morbities              Tests already available:  Lab-8/2/20-COVID-19 -Screening -"in process"/ 7/19/20-COVID-19 Screening/6/8/20-COVID-19 Screening/ Lab-5/12/20-BMP             "    Plan:Phone pending       Testing: None needed at this time.       Patient  has previously scheduled Medical Appointment: None    Navigation:Phone                     Consults scheduled.None needed at this time.                             Straight Line to surgery.               No tests, anesthesia preop clinic visit, or consult required.                                 Yoselin Ozuna RN 8/2/20

## 2020-08-03 ENCOUNTER — RESEARCH ENCOUNTER (OUTPATIENT)
Dept: UROLOGY | Facility: CLINIC | Age: 45
End: 2020-08-03

## 2020-08-03 LAB — SARS-COV-2 RNA RESP QL NAA+PROBE: NOT DETECTED

## 2020-08-03 NOTE — PROGRESS NOTES
DR. PRICE'S [employer]  PSR STUDY. [id number]  8/3/2020    Spoke with patient today regarding this study. She will be receiving Inter Stim Stage 2 on August 5, 2020.     She is very interested in participating in this study and has given verbal consent.     Study team will meet her prior to surgery on 8/5/2020 to obtain written consent.

## 2020-08-03 NOTE — ANESTHESIA PAT ROS NOTE
08/03/2020  Elana Gonzales is a 45 y.o., female.      Pre-op Assessment          Review of Systems  Anesthesia Hx:  Hx of Anesthetic complications  Denies Family Hx of Anesthesia complications.  Personal Hx of Anesthesia complications, Post-Operative Nausea/Vomiting, in the past, but not with recent anesthetics / prophylaxis   Social:  Non-Smoker, No Alcohol Use    Hematology/Oncology:        Hematology Comments: History: left foot fracture-4/2019-developed blood clot-left leg-took Eliquis-resolved-No DVT at present   Cardiovascular:   Exercise tolerance: good Walking daily-all day on job,  per patient/housework   Pulmonary:   Sleep Apnea, CPAP (not using at present)/snoring   Renal/:   Nocturia/ IC Bladder pain/OAB   Hepatic/GI:   GERD HX of Diverticulitis   Musculoskeletal:   History: left foot fracture-4/2019-developed blood clot-left leg-took Eliquis-resolved-No DVT at present   OB/GYN/PEDS:  S/P-Hysterectomy   Neurological:   Headaches Migraines   Psych:   anxiety      Yoselin Ozuna RN  8/3/20       Anesthesia Assessment: Preoperative EQUATION    Planned Procedure: Procedure(s) (LRB):  INSERTION, NEUROSTIMULATOR, PERMANENT, SACRAL STAGE 2 (N/A)  Requested Anesthesia Type:Monitor Anesthesia Care  Surgeon: Yong Anguiano MD  Service: Urology  Known or anticipated Date of Surgery:8/5/2020    Surgeon notes: reviewed and Incomplete bladder emptying    Previous anesthesia records:7/22/20- INSERTION, NEUROSTIMULATOR, TEMPORARY, SACRAL flouro (N/A Back) Fluoroscopy(abdomen) Programming-Stimulator-MAC-no apparent anesthetic complications and tolerated procedure well  Anesthesia Hx:  Hx of Anesthetic complications Denies Family Hx of Anesthesia complications.  Personal Hx of Anesthesia complications, Post-Operative Nausea/Vomiting, in the past, but not with recent anesthetics / prophylaxis     Last PCP note:  outside [facility] , Dr. Roland S. Waguespack, Jr.-MILTON Reid  Subspecialty notes: Gastroenterology, Neurology    Other important co-morbidities: GERD, DANIEL and Hx DVT/ Obesity     Medical History     Diagnosis Date Comment Source   Anxiety     Provider   Dizziness     Provider   GERD (gastroesophageal reflux disease)     Provider   H. pylori infection     Provider   Interstitial cystitis     Provider   Migraine headache     Provider   PONV (postoperative nausea and vomiting)   x1 after Breast Reduction surgery Provider        Tests already available:  Results have been reviewed. Labs-6/8/20-COVID-19 Screening/5/12/20-BMP      Plan: Phone pending    Testing:  None Needed      Patient  has previously scheduled Medical Appointment:None    Navigation: Phone Completed                        Consults scheduled.None needed at this time.                           Straight Line to surgery.               No tests, anesthesia preop clinic visit, or consult required.                           Yoselin Ozuna RN 8/3/20

## 2020-08-04 ENCOUNTER — ANESTHESIA EVENT (OUTPATIENT)
Dept: SURGERY | Facility: HOSPITAL | Age: 45
End: 2020-08-04
Payer: COMMERCIAL

## 2020-08-04 ENCOUNTER — TELEPHONE (OUTPATIENT)
Dept: UROLOGY | Facility: CLINIC | Age: 45
End: 2020-08-04

## 2020-08-04 NOTE — TELEPHONE ENCOUNTER
Called pt to confirm arrival time of 11:30 for procedure on 8/5/2020. Gave pt NPO instructions and gave pt opportunity to ask questions. Pt verbalized understanding.

## 2020-08-04 NOTE — ANESTHESIA PREPROCEDURE EVALUATION
[facility]  Anesthesia Pre-Operative Evaluation         Patient Name: Elana Gonzales  YOB: 1975  MRN: [id number]    SUBJECTIVE:     Pre-operative evaluation for Procedure(s) (LRB):  INSERTION, NEUROSTIMULATOR, PERMANENT, SACRAL STAGE 2 (N/A)     08/04/2020    Elana Gonzales is a 45 y.o. female w/ a significant PMHx of anxiety, migraines, GERD, sleep apnea, and DVT (2019, resolved, off eliquis) who presents with recurrent UTI, hx of incomplete bladder emptying, interstitial cystitis, and chronic constipation s/p stage 1 of sacral neurostim insertion (7/22/20).    Patient now presents for the above procedure(s).    *Hx of PONV in the past, but not with recent anesthesia/prophylaxis.    LDA: None documented    Prev airway:   Present Prior to Hospital Arrival?: No; Placement Date: 03/13/19; Placement Time: 1058; Inserted by: Other (RENEA Pena); Airway Device: LMA; Mask Ventilation: Easy; Intubated: Postinduction; Airway Device Size: 4.0; Style: Cuffed; Cuff Inflation: Minimal occlusive pressure; Placement Verified By: Auscultation, Capnometry; Complicating Factors: None;    Drips: None documented.       Patient Active Problem List   Diagnosis    Incomplete bladder emptying    Right flank pain    Migraine without aura, with intractable migraine, so stated, without mention of status migrainosus    Anxiety state    Memory loss    Intractable migraine without aura and without status migrainosus    Abdominal pain, RLQ (right lower quadrant)    History of diverticulitis    Dizzy    Nocturia    Acute UTI    IC (interstitial cystitis)    Bladder pain    GERD (gastroesophageal reflux disease)    Interstitial cystitis    Strains to urinate    Recurrent UTI    Chronic constipation    Urinary retention with incomplete bladder emptying       Review of patient's allergies indicates:   Allergen Reactions     Bactrim [sulfamethoxazole-trimethoprim] Other (See Comments)     headache    Codeine Other (See Comments)     Other reaction(s): Vomiting       Current Outpatient Medications:  No current facility-administered medications for this encounter.     Current Outpatient Medications:     ALPRAZolam (XANAX) 2 MG Tab, Take 1 tablet (2 mg total) by mouth nightly as needed., Disp: 30 tablet, Rfl: 1    amitriptyline (ELAVIL) 25 MG tablet, Take 1 tablet (25 mg total) by mouth every evening., Disp: 30 tablet, Rfl: 11    butalbital-acetaminophen-caffeine -40 mg (FIORICET, ESGIC) -40 mg per tablet, Take 1-2 tablets by mouth every 6 (six) hours as needed for Headaches., Disp: 50 tablet, Rfl: 1    erenumab-aooe 140 mg/mL AtIn, Inject 140 mg into the skin every 28 days. (Patient taking differently: Inject 140 mg into the skin every 28 days. ), Disp: 1 mL, Rfl: 11    famotidine (PEPCID) 40 MG tablet, Take 1 tablet (40 mg total) by mouth nightly as needed (bladder pain)., Disp: 30 tablet, Rfl: 11    FLOMAX 0.4 mg Cap, TAKE 1 CAPSULE BY MOUTH EVERY DAY (Patient taking differently: DO NOT CRUSH OR CHEW; SWALLOW WHOLE.), Disp: 90 capsule, Rfl: 3    hydrOXYzine HCL (ATARAX) 25 MG tablet, Take 1 tablet (25 mg total) by mouth every evening., Disp: 30 tablet, Rfl: 11    meclizine (ANTIVERT) 25 mg tablet, TAKE 1 TABLET BY MOUTH TWO TIMES DAILY FOR DIZZINESS, Disp: , Rfl: 0    metoclopramide HCl (REGLAN) 10 MG tablet, Take 1 tablet (10 mg total) by mouth 2 (two) times daily as needed (Headache and nausea)., Disp: 15 tablet, Rfl: 11    oxybutynin (DITROPAN-XL) 10 MG 24 hr tablet, Take 1 tablet (10 mg total) by mouth every evening., Disp: 30 tablet, Rfl: 11    oxyCODONE-acetaminophen (PERCOCET)  mg per tablet, Take 1 tablet by mouth every 12 (twelve) hours as needed., Disp: , Rfl: 0    phenazopyridine (PYRIDIUM) 200 MG tablet, TAKE 1 TABLET BY MOUTH THREE TIMES DAILY AS NEEDED FOR PAIN, Disp: 30 tablet, Rfl: 9     promethazine (PHENERGAN) 50 MG tablet, Take 50 mg by mouth every 12 (twelve) hours as needed., Disp: , Rfl: 0    tamsulosin (FLOMAX) 0.4 mg Cap, Take 1 capsule (0.4 mg total) by mouth 2 (two) times a day., Disp: 60 capsule, Rfl: 11    Past Surgical History:   Procedure Laterality Date    BREAST SURGERY      2017     SECTION, CLASSIC      COLONOSCOPY N/A 2016    Procedure: COLONOSCOPY;  Surgeon: Phuong Carrillo MD;  Location: [facility];  Service: Endoscopy;  Laterality: N/A;    CYSTOSCOPY WITH HYDRODISTENSION OF BLADDER N/A 3/13/2019    Procedure: CYSTOSCOPY, WITH BLADDER HYDRODISTENSION;  Surgeon: Yong Anguiano MD;  [facility];  Service: Urology;  Laterality: N/A;  45 min    CYSTOSCOPY WITH HYDRODISTENSION OF BLADDER N/A 2020    Procedure: CYSTOSCOPY, WITH BLADDER HYDRODISTENSION;  Surgeon: Yong Anguiano MD;  [facility];  Service: Urology;  Laterality: N/A;  45 min    CYSTOSCOPY WITH HYDRODISTENSION OF BLADDER N/A 6/10/2020    Procedure: CYSTOSCOPY, WITH BLADDER HYDRODISTENSION;  Surgeon: Yong Anguiano MD;  [facility];  Service: Urology;  Laterality: N/A;  1hr    ESOPHAGOGASTRODUODENOSCOPY N/A 3/4/2019    Procedure: ESOPHAGOGASTRODUODENOSCOPY (EGD);  Surgeon: Luis Main MD;  Location: [facility] (4TH FLR);  Service: Endoscopy;  Laterality: N/A;    FLUOROSCOPY  2020    Procedure: FLUOROSCOPY;  Surgeon: Yong Anguiano MD;  Location: [facility] OR 2ND FLR;  Service: Urology;;    HYSTERECTOMY      INJECTION OF BOTULINUM TOXIN TYPE A N/A 2018    Procedure: INJECTION, BOTULINUM TOXIN, TYPE A 100 UNITS;  Surgeon: Yong Anguiano MD;  Location: [facility] OR 1ST FLR;  Service: Urology;  Laterality: N/A;    INJECTION OF BOTULINUM TOXIN TYPE A N/A 3/13/2019    Procedure: INJECTION, BOTULINUM TOXIN, TYPE A 100 UNITS;  Surgeon: Yong Anguiano MD;  Location: [facility] OR 2ND FLR;  Service: Urology;  Laterality: N/A;    INSTILLATION OF URINARY BLADDER  N/A 8/1/2018    Procedure: INSTILLATION, URINARY BLADDER;  Surgeon: Yong Anguiano MD;  Location: [facility] OR 1ST FLR;  Service: Urology;  Laterality: N/A;    INSTILLATION OF URINARY BLADDER N/A 3/13/2019    Procedure: INSTILLATION, BLADDER;  Surgeon: Yong Anguiano MD;  Location: [facility] OR 2ND FLR;  Service: Urology;  Laterality: N/A;    INSTILLATION OF URINARY BLADDER N/A 5/27/2020    Procedure: INSTILLATION, BLADDER;  Surgeon: Yong Anguiano MD;  Location: [facility] OR 1ST FLR;  Service: Urology;  Laterality: N/A;    INSTILLATION OF URINARY BLADDER  6/10/2020    Procedure: INSTILLATION, BLADDER;  Surgeon: Yong Anguiano MD;  Location: [facility] OR 1ST FLR;  Service: Urology;;    OOPHORECTOMY      TONSILLECTOMY      UPPER GASTROINTESTINAL ENDOSCOPY         Social History     Socioeconomic History    Marital status:      Spouse name: Not on file    Number of children: Not on file    Years of education: Not on file    Highest education level: Not on file   Occupational History    Not on file   Social Needs    Financial resource strain: Not on file    Food insecurity     Worry: Not on file     Inability: Not on file    Transportation needs     Medical: Not on file     Non-medical: Not on file   Tobacco Use    Smoking status: Never Smoker    Smokeless tobacco: Never Used   Substance and Sexual Activity    Alcohol use: No     Alcohol/week: 0.0 standard drinks    Drug use: No    Sexual activity: Yes     Partners: Male   Lifestyle    Physical activity     Days per week: Not on file     Minutes per session: Not on file    Stress: Not on file   Relationships    Social connections     Talks on phone: Not on file     Gets together: Not on file     Attends [employer] service: Not on file     Active member of club or organization: Not on file     Attends meetings of clubs or organizations: Not on file     Relationship status: Not on file   Other Topics Concern    Not on file   Social History Narrative    Not on  file       OBJECTIVE:     Vital Signs Range (Last 24H):         Significant Labs:  Lab Results   Component Value Date    WBC 9.03 03/04/2019    HGB 12.9 03/04/2019    HCT 39.5 03/04/2019     03/04/2019    CHOL 211 (H) 08/03/2007    TRIG 72 08/03/2007    HDL 94 (H) 08/03/2007    ALT 27 03/04/2019    AST 24 03/04/2019     05/12/2020    K 3.8 05/12/2020     05/12/2020    CREATININE 0.74 05/12/2020    BUN 19 (H) 05/12/2020    CO2 25 05/12/2020    TSH 1.5 08/03/2007    INR 0.9 09/08/2016       Diagnostic Studies: No relevant studies.    EKG:   Results for orders placed or performed during the hospital encounter of 09/08/16   EKG 12-lead    Collection Time: 09/08/16  2:51 PM    Narrative    Test Reason : R42  Blood Pressure : **/** mmHG  Vent. Rate : 081 BPM     Atrial Rate : 081 BPM     P-R Int : 156 ms          QRS Dur : 080 ms      QT Int : 374 ms       P-R-T Axes : 054 002 037 degrees     QTc Int : 434 ms    Normal sinus rhythm  Normal ECG  When compared with ECG of 01-SEP-2016 10:06,  No significant change was found  Confirmed by ANDREW RIVERO MD ([id number]) on 9/9/2016 8:32:18 AM    Referred By: SELF REFERRAL           Confirmed By:ANDREW RIVERO MD       2D ECHO:  TTE:  No results found for this or any previous visit.    SANDRA:  No results found for this or any previous visit.    ASSESSMENT/PLAN:         Anesthesia Evaluation    I have reviewed the Patient Summary Reports.    I have reviewed the Nursing Notes. I have reviewed the NPO Status.   I have reviewed the Medications.     Review of Systems  Anesthesia Hx:  Hx of Anesthetic complications  History of prior surgery of interest to airway management or planning: Denies Family Hx of Anesthesia complications.  Personal Hx of Anesthesia complications, Post-Operative Nausea/Vomiting, with every anesthetic, despite treatment   Social:  Non-Smoker, No Alcohol Use    Hematology/Oncology:        Hematology Comments: History: left foot  fracture-4/2019-developed blood clot-left leg-took Eliquis-resolved-No DVT at present   Cardiovascular:   Exercise tolerance: good Denies Hypertension.  Denies CABG/stent.   Denies Angina. ECG has been reviewed. Walking daily-all day on job,  per patient/housework   Pulmonary:   Sleep Apnea, CPAP (not using at present)/snoring   Renal/:   Nocturia/ IC Bladder pain/OAB   Hepatic/GI:   GERD HX of Diverticulitis   Musculoskeletal:   History: left foot fracture-4/2019-developed blood clot-left leg-took Eliquis-resolved-No DVT at present   OB/GYN/PEDS:  S/P-Hysterectomy   Neurological:   Denies CVA. Headaches Denies Seizures. Migraines   Endocrine:   Denies Diabetes.    Psych:   anxiety          Physical Exam  General:  Obesity    Airway/Jaw/Neck:  Airway Findings: Mouth Opening: Normal Tongue: Normal  Mallampati: I  TM Distance: Normal, at least 6 cm  Jaw/Neck Findings:  Neck ROM: Normal ROM  Neck Findings:  Girth Increased, Short Neck      Dental:  Dental Findings: In tact   Chest/Lungs:  Chest/Lungs Findings: Normal Respiratory Rate     Heart/Vascular:  Heart Findings: Rhythm: Regular Rhythm        Mental Status:  Mental Status Findings:  Cooperative, Alert and Oriented         Anesthesia Plan  Type of Anesthesia, risks & benefits discussed:  Anesthesia Type:  MAC, general  Patient's Preference:   Intra-op Monitoring Plan: standard ASA monitors  Intra-op Monitoring Plan Comments:   Post Op Pain Control Plan: multimodal analgesia, IV/PO Opioids PRN and per primary service following discharge from PACU  Post Op Pain Control Plan Comments:   Induction:   IV  Beta Blocker:  Patient is not currently on a Beta-Blocker (No further documentation required).       Informed Consent:    ASA Score: 2     Day of Surgery Review of History & Physical:    H&P update referred to the surgeon.         Ready For Surgery From Anesthesia Perspective.

## 2020-08-05 ENCOUNTER — RESEARCH ENCOUNTER (OUTPATIENT)
Dept: RESEARCH | Facility: HOSPITAL | Age: 45
End: 2020-08-05

## 2020-08-05 ENCOUNTER — HOSPITAL ENCOUNTER (OUTPATIENT)
Facility: HOSPITAL | Age: 45
Discharge: HOME | End: 2020-08-05
Attending: UROLOGY | Admitting: UROLOGY
Payer: COMMERCIAL

## 2020-08-05 ENCOUNTER — ANESTHESIA (OUTPATIENT)
Dept: SURGERY | Facility: HOSPITAL | Age: 45
End: 2020-08-05
Payer: COMMERCIAL

## 2020-08-05 VITALS
DIASTOLIC BLOOD PRESSURE: 59 MMHG | BODY MASS INDEX: 34.55 KG/M2 | SYSTOLIC BLOOD PRESSURE: 101 MMHG | TEMPERATURE: 98 F | HEART RATE: 75 BPM | WEIGHT: 195 LBS | OXYGEN SATURATION: 99 % | RESPIRATION RATE: 16 BRPM | HEIGHT: 63 IN

## 2020-08-05 DIAGNOSIS — R33.9 INCOMPLETE BLADDER EMPTYING: Primary | ICD-10-CM

## 2020-08-05 DIAGNOSIS — N39.0 RECURRENT UTI: ICD-10-CM

## 2020-08-05 DIAGNOSIS — N30.10 IC (INTERSTITIAL CYSTITIS): ICD-10-CM

## 2020-08-05 PROCEDURE — 95971 PR ANALYZE NEUROSTIM,SIMPLE/PROG: ICD-10-PCS | Mod: 59,,, | Performed by: UROLOGY

## 2020-08-05 PROCEDURE — 37000008 HC ANESTHESIA 1ST 15 MINUTES: Performed by: UROLOGY

## 2020-08-05 PROCEDURE — 25000003 PHARM REV CODE 250: Performed by: STUDENT IN AN ORGANIZED HEALTH CARE EDUCATION/TRAINING PROGRAM

## 2020-08-05 PROCEDURE — 94761 N-INVAS EAR/PLS OXIMETRY MLT: CPT

## 2020-08-05 PROCEDURE — 64590 INS/RPL PRPH SAC/GSTR NPG/R: CPT | Mod: 58,,, | Performed by: UROLOGY

## 2020-08-05 PROCEDURE — 36000708 HC OR TIME LEV III 1ST 15 MIN: Performed by: UROLOGY

## 2020-08-05 PROCEDURE — D9220A PRA ANESTHESIA: Mod: ,,, | Performed by: ANESTHESIOLOGY

## 2020-08-05 PROCEDURE — C1767 GENERATOR, NEURO NON-RECHARG: HCPCS | Performed by: UROLOGY

## 2020-08-05 PROCEDURE — 71000033 HC RECOVERY, INTIAL HOUR: Performed by: UROLOGY

## 2020-08-05 PROCEDURE — 63600175 PHARM REV CODE 636 W HCPCS: Performed by: STUDENT IN AN ORGANIZED HEALTH CARE EDUCATION/TRAINING PROGRAM

## 2020-08-05 PROCEDURE — 25000003 PHARM REV CODE 250: Performed by: UROLOGY

## 2020-08-05 PROCEDURE — 95971 ALYS SMPL SP/PN NPGT W/PRGRM: CPT | Mod: 59,,, | Performed by: UROLOGY

## 2020-08-05 PROCEDURE — 36000709 HC OR TIME LEV III EA ADD 15 MIN: Performed by: UROLOGY

## 2020-08-05 PROCEDURE — D9220A PRA ANESTHESIA: ICD-10-PCS | Mod: ,,, | Performed by: ANESTHESIOLOGY

## 2020-08-05 PROCEDURE — 71000015 HC POSTOP RECOV 1ST HR: Performed by: UROLOGY

## 2020-08-05 PROCEDURE — 64590 PR IMPLANT PERIPH/GASTRIC NEUROSTIM/RECEIVER: ICD-10-PCS | Mod: 58,,, | Performed by: UROLOGY

## 2020-08-05 PROCEDURE — C1787 PATIENT PROGR, NEUROSTIM: HCPCS | Performed by: UROLOGY

## 2020-08-05 PROCEDURE — 37000009 HC ANESTHESIA EA ADD 15 MINS: Performed by: UROLOGY

## 2020-08-05 DEVICE — SYS INTERSTIM X RECHARGE FREE: Type: IMPLANTABLE DEVICE | Site: BACK | Status: FUNCTIONAL

## 2020-08-05 RX ORDER — HALOPERIDOL 5 MG/ML
0.5 INJECTION INTRAMUSCULAR ONCE AS NEEDED
Status: DISCONTINUED | OUTPATIENT
Start: 2020-08-05 | End: 2020-08-05 | Stop reason: HOSPADM

## 2020-08-05 RX ORDER — FENTANYL CITRATE 50 UG/ML
INJECTION, SOLUTION INTRAMUSCULAR; INTRAVENOUS
Status: DISCONTINUED | OUTPATIENT
Start: 2020-08-05 | End: 2020-08-05

## 2020-08-05 RX ORDER — MIDAZOLAM HYDROCHLORIDE 1 MG/ML
INJECTION, SOLUTION INTRAMUSCULAR; INTRAVENOUS
Status: DISCONTINUED | OUTPATIENT
Start: 2020-08-05 | End: 2020-08-05

## 2020-08-05 RX ORDER — KETAMINE HCL IN 0.9 % NACL 50 MG/5 ML
SYRINGE (ML) INTRAVENOUS
Status: DISCONTINUED | OUTPATIENT
Start: 2020-08-05 | End: 2020-08-05

## 2020-08-05 RX ORDER — SODIUM CHLORIDE 9 MG/ML
INJECTION, SOLUTION INTRAVENOUS CONTINUOUS PRN
Status: DISCONTINUED | OUTPATIENT
Start: 2020-08-05 | End: 2020-08-05

## 2020-08-05 RX ORDER — ONDANSETRON 2 MG/ML
INJECTION INTRAMUSCULAR; INTRAVENOUS
Status: DISCONTINUED | OUTPATIENT
Start: 2020-08-05 | End: 2020-08-05

## 2020-08-05 RX ORDER — ONDANSETRON 2 MG/ML
4 INJECTION INTRAMUSCULAR; INTRAVENOUS DAILY PRN
Status: DISCONTINUED | OUTPATIENT
Start: 2020-08-05 | End: 2020-08-05 | Stop reason: HOSPADM

## 2020-08-05 RX ORDER — FENTANYL CITRATE 50 UG/ML
25 INJECTION, SOLUTION INTRAMUSCULAR; INTRAVENOUS EVERY 5 MIN PRN
Status: DISCONTINUED | OUTPATIENT
Start: 2020-08-05 | End: 2020-08-05 | Stop reason: HOSPADM

## 2020-08-05 RX ORDER — PROPOFOL 10 MG/ML
VIAL (ML) INTRAVENOUS
Status: DISCONTINUED | OUTPATIENT
Start: 2020-08-05 | End: 2020-08-05

## 2020-08-05 RX ORDER — DEXAMETHASONE SODIUM PHOSPHATE 4 MG/ML
INJECTION, SOLUTION INTRA-ARTICULAR; INTRALESIONAL; INTRAMUSCULAR; INTRAVENOUS; SOFT TISSUE
Status: DISCONTINUED | OUTPATIENT
Start: 2020-08-05 | End: 2020-08-05

## 2020-08-05 RX ORDER — LIDOCAINE HYDROCHLORIDE AND EPINEPHRINE 10; 10 MG/ML; UG/ML
INJECTION, SOLUTION INFILTRATION; PERINEURAL
Status: DISCONTINUED | OUTPATIENT
Start: 2020-08-05 | End: 2020-08-05 | Stop reason: HOSPADM

## 2020-08-05 RX ORDER — CEFAZOLIN SODIUM 1 G/3ML
2 INJECTION, POWDER, FOR SOLUTION INTRAMUSCULAR; INTRAVENOUS
Status: COMPLETED | OUTPATIENT
Start: 2020-08-05 | End: 2020-08-05

## 2020-08-05 RX ORDER — ACETAMINOPHEN 500 MG
1000 TABLET ORAL ONCE
Status: COMPLETED | OUTPATIENT
Start: 2020-08-05 | End: 2020-08-05

## 2020-08-05 RX ORDER — TRAMADOL HYDROCHLORIDE 50 MG/1
50 TABLET ORAL EVERY 6 HOURS PRN
Qty: 5 TABLET | Refills: 0 | Status: ON HOLD | OUTPATIENT
Start: 2020-08-05 | End: 2020-11-26

## 2020-08-05 RX ORDER — SODIUM BICARBONATE 1 MEQ/ML
SYRINGE (ML) INTRAVENOUS
Status: DISCONTINUED | OUTPATIENT
Start: 2020-08-05 | End: 2020-08-05 | Stop reason: HOSPADM

## 2020-08-05 RX ORDER — SODIUM CHLORIDE 9 MG/ML
INJECTION, SOLUTION INTRAVENOUS CONTINUOUS
Status: DISCONTINUED | OUTPATIENT
Start: 2020-08-05 | End: 2020-08-05 | Stop reason: HOSPADM

## 2020-08-05 RX ORDER — AMOXICILLIN AND CLAVULANATE POTASSIUM 875; 125 MG/1; MG/1
1 TABLET, FILM COATED ORAL 2 TIMES DAILY
Qty: 10 TABLET | Refills: 0 | Status: SHIPPED | OUTPATIENT
Start: 2020-08-05 | End: 2020-08-10

## 2020-08-05 RX ORDER — PROPOFOL 10 MG/ML
VIAL (ML) INTRAVENOUS CONTINUOUS PRN
Status: DISCONTINUED | OUTPATIENT
Start: 2020-08-05 | End: 2020-08-05

## 2020-08-05 RX ADMIN — MIDAZOLAM HYDROCHLORIDE 1 MG: 1 INJECTION, SOLUTION INTRAMUSCULAR; INTRAVENOUS at 10:08

## 2020-08-05 RX ADMIN — FENTANYL CITRATE 25 MCG: 50 INJECTION, SOLUTION INTRAMUSCULAR; INTRAVENOUS at 10:08

## 2020-08-05 RX ADMIN — MIDAZOLAM HYDROCHLORIDE 2 MG: 1 INJECTION, SOLUTION INTRAMUSCULAR; INTRAVENOUS at 10:08

## 2020-08-05 RX ADMIN — SODIUM CHLORIDE: 0.9 INJECTION, SOLUTION INTRAVENOUS at 10:08

## 2020-08-05 RX ADMIN — Medication 20 MG: at 10:08

## 2020-08-05 RX ADMIN — DEXAMETHASONE SODIUM PHOSPHATE 4 MG: 4 INJECTION, SOLUTION INTRAMUSCULAR; INTRAVENOUS at 10:08

## 2020-08-05 RX ADMIN — Medication 10 MG: at 10:08

## 2020-08-05 RX ADMIN — ONDANSETRON 4 MG: 2 INJECTION, SOLUTION INTRAMUSCULAR; INTRAVENOUS at 10:08

## 2020-08-05 RX ADMIN — ACETAMINOPHEN 1000 MG: 500 TABLET ORAL at 09:08

## 2020-08-05 RX ADMIN — CEFAZOLIN 2 G: 330 INJECTION, POWDER, FOR SOLUTION INTRAMUSCULAR; INTRAVENOUS at 10:08

## 2020-08-05 RX ADMIN — PROPOFOL 50 MCG/KG/MIN: 10 INJECTION, EMULSION INTRAVENOUS at 10:08

## 2020-08-05 RX ADMIN — FENTANYL CITRATE 50 MCG: 50 INJECTION, SOLUTION INTRAMUSCULAR; INTRAVENOUS at 10:08

## 2020-08-05 RX ADMIN — PROPOFOL 20 MG: 10 INJECTION, EMULSION INTRAVENOUS at 10:08

## 2020-08-05 NOTE — PROGRESS NOTES
[employer] PSR Registry Study  IRB# 2014.240  PI: Dr. Yong Anguiano     Subject #: [id number]  Date: Aug 05, 2020  Visit: Enrollment      agreed and consented to participate in the [employer] PSR Registry Study. After signing consent, the following procedures were recorded:     1. Patient demographics   2. Eligibility criteria was confirmed with Dr. Anguiano.  3. Insurance payer information  4. A physical exam was previously performed by Dr. Anguiano  5. Therapy relevant medical history  6. General medical history  7. SNM Device history  8. Test stimulation was previously conducted. Surgery today for Stage 2, Inter Stim implant.       All questions were answered. A copy of the consent form was given to the patient.

## 2020-08-05 NOTE — INTERVAL H&P NOTE
The patient has been examined and the H&P has been reviewed:    I concur with the findings and no changes have occurred since H&P was written.    Anesthesia/Surgery risks, benefits and alternative options discussed and understood by patient/family.          Active Hospital Problems    Diagnosis  POA    Recurrent UTI [N39.0]  Yes      Resolved Hospital Problems   No resolved problems to display.

## 2020-08-05 NOTE — PLAN OF CARE
Procedure and anesthesia tolerated. No complaints of pain. Discharge material given and explained to patient and spouse. Both verbalized understanding. Patient taken to ride via wheelchair in stable condition with no complaints.

## 2020-08-05 NOTE — DISCHARGE SUMMARY
[facility]  Discharge Note  Short Stay    Admit Date: 8/5/2020    Discharge Date and Time: 08/05/2020 11:09 AM      Attending Physician: Yong Anguiano MD     Discharge Provider: Marci Julien    Diagnoses:  Active Hospital Problems    Diagnosis  POA    *Recurrent UTI [N39.0]  Yes      Resolved Hospital Problems   No resolved problems to display.       Discharged Condition: good    Hospital Course: Patient was admitted for stage 2 interstim and tolerated the procedure well with no complications. The patient was discharged home in good condition on the same day.       Final Diagnoses: Same as principal problem.    Disposition: Home or Self Care    Follow up/Patient Instructions:    Medications:  Reconciled Home Medications:   Current Discharge Medication List      START taking these medications    Details   amoxicillin-clavulanate 875-125mg (AUGMENTIN) 875-125 mg per tablet Take 1 tablet by mouth 2 (two) times a day. for 5 days  Qty: 10 tablet, Refills: 0      traMADoL (ULTRAM) 50 mg tablet Take 1 tablet (50 mg total) by mouth every 6 (six) hours as needed for Pain.  Qty: 5 tablet, Refills: 0    Comments: n/a          CONTINUE these medications which have NOT CHANGED    Details   ALPRAZolam (XANAX) 2 MG Tab Take 1 tablet (2 mg total) by mouth nightly as needed.  Qty: 30 tablet, Refills: 1    Associated Diagnoses: IC (interstitial cystitis); Chronic bladder pain      amitriptyline (ELAVIL) 25 MG tablet Take 1 tablet (25 mg total) by mouth every evening.  Qty: 30 tablet, Refills: 11      butalbital-acetaminophen-caffeine -40 mg (FIORICET, ESGIC) -40 mg per tablet Take 1-2 tablets by mouth every 6 (six) hours as needed for Headaches.  Qty: 50 tablet, Refills: 1    Associated Diagnoses: Intractable migraine without aura and without status migrainosus      erenumab-aooe 140 mg/mL AtIn Inject 140 mg into the skin every 28 days.  Qty: 1 mL, Refills: 11    Associated Diagnoses: Intractable  periodic headache syndrome      famotidine (PEPCID) 40 MG tablet Take 1 tablet (40 mg total) by mouth nightly as needed (bladder pain).  Qty: 30 tablet, Refills: 11    Associated Diagnoses: IC (interstitial cystitis); Gastroesophageal reflux disease, esophagitis presence not specified      !! FLOMAX 0.4 mg Cap TAKE 1 CAPSULE BY MOUTH EVERY DAY  Qty: 90 capsule, Refills: 3    Associated Diagnoses: Incomplete bladder emptying      hydrOXYzine HCL (ATARAX) 25 MG tablet Take 1 tablet (25 mg total) by mouth every evening.  Qty: 30 tablet, Refills: 11    Associated Diagnoses: Interstitial cystitis      oxybutynin (DITROPAN-XL) 10 MG 24 hr tablet Take 1 tablet (10 mg total) by mouth every evening.  Qty: 30 tablet, Refills: 11    Associated Diagnoses: OAB (overactive bladder)      oxyCODONE-acetaminophen (PERCOCET)  mg per tablet Take 1 tablet by mouth every 12 (twelve) hours as needed.  Refills: 0      phenazopyridine (PYRIDIUM) 200 MG tablet TAKE 1 TABLET BY MOUTH THREE TIMES DAILY AS NEEDED FOR PAIN  Qty: 30 tablet, Refills: 9    Comments: 03/20/2020 1:21:27 PM      !! tamsulosin (FLOMAX) 0.4 mg Cap Take 1 capsule (0.4 mg total) by mouth 2 (two) times a day.  Qty: 60 capsule, Refills: 11    Associated Diagnoses: Straining to void      meclizine (ANTIVERT) 25 mg tablet TAKE 1 TABLET BY MOUTH TWO TIMES DAILY FOR DIZZINESS  Refills: 0      metoclopramide HCl (REGLAN) 10 MG tablet Take 1 tablet (10 mg total) by mouth 2 (two) times daily as needed (Headache and nausea).  Qty: 15 tablet, Refills: 11    Associated Diagnoses: Intractable periodic headache syndrome      promethazine (PHENERGAN) 50 MG tablet Take 50 mg by mouth every 12 (twelve) hours as needed.  Refills: 0       !! - Potential duplicate medications found. Please discuss with provider.        Discharge Procedure Orders   Diet Adult Regular     Notify your health care provider if you experience any of the following:  temperature >100.4     Notify your health  care provider if you experience any of the following:  persistent nausea and vomiting or diarrhea     Notify your health care provider if you experience any of the following:  severe uncontrolled pain     Notify your health care provider if you experience any of the following:  redness, tenderness, or signs of infection (pain, swelling, redness, odor or green/yellow discharge around incision site)     No dressing needed   Order Comments: Dressing can come off in 2 days. OK to shower, do not soak in bathtub for 2 weeks.     Activity as tolerated     Follow-up Information     Yong Anguiano MD In 1 month.    Specialty: Urology  Why: post op  [facility] information:  [address] [facility] [address]  [phone]                   Discharge Procedure Orders (must include Diet, Follow-up, Activity):   Discharge Procedure Orders (must include Diet, Follow-up, Activity)   Diet Adult Regular     Notify your health care provider if you experience any of the following:  temperature >100.4     Notify your health care provider if you experience any of the following:  persistent nausea and vomiting or diarrhea     Notify your health care provider if you experience any of the following:  severe uncontrolled pain     Notify your health care provider if you experience any of the following:  redness, tenderness, or signs of infection (pain, swelling, redness, odor or green/yellow discharge around incision site)     No dressing needed   Order Comments: Dressing can come off in 2 days. OK to shower, do not soak in bathtub for 2 weeks.     Activity as tolerated

## 2020-08-05 NOTE — PROGRESS NOTES
[employer] SNM Registry Study  IRB# 2014.240  PI: Dr. Yong Anguiano     Subject # [id number] ([id number])  Date: 08/05/2020  Visit: Procedure     Patient was taken to the OR by Dr. Anguiano for SNM Neurostimulator placement. The following procedures/details were recorded:     1. Surgery was performed at [facility] by Dr. Yong Anguiano.  2. The device model number is 3058, the serial number is [id number] and the location is right buttock . Methods used to prevent surgical site infection are pre, intra and post op antibiotics.   3. Lead information and details.Lead placed on 22-Jul-2020 , model 3889-28, lot number [id number].   4. Device programming data was obtained and has been uploaded through the CDU application.

## 2020-08-05 NOTE — ANESTHESIA POSTPROCEDURE EVALUATION
Anesthesia Post Evaluation    Patient: Elana Gonzales    Procedure(s) Performed: Procedure(s) (LRB):  INSERTION, NEUROSTIMULATOR, PERMANENT, SACRAL STAGE 2 (N/A)  PROGRAMMING-STIMULATOR (Right)    Final Anesthesia Type: general    Patient location during evaluation: PACU  Patient participation: Yes- Able to Participate  Level of consciousness: awake and alert  Post-procedure vital signs: reviewed and stable  Pain management: adequate  Airway patency: patent    PONV status at discharge: No PONV  Anesthetic complications: no      Cardiovascular status: blood pressure returned to baseline  Respiratory status: spontaneous ventilation and room air  Hydration status: euvolemic  Follow-up not needed.          Vitals Value Taken Time   /59 08/05/20 1216   Temp 36.7 °C (98 °F) 08/05/20 1130   Pulse 85 08/05/20 1228   Resp 16 08/05/20 1200   SpO2 99 % 08/05/20 1228   Vitals shown include unvalidated device data.      Event Time   Out of Recovery 11:41:00         Pain/Reyes Score: Pain Rating Prior to Med Admin: 2 (8/5/2020  9:21 AM)  Reyes Score: 10 (8/5/2020 12:29 PM)

## 2020-08-05 NOTE — PROGRESS NOTES
[employer] PSR Regisrty Study  IRB# 2014.240  PI: Dr. Yong Anguiano     Subject # : [id number]  Date:8/5/2020  Visit: CONSENT / ENROLL     The informed consent process for this study was conducted prior to Stage 2 Inter Stim surgery. Patient had not been given any sedatives prior to obtaining consent. Patient was not accompanied by any friend/ relative. A brief description of the study had already been provided to the patient by research coordinator Bianca Trevino RN over the phone.     A full review of the consent document was performed. Opportunity for questions was available throughout the discussion.  All questions were answered to the patient's satisfaction. After the review of the consent document the patient verbalized an understanding of the study and a willingness to participate. The consent document was then signed. The purpose of the study, length of the study, study visit and procedures, risks, benefits, responsibilities, alternative treatments, costs, compensation for injury, withdrawal notices, HIPAA authorization were fully discussed. A copy of the signed document was provided to the patient along with the contact information for the study staff. A copy has been scanned in to Epic, see Media tabThe patient was encouraged to call our offices if he should have any questions regarding the study or his participation.No study related procedures were performed prior to consent.

## 2020-08-05 NOTE — TRANSFER OF CARE
"Anesthesia Transfer of Care Note    Patient: Elana Gonzales    Procedure(s) Performed: Procedure(s) (LRB):  INSERTION, NEUROSTIMULATOR, PERMANENT, SACRAL STAGE 2 (N/A)  PROGRAMMING-STIMULATOR (Right)    Patient location: PACU    Anesthesia Type: MAC    Transport from OR: Transported from OR on 6-10 L/min O2 by face mask with adequate spontaneous ventilation    Post pain: adequate analgesia    Post assessment: no apparent anesthetic complications and tolerated procedure well    Post vital signs: stable    Level of consciousness: awake and alert    Nausea/Vomiting: no nausea/vomiting    Complications: none    Transfer of care protocol was followed      Last vitals:   Visit Vitals  /72   Pulse 97   Temp 36.6 °C (97.9 °F) (Temporal)   Resp 17   Ht 5' 3" (1.6 m)   Wt 88.5 kg (195 lb)   LMP 08/26/2008   SpO2 99%   Breastfeeding No   BMI 34.54 kg/m²     "

## 2020-08-05 NOTE — OP NOTE
[facility]  Operative Note    Date: 08/05/2020    Pre-Op Diagnosis: incomplete bladder emptying, recurrent UTI, interstitial cystitis    Patient Active Problem List    Diagnosis Date Noted    Urinary retention with incomplete bladder emptying 07/22/2020    Recurrent UTI 07/07/2020    Chronic constipation 07/07/2020    Strains to urinate 05/29/2020    Interstitial cystitis 03/13/2019    GERD (gastroesophageal reflux disease) 03/04/2019    Bladder pain 04/13/2018    IC (interstitial cystitis) 08/16/2017    Acute UTI 07/28/2017    Nocturia 07/25/2017    Dizzy 10/04/2016    History of diverticulitis 01/27/2016    Abdominal pain, RLQ (right lower quadrant) 01/14/2016    Intractable migraine without aura and without status migrainosus 10/07/2015    Migraine without aura, with intractable migraine, so stated, without mention of status migrainosus 06/26/2015    Anxiety state 06/26/2015    Memory loss 06/26/2015    Incomplete bladder emptying 06/17/2014    Right flank pain 06/17/2014       Post-Op Diagnosis: same    Procedure(s) Performed:   1.  Insertion of peripheral neurostimulator pulse generator (56348)  2.  Electronic analysis of implanted neurostimulator pulse generator system with intraoperative or subsequent programming (88789)    Specimen(s): none    Staff Surgeon: Yong Anguiano MD    Assistant Surgeon: Marci Julien MD    Anesthesia: General MAC anesthesia    Indications: Elana Gonzales is a 45 y.o. female with incomplete bladder emptying, interstitial cystitis, and recurrent UTIs. She has tried and failed multiple medical therapies. She underwent Stage 1 Interstim on 7/22 with an excellent response.     Findings:  Lead placed on right side  Generator placed on right side  Good sensory and motor function c/w S3 stimulation seen    Estimated Blood Loss: minimal    Drains: none    Complications:  None    Procedure in Detail:  After informed consent was obtained, the patient  was transferred to the operating suite and placed in the prone position.  Anesthesia was administered.  The patient received preoperative antibiotics.  The patient was then prepped and draped in the usual sterile fashion.  Timeout was performed and preoperative antibiotics were confirmed.      1% lidocaine was injected over the right hip along the previous incision.  This was then opened sharply using a 15 blade.  Bovie electrocautery and blunt dissection were used to open the hip pocket.  The lead which was connected to the percutaneous extension was identified and elevated.  The extension was cut and removed from the field, ensuring sterility.  The subcutaneous pocket anterior to the muscle surface was enlarged and hemostasis was established.      The sutures holding the protective boot were cut and the protective boot was retracted.  The set screws were exposed and loosened with a hex wrench.  The boot was removed and discarded.  The lead was cleansed of body fluid and dried and then inserted into the header of the neurostimulator until the blue tip was visualized at the distal window.  The single set screw was tightened.  The neurostimulator was placed into the subcutaneous pocket with the etched identification side placed upwards. The programming head was placed over the implanted neurostimulator in a sterile cover to ensure adequate lead connection and that parameters were within normal limits.  Impedances were confirmed to be within normal limits (>50 and <4,000).      The wound was irrigated with bacitracin solution and then closed in 2 layers, using 3-0 vicryl deep dermal suttres and a running 4-0 monocryl subcuticular superficially.  Steristips, telfa and a tegaderm were applied.      The patient tolerated the procedure well and was transferred to the recovery room in stable condition.      Disposition:  The patient will follow up with Dr. Anguiano in 1 month.  Prescriptions were given for tramadol and  augmentin for 5 days.  The patient will meet with the [employer] rep in the recovery room.      Marci Julien MD

## 2020-08-07 ENCOUNTER — TELEPHONE (OUTPATIENT)
Dept: PHARMACY | Facility: CLINIC | Age: 45
End: 2020-08-07

## 2020-08-10 DIAGNOSIS — R33.9 INCOMPLETE BLADDER EMPTYING: ICD-10-CM

## 2020-08-10 RX ORDER — TAMSULOSIN HYDROCHLORIDE 0.4 MG/1
CAPSULE ORAL
Qty: 90 CAPSULE | Refills: 3 | Status: SHIPPED | OUTPATIENT
Start: 2020-08-10 | End: 2020-08-14

## 2020-08-10 NOTE — TELEPHONE ENCOUNTER
----- Message from Maria Del Carmen Wen sent at 8/10/2020  9:44 AM CDT -----  Regarding: Pt  Reason: Calling to speak with nurse regard to medication Flomax. Please call     Communication: [phone]

## 2020-08-17 DIAGNOSIS — F41.1 ANXIETY STATE: ICD-10-CM

## 2020-08-17 DIAGNOSIS — R39.16 STRAINS TO URINATE: Primary | ICD-10-CM

## 2020-08-17 DIAGNOSIS — R39.89 BLADDER PAIN: ICD-10-CM

## 2020-08-17 DIAGNOSIS — R33.9 INCOMPLETE BLADDER EMPTYING: ICD-10-CM

## 2020-08-17 DIAGNOSIS — R39.82 CHRONIC BLADDER PAIN: ICD-10-CM

## 2020-08-17 DIAGNOSIS — N30.10 IC (INTERSTITIAL CYSTITIS): ICD-10-CM

## 2020-08-17 RX ORDER — TAMSULOSIN HYDROCHLORIDE 0.4 MG/1
0.4 CAPSULE ORAL DAILY
Qty: 30 CAPSULE | Refills: 11 | Status: SHIPPED | OUTPATIENT
Start: 2020-08-17 | End: 2020-08-18 | Stop reason: SDUPTHER

## 2020-08-17 RX ORDER — ALPRAZOLAM 1 MG/1
1 TABLET ORAL NIGHTLY PRN
Qty: 30 TABLET | Refills: 1 | Status: SHIPPED | OUTPATIENT
Start: 2020-08-17 | End: 2020-09-14

## 2020-08-17 NOTE — TELEPHONE ENCOUNTER
----- Message from Ender Black sent at 8/17/2020 11:25 AM CDT -----  Contact: pt  Pt calling to speak with nurse re Rx        Please contact [phone]

## 2020-08-17 NOTE — TELEPHONE ENCOUNTER
----- Message from Maria Del Carmen Wen sent at 8/17/2020  9:27 AM CDT -----  Regarding: Pt  Reason: Pt calling to speak with Sabina regard to medication Flomax. Please call     Communication: [phone]

## 2020-08-17 NOTE — TELEPHONE ENCOUNTER
Pt is requesting refill on xanax. If ok please print as she wants me to fax to [facility]

## 2020-08-17 NOTE — TELEPHONE ENCOUNTER
Strains to urinate    Bladder pain    Anxiety state  -     ALPRAZolam (XANAX) 1 MG tablet; Take 1 tablet (1 mg total) by mouth nightly as needed.  Dispense: 30 tablet; Refill: 1    IC (interstitial cystitis)  -     ALPRAZolam (XANAX) 1 MG tablet; Take 1 tablet (1 mg total) by mouth nightly as needed.  Dispense: 30 tablet; Refill: 1    Chronic bladder pain  -     ALPRAZolam (XANAX) 1 MG tablet; Take 1 tablet (1 mg total) by mouth nightly as needed.  Dispense: 30 tablet; Refill: 1

## 2020-08-18 RX ORDER — TAMSULOSIN HYDROCHLORIDE 0.4 MG/1
0.4 CAPSULE ORAL DAILY
Qty: 30 CAPSULE | Refills: 11 | Status: SHIPPED | OUTPATIENT
Start: 2020-08-18 | End: 2020-08-18

## 2020-08-18 NOTE — TELEPHONE ENCOUNTER
rx went to [facility] instead of [facility]. I cancelled the rx to [facility]. And called in flomax 0.4mg one by month daily -brand name only to [facility]

## 2020-08-18 NOTE — TELEPHONE ENCOUNTER
Pt is having trouble getting name brand flomax. She spoke to [facility] and they can help get the authorization. [facility]

## 2020-08-18 NOTE — TELEPHONE ENCOUNTER
----- Message from Ender Black sent at 8/18/2020 12:42 PM CDT -----  Contact: pt  Pt would like to receive a call from nurse, would like to explain what she was told by [facility]           Please contact pt [phone]

## 2020-08-20 ENCOUNTER — TELEPHONE (OUTPATIENT)
Dept: UROLOGY | Facility: CLINIC | Age: 45
End: 2020-08-20

## 2020-08-20 NOTE — TELEPHONE ENCOUNTER
----- Message from Maria Del Carmen Wen sent at 8/20/2020  8:47 AM CDT -----  Regarding: Pt  Reason: Pt calling to speak with Sabina regarding appt. Please call     Communication: [phone]

## 2020-08-21 ENCOUNTER — TELEPHONE (OUTPATIENT)
Dept: UROLOGY | Facility: CLINIC | Age: 45
End: 2020-08-21

## 2020-08-21 NOTE — TELEPHONE ENCOUNTER
----- Message from Maria Del Carmen Wen sent at 8/21/2020  2:23 PM CDT -----  Regarding: Pt  Reason; Pt calling to speak with Sabina regard to Pharmacy. Please call       Communication:[phone]

## 2020-08-25 ENCOUNTER — OFFICE VISIT (OUTPATIENT)
Dept: UROLOGY | Facility: CLINIC | Age: 45
End: 2020-08-25
Payer: COMMERCIAL

## 2020-08-25 VITALS
HEIGHT: 63 IN | WEIGHT: 205 LBS | HEART RATE: 88 BPM | DIASTOLIC BLOOD PRESSURE: 81 MMHG | SYSTOLIC BLOOD PRESSURE: 118 MMHG | BODY MASS INDEX: 36.32 KG/M2

## 2020-08-25 DIAGNOSIS — R39.89 BLADDER PAIN: ICD-10-CM

## 2020-08-25 DIAGNOSIS — N30.10 IC (INTERSTITIAL CYSTITIS): ICD-10-CM

## 2020-08-25 DIAGNOSIS — R33.9 URINARY RETENTION WITH INCOMPLETE BLADDER EMPTYING: Primary | ICD-10-CM

## 2020-08-25 DIAGNOSIS — R35.1 NOCTURIA: ICD-10-CM

## 2020-08-25 DIAGNOSIS — N39.0 RECURRENT UTI: ICD-10-CM

## 2020-08-25 PROCEDURE — 99499 NO LOS: ICD-10-PCS | Mod: S$GLB,,, | Performed by: UROLOGY

## 2020-08-25 PROCEDURE — 99999 PR PBB SHADOW E&M-EST. PATIENT-LVL IV: CPT | Mod: PBBFAC,,, | Performed by: UROLOGY

## 2020-08-25 PROCEDURE — 99499 UNLISTED E&M SERVICE: CPT | Mod: S$GLB,,, | Performed by: UROLOGY

## 2020-08-25 PROCEDURE — 99999 PR PBB SHADOW E&M-EST. PATIENT-LVL IV: ICD-10-PCS | Mod: PBBFAC,,, | Performed by: UROLOGY

## 2020-08-25 RX ORDER — PRENATAL WITH FERROUS FUM AND FOLIC ACID 3080; 920; 120; 400; 22; 1.84; 3; 20; 10; 1; 12; 200; 27; 25; 2 [IU]/1; [IU]/1; MG/1; [IU]/1; MG/1; MG/1; MG/1; MG/1; MG/1; MG/1; UG/1; MG/1; MG/1; MG/1; MG/1
1 TABLET ORAL DAILY
Status: ON HOLD | COMMUNITY
Start: 2020-07-17 | End: 2020-11-26

## 2020-08-25 RX ORDER — TRIAMCINOLONE ACETONIDE 1 MG/G
CREAM TOPICAL DAILY
Status: ON HOLD | COMMUNITY
Start: 2020-07-17 | End: 2020-11-26 | Stop reason: ALTCHOICE

## 2020-08-25 NOTE — PROGRESS NOTES
CC:   recurrent UTI, hx of incomplete bladder emptying, IC, chronic constipation, s/p InterStim Therapy  HPI:   Elana Gonzales is a 45 y.o. female with incomplete bladder emptying, interstitial cystitis, and recurrent UTIs. She has tried and failed multiple medical therapies.   C/o incomplete bladder emptying, chronic bladder and chronic constipation.  She saw Dr. Kinsey in GI in the past, and has taken Miralax.  I also recommended Probiotics.    She underwent Stage 1 Interstim on 7/22 with an excellent response.   Procedure(s) Performed: 8/5/20  1.  Insertion of peripheral neurostimulator pulse generator (80066)  2.  Electronic analysis of implanted neurostimulator pulse generator system with intraoperative or subsequent programming (69603)  Findings:  Lead placed on right side  Generator placed on right side  Good sensory and motor function c/w S3 stimulation seen.    She is here today for post op visit.  So far she has done really well.  No recurrent UTI since InterStim Therapy.  Has been on IC meds and Xanax 2 mg q hs for her anxiety, which I have tried to wean down.      Physical Exam   Constitutional: She is oriented to person, place, and time and well-developed, well-nourished, and in no distress.   HENT:   Head: Normocephalic and atraumatic.   Eyes: Pupils are equal, round, and reactive to light. Conjunctivae and EOM are normal.   Neck: Normal range of motion. Neck supple.   Cardiovascular: Normal rate, normal heart sounds and intact distal pulses.   Pulmonary/Chest: Effort normal and breath sounds normal.   Abdominal: Soft. Bowel sounds are normal.   Genitourinary:    Genitourinary Comments: InterStim wound well healed.     Musculoskeletal: Normal range of motion.   Neurological: She is alert and oriented to person, place, and time. Gait normal.   Skin: Skin is warm and dry.   Psychiatric: Mood, memory, affect and judgment normal.        Assessment and plan:     Urinary retention with incomplete bladder  emptying    Nocturia    IC (interstitial cystitis)    Bladder pain    Recurrent UTI    continue IC meds and IC diet.  OK to stop oxybutynin xl which can improve her constipation.  Continue Flomax daily.    Wean Xanax to 1 mg q hs prn.    I spent 25 minutes with the patient of which more than half was spent in direct consultation with the patient in regards to our treatment and plan.    I asked her to look into InterStim website for further information.    Follow up in about 6 months (around 2/25/2021).

## 2020-09-08 ENCOUNTER — TELEPHONE (OUTPATIENT)
Dept: PHARMACY | Facility: CLINIC | Age: 45
End: 2020-09-08

## 2020-09-14 ENCOUNTER — TELEPHONE (OUTPATIENT)
Dept: UROLOGY | Facility: CLINIC | Age: 45
End: 2020-09-14

## 2020-09-14 DIAGNOSIS — N30.10 IC (INTERSTITIAL CYSTITIS): ICD-10-CM

## 2020-09-14 DIAGNOSIS — R39.82 CHRONIC BLADDER PAIN: ICD-10-CM

## 2020-09-14 DIAGNOSIS — F41.1 ANXIETY STATE: ICD-10-CM

## 2020-09-14 RX ORDER — ALPRAZOLAM 2 MG/1
2 TABLET ORAL NIGHTLY PRN
Qty: 30 TABLET | Refills: 1 | Status: ON HOLD | OUTPATIENT
Start: 2020-09-14 | End: 2020-11-26 | Stop reason: SDUPTHER

## 2020-09-14 RX ORDER — ALPRAZOLAM 1 MG/1
2 TABLET ORAL NIGHTLY PRN
Qty: 30 TABLET | Refills: 1 | Status: SHIPPED | OUTPATIENT
Start: 2020-09-14 | End: 2020-09-14

## 2020-09-14 NOTE — TELEPHONE ENCOUNTER
----- Message from Sabina Mills LPN sent at 9/14/2020 10:03 AM CDT -----  Contact: pt  Pt states at last visit you stopped the oxybutynin and lowered her xanax She states she needs to take 2 xanax  -if not it is hard to void and it hurts to void     Note  continue IC meds and IC diet.  OK to stop oxybutynin xl which can improve her constipation.  Continue Flomax daily.     Wean Xanax to 1 mg q hs prn.  ----- Message -----  From: Ender Black  Sent: 9/14/2020   8:45 AM CDT  To: Silvio ROMEO Staff    Pt is calling to speak with staff re medication         Please contact pt [phone]

## 2020-09-14 NOTE — TELEPHONE ENCOUNTER
IC (interstitial cystitis)  -     Discontinue: ALPRAZolam (XANAX) 1 MG tablet; Take 2 tablets (2 mg total) by mouth nightly as needed.  Dispense: 30 tablet; Refill: 1  -     ALPRAZolam (XANAX) 2 MG Tab; Take 1 tablet (2 mg total) by mouth nightly as needed.  Dispense: 30 tablet; Refill: 1    Chronic bladder pain  -     Discontinue: ALPRAZolam (XANAX) 1 MG tablet; Take 2 tablets (2 mg total) by mouth nightly as needed.  Dispense: 30 tablet; Refill: 1  -     ALPRAZolam (XANAX) 2 MG Tab; Take 1 tablet (2 mg total) by mouth nightly as needed.  Dispense: 30 tablet; Refill: 1    Anxiety state  -     Discontinue: ALPRAZolam (XANAX) 1 MG tablet; Take 2 tablets (2 mg total) by mouth nightly as needed.  Dispense: 30 tablet; Refill: 1  -     ALPRAZolam (XANAX) 2 MG Tab; Take 1 tablet (2 mg total) by mouth nightly as needed.  Dispense: 30 tablet; Refill: 1    back to 2 mg Xanax at night since she has a hard time urinating.  Refills given.

## 2020-11-06 ENCOUNTER — SPECIALTY PHARMACY (OUTPATIENT)
Dept: PHARMACY | Facility: CLINIC | Age: 45
End: 2020-11-06

## 2020-11-07 NOTE — TELEPHONE ENCOUNTER
Specialty Pharmacy - Refill Coordination        Refill Questions - Documented Responses      Most Recent Value   Relationship to patient of person spoken to?  Self   HIPAA/medical authority confirmed?  Yes   Any changes in contact preferences or allowed representatives?  No   Has the patient had any insurance changes?  No   Has the patient started taking any new medications, herbals, or supplements?  No   Has the patient been diagnosed with any new medical conditions?  No   Does the patient have any new allergies to medications or foods?  No   Does the patient have any concerns about side effects?  No   Can the patient store medication/sharps container properly (at the correct temperature, away from children/pets, etc.)?  Yes   Can the patient call emergency services (911) in the event of an emergency?  Yes   Does the patient have any concerns or questions about taking or administering this medication as prescribed?  No   How many doses did the patient miss in the past 4 weeks or since the last fill?  0   How many doses does the patient have on hand?  0   How many days does the patient report on hand quantity will last?  0   Does the number of doses/days supply remaining match pharmacy expected amounts?  Yes   How will the patient receive the medication?  Mail   When does the patient need to receive the medication?  11/10/20   Shipping Address  Home   Address in [facility] confirmed and updated if neccessary?  Yes   Expected Copay ($)  0   Payment Method  zero copay   Days supply of Refill  28   Would patient like to speak to a pharmacist?  No   Do you want to trigger an intervention?  No   Do you want to trigger an additional referral task?  No   Refill activity completed?  Yes   Refill activity plan  Refill scheduled   Shipment/Pickup Date:  11/09/20          Current Outpatient Medications   Medication Sig    ALPRAZolam (XANAX) 2 MG Tab Take 1 tablet (2 mg total) by mouth nightly as needed.     amitriptyline (ELAVIL) 25 MG tablet Take 1 tablet (25 mg total) by mouth every evening.    butalbital-acetaminophen-caffeine -40 mg (FIORICET, ESGIC) -40 mg per tablet Take 1-2 tablets by mouth every 6 (six) hours as needed for Headaches.    erenumab-aooe (AIMOVIG) 140 mg/mL autoinjector Inject 140 mg into the skin every 28 days. (Patient taking differently: Inject 140 mg into the skin every 28 days. )    famotidine (PEPCID) 40 MG tablet Take 1 tablet (40 mg total) by mouth nightly as needed (bladder pain).    hydrOXYzine HCL (ATARAX) 25 MG tablet Take 1 tablet (25 mg total) by mouth every evening.    meclizine (ANTIVERT) 25 mg tablet TAKE 1 TABLET BY MOUTH TWO TIMES DAILY FOR DIZZINESS    metoclopramide HCl (REGLAN) 10 MG tablet Take 1 tablet (10 mg total) by mouth 2 (two) times daily as needed (Headache and nausea).    oxyCODONE-acetaminophen (PERCOCET)  mg per tablet Take 1 tablet by mouth every 12 (twelve) hours as needed.    phenazopyridine (PYRIDIUM) 200 MG tablet TAKE 1 TABLET BY MOUTH THREE TIMES DAILY AS NEEDED FOR PAIN    PRENATAL VITAMIN PLUS LOW IRON 27 mg iron- 1 mg Tab Take 1 tablet by mouth once daily.    promethazine (PHENERGAN) 50 MG tablet Take 50 mg by mouth every 12 (twelve) hours as needed.    tamsulosin (FLOMAX) 0.4 mg Cap Take one capsule by mouth once daily.    traMADoL (ULTRAM) 50 mg tablet Take 1 tablet (50 mg total) by mouth every 6 (six) hours as needed for Pain.    triamcinolone acetonide 0.1% (KENALOG) 0.1 % cream once daily. Apply to affected area   Last reviewed on 8/25/2020 10:06 AM by Juliet Gimenez MA    Review of patient's allergies indicates:   Allergen Reactions    Bactrim [sulfamethoxazole-trimethoprim] Other (See Comments)     headache    Codeine Other (See Comments)     Other reaction(s): Vomiting    Last reviewed on  9/14/2020 11:42 AM by Yong Anguiano      Tasks added this encounter   No tasks added.   Tasks due within next 3 months   No  tasks due.     June Kent  [facility]  [address] [address]  Phone: [phone]  Fax: [phone]

## 2020-11-16 ENCOUNTER — TELEPHONE (OUTPATIENT)
Dept: UROLOGY | Facility: CLINIC | Age: 45
End: 2020-11-16

## 2020-11-16 DIAGNOSIS — F41.9 ANXIETY: Primary | ICD-10-CM

## 2020-11-16 RX ORDER — ALPRAZOLAM 2 MG/1
2 TABLET ORAL NIGHTLY PRN
Qty: 30 TABLET | Refills: 1 | Status: SHIPPED | OUTPATIENT
Start: 2020-11-16 | End: 2021-01-11

## 2020-11-16 NOTE — TELEPHONE ENCOUNTER
Anxiety  -     ALPRAZolam (XANAX) 2 MG Tab; Take 1 tablet (2 mg total) by mouth nightly as needed.  Dispense: 30 tablet; Refill: 1

## 2020-11-16 NOTE — TELEPHONE ENCOUNTER
----- Message from Shannan Corral sent at 11/16/2020  8:30 AM CST -----  Regarding: Sabina  Pt left message last thursday in ref to her medication  Contact: [phone]  Pt left a message last Thursday.  Not sure what is going on with her medication.  Please call.  Thanks.

## 2020-11-18 ENCOUNTER — SPECIALTY PHARMACY (OUTPATIENT)
Dept: PHARMACY | Facility: CLINIC | Age: 45
End: 2020-11-18

## 2020-11-18 NOTE — TELEPHONE ENCOUNTER
Elana Gonzales (Key: [id number])    PA submitted 11/18.     No Welcome call needed as this is a reauthorization of [employer].

## 2020-11-19 DIAGNOSIS — G43.C1 INTRACTABLE PERIODIC HEADACHE SYNDROME: ICD-10-CM

## 2020-11-19 NOTE — TELEPHONE ENCOUNTER
PA denied by [employer] stating the patient has alternative insurance. Patient primary insurance on file stating not active. LVM for patient to ask if patient has other primary insurance. If patient does not have primary insurance, she must call medicaid at [phone].

## 2020-11-20 ENCOUNTER — TELEPHONE (OUTPATIENT)
Dept: UROLOGY | Facility: CLINIC | Age: 45
End: 2020-11-20

## 2020-11-20 DIAGNOSIS — R39.82 CHRONIC BLADDER PAIN: Primary | ICD-10-CM

## 2020-11-20 DIAGNOSIS — N30.00 ACUTE CYSTITIS WITHOUT HEMATURIA: ICD-10-CM

## 2020-11-20 RX ORDER — AMOXICILLIN AND CLAVULANATE POTASSIUM 875; 125 MG/1; MG/1
1 TABLET, FILM COATED ORAL 2 TIMES DAILY
Qty: 14 TABLET | Refills: 0 | Status: ON HOLD | OUTPATIENT
Start: 2020-11-20 | End: 2020-11-27 | Stop reason: HOSPADM

## 2020-11-20 NOTE — TELEPHONE ENCOUNTER
Pt c/o dysuria again. She is requesting  A home collect urine for culture for dysuria. She states she will bring the urine to the lab in the morning. She would like an rx sent to [facility]   I placed the order for the culture

## 2020-11-20 NOTE — TELEPHONE ENCOUNTER
----- Message from Gsielle Ruiz sent at 11/20/2020  1:23 PM CST -----  Pt wants to speak with nurse but she did not want to give details     Pt#[phone]

## 2020-11-20 NOTE — TELEPHONE ENCOUNTER
Chronic bladder pain  -     Urine culture; Future; Expected date: 11/20/2020    Acute cystitis without hematuria  -     amoxicillin-clavulanate 875-125mg (AUGMENTIN) 875-125 mg per tablet; Take 1 tablet by mouth 2 (two) times daily. for 7 days  Dispense: 14 tablet; Refill: 0

## 2020-11-23 NOTE — TELEPHONE ENCOUNTER
Call attempt 2-PA denied by [employer] stating the patient has alternative insurance. Patient primary insurance on file stating not active. LVM for patient to ask if patient has other primary insurance. If patient does not have primary insurance, she must call medicaid at [phone].

## 2020-11-23 NOTE — TELEPHONE ENCOUNTER
Incoming call from Patient. She states she does NOT have the primary insurance any longer. Informed patient she must call [facility] to inform them of that so [facility] may process her prescription. Patient voiced understanding.

## 2020-11-24 NOTE — TELEPHONE ENCOUNTER
Patient informed Saray mitchell, that she has talked to medicaid and medicaid is now her primary. Submitted PA on 11/24 via CMM.     Elana Gonzales (Peraza: [id number])

## 2020-11-25 ENCOUNTER — HOSPITAL ENCOUNTER (OUTPATIENT)
Facility: HOSPITAL | Age: 45
Discharge: HOME | End: 2020-11-27
Attending: EMERGENCY MEDICINE | Admitting: INTERNAL MEDICINE
Payer: MEDICAID

## 2020-11-25 DIAGNOSIS — A09 INFECTIOUS COLITIS: ICD-10-CM

## 2020-11-25 DIAGNOSIS — F41.1 ANXIETY STATE: ICD-10-CM

## 2020-11-25 DIAGNOSIS — N30.10 INTERSTITIAL CYSTITIS: ICD-10-CM

## 2020-11-25 DIAGNOSIS — R10.31 ABDOMINAL PAIN, RLQ (RIGHT LOWER QUADRANT): ICD-10-CM

## 2020-11-25 DIAGNOSIS — R10.9 ABDOMINAL PAIN: ICD-10-CM

## 2020-11-25 DIAGNOSIS — K55.9 ISCHEMIC COLITIS: Primary | ICD-10-CM

## 2020-11-25 LAB
ALBUMIN SERPL BCP-MCNC: 4 G/DL (ref 3.5–5.2)
ALP SERPL-CCNC: 170 U/L (ref 55–135)
ALT SERPL W/O P-5'-P-CCNC: 69 U/L (ref 10–44)
ANION GAP SERPL CALC-SCNC: 17 MMOL/L (ref 8–16)
AST SERPL-CCNC: 29 U/L (ref 10–40)
BACTERIA #/AREA URNS HPF: NORMAL /HPF
BASOPHILS # BLD AUTO: 0.06 K/UL (ref 0–0.2)
BASOPHILS NFR BLD: 0.4 % (ref 0–1.9)
BILIRUB SERPL-MCNC: 0.3 MG/DL (ref 0.1–1)
BILIRUB UR QL STRIP: NEGATIVE
BUN SERPL-MCNC: 17 MG/DL (ref 6–20)
CALCIUM SERPL-MCNC: 9.8 MG/DL (ref 8.7–10.5)
CHLORIDE SERPL-SCNC: 109 MMOL/L (ref 95–110)
CLARITY UR: CLEAR
CO2 SERPL-SCNC: 16 MMOL/L (ref 23–29)
COLOR UR: YELLOW
CREAT SERPL-MCNC: 0.8 MG/DL (ref 0.5–1.4)
DIFFERENTIAL METHOD: ABNORMAL
EOSINOPHIL # BLD AUTO: 0 K/UL (ref 0–0.5)
EOSINOPHIL NFR BLD: 0.2 % (ref 0–8)
ERYTHROCYTE [DISTWIDTH] IN BLOOD BY AUTOMATED COUNT: 12.9 % (ref 11.5–14.5)
EST. GFR  (AFRICAN AMERICAN): >60 ML/MIN/1.73 M^2
EST. GFR  (NON AFRICAN AMERICAN): >60 ML/MIN/1.73 M^2
GLUCOSE SERPL-MCNC: 129 MG/DL (ref 70–110)
GLUCOSE UR QL STRIP: NEGATIVE
HCT VFR BLD AUTO: 40 % (ref 37–48.5)
HGB BLD-MCNC: 13.9 G/DL (ref 12–16)
HGB UR QL STRIP: NEGATIVE
HYALINE CASTS #/AREA URNS LPF: 0 /LPF
IMM GRANULOCYTES # BLD AUTO: 0.05 K/UL (ref 0–0.04)
IMM GRANULOCYTES NFR BLD AUTO: 0.3 % (ref 0–0.5)
KETONES UR QL STRIP: NEGATIVE
LEUKOCYTE ESTERASE UR QL STRIP: NEGATIVE
LIPASE SERPL-CCNC: 27 U/L (ref 4–60)
LYMPHOCYTES # BLD AUTO: 2.3 K/UL (ref 1–4.8)
LYMPHOCYTES NFR BLD: 15.9 % (ref 18–48)
MCH RBC QN AUTO: 30.5 PG (ref 27–31)
MCHC RBC AUTO-ENTMCNC: 34.8 G/DL (ref 32–36)
MCV RBC AUTO: 88 FL (ref 82–98)
MICROSCOPIC COMMENT: NORMAL
MONOCYTES # BLD AUTO: 0.7 K/UL (ref 0.3–1)
MONOCYTES NFR BLD: 4.5 % (ref 4–15)
NEUTROPHILS # BLD AUTO: 11.6 K/UL (ref 1.8–7.7)
NEUTROPHILS NFR BLD: 78.7 % (ref 38–73)
NITRITE UR QL STRIP: NEGATIVE
NRBC BLD-RTO: 0 /100 WBC
OB PNL STL: NEGATIVE
PH UR STRIP: 8 [PH] (ref 5–8)
PLATELET # BLD AUTO: 277 K/UL (ref 150–350)
PMV BLD AUTO: 10.7 FL (ref 9.2–12.9)
POTASSIUM SERPL-SCNC: 4 MMOL/L (ref 3.5–5.1)
PROT SERPL-MCNC: 8.2 G/DL (ref 6–8.4)
PROT UR QL STRIP: ABNORMAL
RBC # BLD AUTO: 4.56 M/UL (ref 4–5.4)
RBC #/AREA URNS HPF: 0 /HPF (ref 0–4)
SODIUM SERPL-SCNC: 142 MMOL/L (ref 136–145)
SP GR UR STRIP: 1.01 (ref 1–1.03)
URN SPEC COLLECT METH UR: ABNORMAL
UROBILINOGEN UR STRIP-ACNC: NEGATIVE EU/DL
WBC # BLD AUTO: 14.68 K/UL (ref 3.9–12.7)
WBC #/AREA URNS HPF: 0 /HPF (ref 0–5)

## 2020-11-25 PROCEDURE — 96374 THER/PROPH/DIAG INJ IV PUSH: CPT | Mod: 59

## 2020-11-25 PROCEDURE — 96375 TX/PRO/DX INJ NEW DRUG ADDON: CPT | Mod: 59

## 2020-11-25 PROCEDURE — 63600175 PHARM REV CODE 636 W HCPCS: Performed by: STUDENT IN AN ORGANIZED HEALTH CARE EDUCATION/TRAINING PROGRAM

## 2020-11-25 PROCEDURE — 80053 COMPREHEN METABOLIC PANEL: CPT

## 2020-11-25 PROCEDURE — 63600175 PHARM REV CODE 636 W HCPCS: Performed by: EMERGENCY MEDICINE

## 2020-11-25 PROCEDURE — 85025 COMPLETE CBC W/AUTO DIFF WBC: CPT

## 2020-11-25 PROCEDURE — 82272 OCCULT BLD FECES 1-3 TESTS: CPT

## 2020-11-25 PROCEDURE — 83690 ASSAY OF LIPASE: CPT

## 2020-11-25 PROCEDURE — 81000 URINALYSIS NONAUTO W/SCOPE: CPT

## 2020-11-25 PROCEDURE — 99285 EMERGENCY DEPT VISIT HI MDM: CPT | Mod: 25

## 2020-11-25 RX ORDER — ONDANSETRON 2 MG/ML
4 INJECTION INTRAMUSCULAR; INTRAVENOUS
Status: COMPLETED | OUTPATIENT
Start: 2020-11-25 | End: 2020-11-25

## 2020-11-25 RX ORDER — HYDROMORPHONE HYDROCHLORIDE 1 MG/ML
1 INJECTION, SOLUTION INTRAMUSCULAR; INTRAVENOUS; SUBCUTANEOUS
Status: COMPLETED | OUTPATIENT
Start: 2020-11-25 | End: 2020-11-25

## 2020-11-25 RX ORDER — MORPHINE SULFATE 2 MG/ML
6 INJECTION, SOLUTION INTRAMUSCULAR; INTRAVENOUS
Status: COMPLETED | OUTPATIENT
Start: 2020-11-25 | End: 2020-11-25

## 2020-11-25 RX ORDER — NITROGLYCERIN 0.4 MG/1
0.4 TABLET SUBLINGUAL
Status: DISCONTINUED | OUTPATIENT
Start: 2020-11-25 | End: 2020-11-25

## 2020-11-25 RX ADMIN — ONDANSETRON 4 MG: 2 INJECTION INTRAMUSCULAR; INTRAVENOUS at 11:11

## 2020-11-25 RX ADMIN — MORPHINE SULFATE 6 MG: 2 INJECTION, SOLUTION INTRAMUSCULAR; INTRAVENOUS at 09:11

## 2020-11-25 RX ADMIN — HYDROMORPHONE HYDROCHLORIDE 1 MG: 1 INJECTION, SOLUTION INTRAMUSCULAR; INTRAVENOUS; SUBCUTANEOUS at 11:11

## 2020-11-25 RX ADMIN — IOHEXOL 100 ML: 350 INJECTION, SOLUTION INTRAVENOUS at 11:11

## 2020-11-25 NOTE — TELEPHONE ENCOUNTER
Informed patient appeal is required on [employer]. Sent Aor form to email at [email or web address]. Patient states she will return by email.     Insurance wants patient to try either Em[employer] or [employer].

## 2020-11-26 PROBLEM — K55.9 ISCHEMIC COLITIS: Status: ACTIVE | Noted: 2020-11-26

## 2020-11-26 LAB
ABO + RH BLD: NORMAL
ALBUMIN SERPL BCP-MCNC: 4 G/DL (ref 3.5–5.2)
ALP SERPL-CCNC: 169 U/L (ref 55–135)
ALT SERPL W/O P-5'-P-CCNC: 64 U/L (ref 10–44)
ANION GAP SERPL CALC-SCNC: 14 MMOL/L (ref 8–16)
AST SERPL-CCNC: 24 U/L (ref 10–40)
BASOPHILS # BLD AUTO: 0.05 K/UL (ref 0–0.2)
BASOPHILS NFR BLD: 0.3 % (ref 0–1.9)
BILIRUB SERPL-MCNC: 0.4 MG/DL (ref 0.1–1)
BLD GP AB SCN CELLS X3 SERPL QL: NORMAL
BUN SERPL-MCNC: 16 MG/DL (ref 6–20)
CALCIUM SERPL-MCNC: 9.5 MG/DL (ref 8.7–10.5)
CHLORIDE SERPL-SCNC: 105 MMOL/L (ref 95–110)
CO2 SERPL-SCNC: 21 MMOL/L (ref 23–29)
CREAT SERPL-MCNC: 0.8 MG/DL (ref 0.5–1.4)
DIFFERENTIAL METHOD: ABNORMAL
EOSINOPHIL # BLD AUTO: 0 K/UL (ref 0–0.5)
EOSINOPHIL NFR BLD: 0 % (ref 0–8)
ERYTHROCYTE [DISTWIDTH] IN BLOOD BY AUTOMATED COUNT: 13.6 % (ref 11.5–14.5)
EST. GFR  (AFRICAN AMERICAN): >60 ML/MIN/1.73 M^2
EST. GFR  (NON AFRICAN AMERICAN): >60 ML/MIN/1.73 M^2
GLUCOSE SERPL-MCNC: 138 MG/DL (ref 70–110)
HCT VFR BLD AUTO: 46 % (ref 37–48.5)
HGB BLD-MCNC: 14.9 G/DL (ref 12–16)
IMM GRANULOCYTES # BLD AUTO: 0.05 K/UL (ref 0–0.04)
IMM GRANULOCYTES NFR BLD AUTO: 0.3 % (ref 0–0.5)
LACTATE SERPL-SCNC: 1.7 MMOL/L (ref 0.5–2.2)
LACTATE SERPL-SCNC: 2.5 MMOL/L (ref 0.5–2.2)
LYMPHOCYTES # BLD AUTO: 2.3 K/UL (ref 1–4.8)
LYMPHOCYTES NFR BLD: 14.2 % (ref 18–48)
MCH RBC QN AUTO: 30.1 PG (ref 27–31)
MCHC RBC AUTO-ENTMCNC: 32.4 G/DL (ref 32–36)
MCV RBC AUTO: 93 FL (ref 82–98)
MONOCYTES # BLD AUTO: 0.9 K/UL (ref 0.3–1)
MONOCYTES NFR BLD: 5.5 % (ref 4–15)
NEUTROPHILS # BLD AUTO: 13.1 K/UL (ref 1.8–7.7)
NEUTROPHILS NFR BLD: 79.7 % (ref 38–73)
NRBC BLD-RTO: 0 /100 WBC
PLATELET # BLD AUTO: 258 K/UL (ref 150–350)
PMV BLD AUTO: 10.9 FL (ref 9.2–12.9)
POTASSIUM SERPL-SCNC: 4.2 MMOL/L (ref 3.5–5.1)
PROCALCITONIN SERPL IA-MCNC: 0.05 NG/ML
PROT SERPL-MCNC: 8.3 G/DL (ref 6–8.4)
RBC # BLD AUTO: 4.95 M/UL (ref 4–5.4)
SARS-COV-2 RDRP RESP QL NAA+PROBE: NEGATIVE
SODIUM SERPL-SCNC: 140 MMOL/L (ref 136–145)
WBC # BLD AUTO: 16.44 K/UL (ref 3.9–12.7)

## 2020-11-26 PROCEDURE — 36415 COLL VENOUS BLD VENIPUNCTURE: CPT

## 2020-11-26 PROCEDURE — 80053 COMPREHEN METABOLIC PANEL: CPT

## 2020-11-26 PROCEDURE — 86901 BLOOD TYPING SEROLOGIC RH(D): CPT

## 2020-11-26 PROCEDURE — S0030 INJECTION, METRONIDAZOLE: HCPCS | Performed by: STUDENT IN AN ORGANIZED HEALTH CARE EDUCATION/TRAINING PROGRAM

## 2020-11-26 PROCEDURE — 85025 COMPLETE CBC W/AUTO DIFF WBC: CPT

## 2020-11-26 PROCEDURE — G0378 HOSPITAL OBSERVATION PER HR: HCPCS

## 2020-11-26 PROCEDURE — 96361 HYDRATE IV INFUSION ADD-ON: CPT | Performed by: EMERGENCY MEDICINE

## 2020-11-26 PROCEDURE — 96376 TX/PRO/DX INJ SAME DRUG ADON: CPT | Performed by: EMERGENCY MEDICINE

## 2020-11-26 PROCEDURE — 96375 TX/PRO/DX INJ NEW DRUG ADDON: CPT | Performed by: EMERGENCY MEDICINE

## 2020-11-26 PROCEDURE — 63600175 PHARM REV CODE 636 W HCPCS: Performed by: STUDENT IN AN ORGANIZED HEALTH CARE EDUCATION/TRAINING PROGRAM

## 2020-11-26 PROCEDURE — 25000003 PHARM REV CODE 250: Performed by: STUDENT IN AN ORGANIZED HEALTH CARE EDUCATION/TRAINING PROGRAM

## 2020-11-26 PROCEDURE — 25500020 PHARM REV CODE 255: Performed by: EMERGENCY MEDICINE

## 2020-11-26 PROCEDURE — 84145 PROCALCITONIN (PCT): CPT

## 2020-11-26 PROCEDURE — 83605 ASSAY OF LACTIC ACID: CPT | Mod: 91

## 2020-11-26 PROCEDURE — U0002 COVID-19 LAB TEST NON-CDC: HCPCS

## 2020-11-26 PROCEDURE — 63600175 PHARM REV CODE 636 W HCPCS: Performed by: EMERGENCY MEDICINE

## 2020-11-26 PROCEDURE — 96375 TX/PRO/DX INJ NEW DRUG ADDON: CPT

## 2020-11-26 RX ORDER — SODIUM CHLORIDE 0.9 % (FLUSH) 0.9 %
10 SYRINGE (ML) INJECTION
Status: DISCONTINUED | OUTPATIENT
Start: 2020-11-26 | End: 2020-11-27 | Stop reason: HOSPADM

## 2020-11-26 RX ORDER — PANTOPRAZOLE SODIUM 40 MG/1
40 TABLET, DELAYED RELEASE ORAL 2 TIMES DAILY
Status: DISCONTINUED | OUTPATIENT
Start: 2020-11-26 | End: 2020-11-27 | Stop reason: HOSPADM

## 2020-11-26 RX ORDER — PROMETHAZINE HYDROCHLORIDE 12.5 MG/1
12.5 TABLET ORAL EVERY 6 HOURS PRN
Status: DISCONTINUED | OUTPATIENT
Start: 2020-11-26 | End: 2020-11-27 | Stop reason: HOSPADM

## 2020-11-26 RX ORDER — MORPHINE SULFATE 2 MG/ML
4 INJECTION, SOLUTION INTRAMUSCULAR; INTRAVENOUS EVERY 4 HOURS PRN
Status: DISCONTINUED | OUTPATIENT
Start: 2020-11-26 | End: 2020-11-26

## 2020-11-26 RX ORDER — HALOPERIDOL 5 MG/ML
5 INJECTION INTRAMUSCULAR
Status: COMPLETED | OUTPATIENT
Start: 2020-11-26 | End: 2020-11-26

## 2020-11-26 RX ORDER — METRONIDAZOLE 500 MG/100ML
500 INJECTION, SOLUTION INTRAVENOUS
Status: DISCONTINUED | OUTPATIENT
Start: 2020-11-26 | End: 2020-11-27 | Stop reason: HOSPADM

## 2020-11-26 RX ORDER — SODIUM CHLORIDE, SODIUM LACTATE, POTASSIUM CHLORIDE, CALCIUM CHLORIDE 600; 310; 30; 20 MG/100ML; MG/100ML; MG/100ML; MG/100ML
INJECTION, SOLUTION INTRAVENOUS CONTINUOUS
Status: DISCONTINUED | OUTPATIENT
Start: 2020-11-26 | End: 2020-11-27 | Stop reason: HOSPADM

## 2020-11-26 RX ORDER — CIPROFLOXACIN 2 MG/ML
400 INJECTION, SOLUTION INTRAVENOUS
Status: DISCONTINUED | OUTPATIENT
Start: 2020-11-26 | End: 2020-11-27 | Stop reason: HOSPADM

## 2020-11-26 RX ORDER — HYDROMORPHONE HYDROCHLORIDE 1 MG/ML
1 INJECTION, SOLUTION INTRAMUSCULAR; INTRAVENOUS; SUBCUTANEOUS EVERY 6 HOURS PRN
Status: DISCONTINUED | OUTPATIENT
Start: 2020-11-26 | End: 2020-11-27

## 2020-11-26 RX ORDER — MORPHINE SULFATE 2 MG/ML
4 INJECTION, SOLUTION INTRAMUSCULAR; INTRAVENOUS EVERY 4 HOURS
Status: DISCONTINUED | OUTPATIENT
Start: 2020-11-26 | End: 2020-11-27

## 2020-11-26 RX ADMIN — HYDROMORPHONE HYDROCHLORIDE 1 MG: 1 INJECTION, SOLUTION INTRAMUSCULAR; INTRAVENOUS; SUBCUTANEOUS at 08:11

## 2020-11-26 RX ADMIN — METRONIDAZOLE 500 MG: 500 INJECTION, SOLUTION INTRAVENOUS at 11:11

## 2020-11-26 RX ADMIN — PROMETHAZINE HYDROCHLORIDE 12.5 MG: 12.5 TABLET ORAL at 02:11

## 2020-11-26 RX ADMIN — PANTOPRAZOLE SODIUM 40 MG: 40 TABLET, DELAYED RELEASE ORAL at 10:11

## 2020-11-26 RX ADMIN — CIPROFLOXACIN 400 MG: 2 INJECTION, SOLUTION INTRAVENOUS at 11:11

## 2020-11-26 RX ADMIN — CIPROFLOXACIN 400 MG: 2 INJECTION, SOLUTION INTRAVENOUS at 01:11

## 2020-11-26 RX ADMIN — SODIUM CHLORIDE, SODIUM LACTATE, POTASSIUM CHLORIDE, AND CALCIUM CHLORIDE: .6; .31; .03; .02 INJECTION, SOLUTION INTRAVENOUS at 02:11

## 2020-11-26 RX ADMIN — HALOPERIDOL LACTATE 5 MG: 5 INJECTION, SOLUTION INTRAMUSCULAR at 12:11

## 2020-11-26 RX ADMIN — MORPHINE SULFATE 4 MG: 2 INJECTION, SOLUTION INTRAMUSCULAR; INTRAVENOUS at 06:11

## 2020-11-26 RX ADMIN — PROMETHAZINE HYDROCHLORIDE 12.5 MG: 12.5 TABLET ORAL at 01:11

## 2020-11-26 RX ADMIN — SODIUM CHLORIDE, SODIUM LACTATE, POTASSIUM CHLORIDE, AND CALCIUM CHLORIDE: .6; .31; .03; .02 INJECTION, SOLUTION INTRAVENOUS at 10:11

## 2020-11-26 RX ADMIN — HYDROMORPHONE HYDROCHLORIDE 1 MG: 1 INJECTION, SOLUTION INTRAMUSCULAR; INTRAVENOUS; SUBCUTANEOUS at 02:11

## 2020-11-26 RX ADMIN — MORPHINE SULFATE 4 MG: 2 INJECTION, SOLUTION INTRAMUSCULAR; INTRAVENOUS at 02:11

## 2020-11-26 RX ADMIN — MORPHINE SULFATE 4 MG: 2 INJECTION, SOLUTION INTRAMUSCULAR; INTRAVENOUS at 05:11

## 2020-11-26 RX ADMIN — MORPHINE SULFATE 4 MG: 2 INJECTION, SOLUTION INTRAMUSCULAR; INTRAVENOUS at 10:11

## 2020-11-26 RX ADMIN — PANTOPRAZOLE SODIUM 40 MG: 40 TABLET, DELAYED RELEASE ORAL at 08:11

## 2020-11-26 RX ADMIN — METRONIDAZOLE 500 MG: 500 INJECTION, SOLUTION INTRAVENOUS at 10:11

## 2020-11-26 NOTE — ED NOTES
Pt being transported to the floor via ED tech. Pt in no apparent distress at this time. All vital signs are stable. Pt appears drowsy.

## 2020-11-26 NOTE — H&P
[facility] Medicine H&P Note     Admitting Team: [facility] Hospitalist Team B  Attending Physician: Addi Lu MD  Resident: Kirstin  Intern: Sarbjit     Date of Admit: 2020    Chief Complaint      Abdominal pain and rectal bleeding onset at 4pm on     Subjective:      History of Present Illness:  Elana Gonzales is a 45 y.o. female with PMH anxiety, DVT (2018, s/p Eliquis tx), GERD, and interstitial cystitis. The patient presented to [facility] on 2020 with a primary complaint of abdominal pain and rectal bleeding.    The patient was in their usual state of health until 4pm on . Patient states she suddenly experienced severe nausea and abdominal pain. At this time, she experienced diarrhea which was bloody, bright red and covering the toilet bowl, and vomiting which was nonbloody. States the abd pain is diffuse, 10/10 and non-radiating. Patient states she ate sushi for lunch and since then has not been able to tolerate anything PO.  Pt called EMS and received 100mcgs fentanyl and 4mg zofran en route without relief. In the ED, patient passed bright red blood into the bedside commode several times, appearing to measure about 1 cup. Denies constipation, SOB, chest pain, headache, fever, chills, sick contacts.    Past Medical History:  Past Medical History:   Diagnosis Date    Anxiety     Dizziness     DVT (deep venous thrombosis) 2019    left calf    GERD (gastroesophageal reflux disease)     H. pylori infection     Interstitial cystitis     Migraine headache     PONV (postoperative nausea and vomiting)     x1 after Breast Reduction surgery       Past Surgical History:  Past Surgical History:   Procedure Laterality Date    BREAST SURGERY      2017     SECTION, CLASSIC      COLONOSCOPY N/A 2016    Procedure: COLONOSCOPY;  Surgeon: Phuong Carrillo MD;  Location: [facility];  Service: Endoscopy;  Laterality: N/A;    CYSTOSCOPY WITH  HYDRODISTENSION OF BLADDER N/A 3/13/2019    Procedure: CYSTOSCOPY, WITH BLADDER HYDRODISTENSION;  Surgeon: Yong Anguiano MD;  Location: [facility] OR 2ND FLR;  Service: Urology;  Laterality: N/A;  45 min    CYSTOSCOPY WITH HYDRODISTENSION OF BLADDER N/A 5/27/2020    Procedure: CYSTOSCOPY, WITH BLADDER HYDRODISTENSION;  Surgeon: Yong Anguiano MD;  Location: [facility] OR 1ST FLR;  Service: Urology;  Laterality: N/A;  45 min    CYSTOSCOPY WITH HYDRODISTENSION OF BLADDER N/A 6/10/2020    Procedure: CYSTOSCOPY, WITH BLADDER HYDRODISTENSION;  Surgeon: Yong Anguiano MD;  Location: [facility] OR 1ST FLR;  Service: Urology;  Laterality: N/A;  1hr    ESOPHAGOGASTRODUODENOSCOPY N/A 3/4/2019    Procedure: ESOPHAGOGASTRODUODENOSCOPY (EGD);  Surgeon: Luis Main MD;  Location: [facility] (4TH FLR);  Service: Endoscopy;  Laterality: N/A;    FLUOROSCOPY  7/22/2020    Procedure: FLUOROSCOPY;  Surgeon: Yong Anguiano MD;  Location: [facility] OR 2ND FLR;  Service: Urology;;    HYSTERECTOMY      IMPLANTATION OF PERMANENT SACRAL NERVE STIMULATOR N/A 8/5/2020    Procedure: INSERTION, NEUROSTIMULATOR, PERMANENT, SACRAL STAGE 2;  Surgeon: Yong Anguiano MD;  Location: [facility] OR 2ND FLR;  Service: Urology;  Laterality: N/A;  45 minutes       INJECTION OF BOTULINUM TOXIN TYPE A N/A 8/1/2018    Procedure: INJECTION, BOTULINUM TOXIN, TYPE A 100 UNITS;  Surgeon: Yong Anguiano MD;  [facility];  Service: Urology;  Laterality: N/A;    INJECTION OF BOTULINUM TOXIN TYPE A N/A 3/13/2019    Procedure: INJECTION, BOTULINUM TOXIN, TYPE A 100 UNITS;  Surgeon: Yong Anguiano MD;  Location: [facility] OR 2ND FLR;  Service: Urology;  Laterality: N/A;    INSTILLATION OF URINARY BLADDER N/A 8/1/2018    Procedure: INSTILLATION, URINARY BLADDER;  Surgeon: Yong Anguiano MD;  Location: [facility] OR 1ST FLR;  Service: Urology;  Laterality: N/A;    INSTILLATION OF URINARY BLADDER N/A 3/13/2019    Procedure: INSTILLATION, BLADDER;  Surgeon: Yong Anguiano MD;  Location:  [facility] OR 2ND FLR;  Service: Urology;  Laterality: N/A;    INSTILLATION OF URINARY BLADDER N/A 5/27/2020    Procedure: INSTILLATION, BLADDER;  Surgeon: Yong Anguiano MD;  Location: [facility] OR 1ST FLR;  Service: Urology;  Laterality: N/A;    INSTILLATION OF URINARY BLADDER  6/10/2020    Procedure: INSTILLATION, BLADDER;  Surgeon: Yong Anguiano MD;  Location: [facility] OR 1ST FLR;  Service: Urology;;    OOPHORECTOMY      TONSILLECTOMY      UPPER GASTROINTESTINAL ENDOSCOPY         Allergies:  Review of patient's allergies indicates:   Allergen Reactions    Bactrim [sulfamethoxazole-trimethoprim] Other (See Comments)     headache    Codeine Other (See Comments)     Other reaction(s): Vomiting       Home Medications:  Prior to Admission medications    Medication Sig Start Date End Date Taking? Authorizing Provider   ALPRAZolam (XANAX) 2 MG Tab Take 1 tablet (2 mg total) by mouth nightly as needed. 9/14/20 11/26/20 Yes Yong Anguiano MD   ALPRAZolam (XANAX) 2 MG Tab Take 1 tablet (2 mg total) by mouth nightly as needed. 11/16/20 12/16/20  Yong Anguiano MD   amitriptyline (ELAVIL) 25 MG tablet Take 1 tablet (25 mg total) by mouth every evening. 6/16/20   Yong Anguiano MD   amoxicillin-clavulanate 875-125mg (AUGMENTIN) 875-125 mg per tablet Take 1 tablet by mouth 2 (two) times daily. for 7 days 11/20/20 11/27/20  Yong Anguiano MD   butalbital-acetaminophen-caffeine -40 mg (FIORICET, ESGIC) -40 mg per tablet Take 1-2 tablets by mouth every 6 (six) hours as needed for Headaches. 5/6/16   Gentry Timmons MD   erenumab-aooe (AIMOVIG) 140 mg/mL autoinjector Inject 1 mL (140 mg total) into the skin every 28 days. NO FURTHER REFILLS WITHOUT APPOINTMENT 11/19/20   Jesus Rahman MD   famotidine (PEPCID) 40 MG tablet Take 1 tablet (40 mg total) by mouth nightly as needed (bladder pain). 5/28/20 5/28/21  Yong Anguiano MD   hydrOXYzine HCL (ATARAX) 25 MG tablet Take 1 tablet (25 mg total) by mouth every evening.  6/16/20   Yong Anguiano MD   meclizine (ANTIVERT) 25 mg tablet TAKE 1 TABLET BY MOUTH TWO TIMES DAILY FOR DIZZINESS 11/14/19   Historical Provider   metoclopramide HCl (REGLAN) 10 MG tablet Take 1 tablet (10 mg total) by mouth 2 (two) times daily as needed (Headache and nausea). 11/15/19   Jesus Rahman MD   oxyCODONE-acetaminophen (PERCOCET)  mg per tablet Take 1 tablet by mouth every 12 (twelve) hours as needed. 12/12/18   Historical Provider   phenazopyridine (PYRIDIUM) 200 MG tablet TAKE 1 TABLET BY MOUTH THREE TIMES DAILY AS NEEDED FOR PAIN 3/22/20   Yong Anguiano MD   PRENATAL VITAMIN PLUS LOW IRON 27 mg iron- 1 mg Tab Take 1 tablet by mouth once daily. 7/17/20   Historical Provider   promethazine (PHENERGAN) 50 MG tablet Take 50 mg by mouth every 12 (twelve) hours as needed. 10/17/19   Historical Provider   tamsulosin (FLOMAX) 0.4 mg Cap Take one capsule by mouth once daily. 8/18/20   Yong Anguiano MD   traMADoL (ULTRAM) 50 mg tablet Take 1 tablet (50 mg total) by mouth every 6 (six) hours as needed for Pain. 8/5/20   Marci Julien MD   triamcinolone acetonide 0.1% (KENALOG) 0.1 % cream once daily. Apply to affected area 7/17/20   Historical Provider       Family History:  Family History   Problem Relation Age of Onset    Colon cancer Neg Hx     Esophageal cancer Neg Hx     Inflammatory bowel disease Neg Hx     Celiac disease Neg Hx     Anesthesia problems Neg Hx        Social History:  Social History     Tobacco Use    Smoking status: Never Smoker    Smokeless tobacco: Never Used   Substance Use Topics    Alcohol use: No     Alcohol/week: 0.0 standard drinks    Drug use: No       Review of Systems:  Positive ROS as above. All other systems are reviewed and are negative.    Health Maintaince :   Primary Care Physician: Dr. iTo Reeves     Immunizations:   TDap UTD    Flu UTD       Cancer Screening:  PAP: UTD, wnl  MMG: UTD, wnl       Objective:   Last 24 Hour Vital  "Signs:  BP  Min: 104/73  Max: 137/85  Temp  Av.6 °F (36.4 °C)  Min: 97 °F (36.1 °C)  Max: 98.1 °F (36.7 °C)  Pulse  Av.6  Min: 97  Max: 120  Resp  Av  Min: 14  Max: 20  SpO2  Av.4 %  Min: 95 %  Max: 98 %  Height  Av' 3" (160 cm)  Min: 5' 3" (160 cm)  Max: 5' 3" (160 cm)  Weight  Av.6 kg (195 lb 4.4 oz)  Min: 88.5 kg (195 lb)  Max: 88.7 kg (195 lb 8.8 oz)  Body mass index is 34.64 kg/m².  No intake/output data recorded.    Physical Examination:  Physical Exam   Constitutional: She is oriented to person, place, and time. She appears to be writhing in pain. She appears distressed.   HENT:   Head: Normocephalic and atraumatic.   Eyes: Pupils are equal, round, and reactive to light. Conjunctivae and EOM are normal.   Neck: Normal range of motion. Neck supple.   Cardiovascular: Regular rhythm, normal heart sounds and intact distal pulses. Tachycardia present.   Pulmonary/Chest: Effort normal and breath sounds normal. Tachypnea noted.   Abdominal: Soft. Bowel sounds are normal. She exhibits no distension. There is abdominal tenderness in the epigastric area. There is no rigidity, no tenderness at McBurney's point and negative Cardona's sign.   Pain out of proportion to physical exam   Musculoskeletal: Normal range of motion.         General: No edema.   Neurological: She is alert and oriented to person, place, and time.   Skin: Skin is warm and dry. No rash noted. No erythema. No pallor.           Laboratory:  Most Recent Data:  CBC:   Lab Results   Component Value Date    WBC 14.68 (H) 2020    HGB 13.9 2020    HCT 40.0 2020     2020    MCV 88 2020    RDW 12.9 2020     WBC Differential: 78.7 % N, 0 % Bands, 15.9 % L, 4.5 % M, 0.2 % Eo, 0.4 % Baso  BMP:   Lab Results   Component Value Date     2020    K 4.0 2020     2020    CO2 16 (L) 2020    BUN 17 2020    CREATININE 0.8 2020     (H) 2020    " CALCIUM 9.8 11/25/2020     LFTs:   Lab Results   Component Value Date    PROT 8.2 11/25/2020    ALBUMIN 4.0 11/25/2020    BILITOT 0.3 11/25/2020    AST 29 11/25/2020    ALKPHOS 170 (H) 11/25/2020    ALT 69 (H) 11/25/2020     Coags:   Lab Results   Component Value Date    INR 0.9 09/08/2016     FLP:   Lab Results   Component Value Date    CHOL 211 (H) 08/03/2007    HDL 94 (H) 08/03/2007    LDLCALC 102.6 08/03/2007    TRIG 72 08/03/2007    CHOLHDL 44.5 08/03/2007     DM:   Lab Results   Component Value Date    LDLCALC 102.6 08/03/2007    CREATININE 0.8 11/25/2020     Thyroid:   Lab Results   Component Value Date    TSH 1.5 08/03/2007     Anemia:   Lab Results   Component Value Date    OMWBEAET43 1089 (H) 11/15/2019     Cardiac:   Lab Results   Component Value Date    TROPONINI <0.006 09/08/2016    BNP <10 09/08/2016     Urinalysis:   Lab Results   Component Value Date    LABURIN ESCHERICHIA COLI  >100,000 cfu/ml   (A) 07/22/2020    COLORU Yellow 11/25/2020    SPECGRAV 1.015 11/25/2020    NITRITE Negative 11/25/2020    PROTEINUR n 06/17/2014    KETONESU Negative 11/25/2020    UROBILINOGEN Negative 11/25/2020    BILIRUBINUR n 06/17/2014    WBCUA 0 11/25/2020       Trended Lab Data:  Recent Labs   Lab 11/25/20  2209   WBC 14.68*   HGB 13.9   HCT 40.0      MCV 88   RDW 12.9      K 4.0      CO2 16*   BUN 17   CREATININE 0.8   *   PROT 8.2   ALBUMIN 4.0   BILITOT 0.3   AST 29   ALKPHOS 170*   ALT 69*         Microbiology Data:  Covid neg    Other Results:      Radiology:  Imaging Results           CTA Abdomen and Pelvis (Final result)  Result time 11/26/20 00:11:49    Final result by Nando Singh MD (11/26/20 00:11:49)                 Impression:      Negative CT angiogram of the abdomen or pelvis for acute extravasation or bleed.    No evidence of aneurysm or stenosis.    Hepatic steatosis.    Edematous changes in the wall of the ascending and transverse colon.  Correlate for inflammatory  or infectious colitis.  Normal enhancement is present with no evidence of ischemic cause.    No inflammatory change, mass free fluid or bowel obstruction.    This report was flagged in Epic as abnormal.      Electronically signed by: Nando Singh  Date:    11/26/2020  Time:    00:11             Narrative:    EXAMINATION:  CTA ABDOMEN AND PELVIS    CLINICAL HISTORY:  Mesenteric ischemia, acute;    TECHNIQUE:  Angiographic and routine cyst contrast-enhanced CT images of the abdomen and pelvis were performed following the administration of 100 cc of Omnipaque 350 contrast.  Sagittal and coronal reformatted images were obtained are presented for interpretation.    COMPARISON:  Noncontrast CT, 05/14/2020    FINDINGS:  Angiographic sequences:    The abdominal aorta and major branch vessels are patent with no aneurysm or dissection.  There is no evidence of active extravasation within the abdominal cavity or its contents or in the subcutaneous or muscular regions.  The iliac system as well as the femoral system enhance normally.    Delayed contrast enhanced sequences:    There is mild decreased attenuation throughout the liver without focal mass or biliary ductal dilatation.  The gallbladder, spleen, pancreas, kidneys, pancreas and adrenal glands appear normal.    The loops of  small intestines, appendix and stomach appear unremarkable.  There is mild edema in the ascending and transverse colon but without evidence of obstruction.  The splenic flexure and descending colon appear normal.  Is there is no inflammatory change mass or free fluid.    A pelvic stimulator near the piriformis muscle on the right is noted within the right posterior flank.  No bony lesions are evident.  The lung bases are clear.                                     Assessment:     Elana Gonzales is a 45 y.o. female with anxiety, interstitial cystitis, DVT s/p Eliquis tx 2018, and GERD who presented to the ED with intractable abdominal pain and  nausea, BRBPR, and inability to tolerate PO. FOBT negative. Pain out of proportion to exam. Will admit for workup and pain control.       Plan:     Ischemic colitis  -Patient with pain out of proportion to physical exam and BRBPR  -Pain eventually controlled with 5mg Haldol and 8mg Zofran  -CTA abd/pelvis showing edematous changes in the ascending and transverse colon consistent with inflammatory vs infectious colitis  -GI consulted, appreciate recs to volume resuscitate, start Protonix, will see her in AM, remains NPO   -FOBT negative  -Will monitor CBC daily  -LR infusion at 150mL/hr  -Phenergan and dilaudid PRN     Anxiety  -Takes Xanax 2mg and Elavil 25mg nightly, will hold for now    Interstitial cystitis  -Takes Flomax, will hold while inpatient  -Currently on 7 day Augmentin regimen    HCM  -UTD on vaccines, PAP and mammogram  -Continue to follow up with PCP    Diet: NPO  DVT: SCD  Dispo: pending symptomatic improvement and GI recs    Code Status:     Full    Whitney Hernandez MD  [facility] Anesthesiology [facility]    [facility] Medicine Hospitalist Pager numbers:   [facility] Hospitalist Medicine Team A (Aly/Devonte): [phone]  [facility] Hospitalist Medicine Team B (Lizz/Girish):  [phone]

## 2020-11-26 NOTE — ED PROVIDER NOTES
Encounter Date: 11/25/2020      COVID Statement  The  of Health and Human Services and Jasvir Rosen, Governor of the [facility], have declared a State of Public Health Emergency due to the spread of a novel coronavirus and disease (COVID-19).  There is no currently accepted treatment except conservative measures and respiratory support if appropriate.  This has lead to significant resource capacity and potential delays in care.            History     Chief Complaint   Patient presents with    Abdominal Pain     [facility] with reports of having abdominal pain with rectal bleeding with small amounts of red blood in the toilet.     Rectal Bleeding     This is a 45 year old female with history of DVTs, anxiety, GERD who presents to the ED with significant generalized abdominal pain, nausea, and blood in stool. It started within an hour of eating sushi for dinner. She describes the pain as constant, located all throughout her abdomen, does not radiate. Patient did not take any medications at home for the pain. Via EMS, patient has been tachycardic to 110s, and received 100mcgs fentanyl, 4mg zofran without relief. Patient is in significant pain here int he ED, claims it is 10/10, without radiation, located all throughout the abdomen and not anywhere in particular. Patient also has nausea and has had multiple episodes of emesis at home. Patient had more BRBPR but states she has not had a normal bowel movement since yesterday. Patient denies fevers, chills, diarrhea, constipation, dysuria.         Review of patient's allergies indicates:   Allergen Reactions    Bactrim [sulfamethoxazole-trimethoprim] Other (See Comments)     headache    Codeine Other (See Comments)     Other reaction(s): Vomiting     Past Medical History:   Diagnosis Date    Anxiety     Dizziness     DVT (deep venous thrombosis) 04/2019    left calf    GERD (gastroesophageal reflux disease)      H. pylori infection     Interstitial cystitis     Migraine headache     PONV (postoperative nausea and vomiting)     x1 after Breast Reduction surgery     Past Surgical History:   Procedure Laterality Date    BREAST SURGERY      2017     SECTION, CLASSIC      COLONOSCOPY N/A 2016    Procedure: COLONOSCOPY;  Surgeon: Phuong Carrillo MD;  Location: [facility] ENDO;  Service: Endoscopy;  Laterality: N/A;    CYSTOSCOPY WITH HYDRODISTENSION OF BLADDER N/A 3/13/2019    Procedure: CYSTOSCOPY, WITH BLADDER HYDRODISTENSION;  Surgeon: Yong Anguiano MD;  Location: [facility] OR 2ND FLR;  Service: Urology;  Laterality: N/A;  45 min    CYSTOSCOPY WITH HYDRODISTENSION OF BLADDER N/A 2020    Procedure: CYSTOSCOPY, WITH BLADDER HYDRODISTENSION;  Surgeon: Yong Anguiano MD;  Location: [facility] OR 1ST FLR;  Service: Urology;  Laterality: N/A;  45 min    CYSTOSCOPY WITH HYDRODISTENSION OF BLADDER N/A 6/10/2020    Procedure: CYSTOSCOPY, WITH BLADDER HYDRODISTENSION;  Surgeon: Yong Anguiano MD;  [facility];  Service: Urology;  Laterality: N/A;  1hr    ESOPHAGOGASTRODUODENOSCOPY N/A 3/4/2019    Procedure: ESOPHAGOGASTRODUODENOSCOPY (EGD);  Surgeon: Luis Main MD;  Location: [facility] (4TH FLR);  Service: Endoscopy;  Laterality: N/A;    FLUOROSCOPY  2020    Procedure: FLUOROSCOPY;  Surgeon: Yong Anguiano MD;  Location: [facility] OR 2ND FLR;  Service: Urology;;    HYSTERECTOMY      IMPLANTATION OF PERMANENT SACRAL NERVE STIMULATOR N/A 2020    Procedure: INSERTION, NEUROSTIMULATOR, PERMANENT, SACRAL STAGE 2;  Surgeon: Yong Anguiano MD;  Location: [facility] OR 2ND FLR;  Service: Urology;  Laterality: N/A;  45 minutes       INJECTION OF BOTULINUM TOXIN TYPE A N/A 2018    Procedure: INJECTION, BOTULINUM TOXIN, TYPE A 100 UNITS;  Surgeon: Yong Anguiano MD;  Location: [facility] OR 1ST FLR;  Service: Urology;  Laterality: N/A;    INJECTION OF BOTULINUM TOXIN TYPE A N/A 3/13/2019    Procedure: INJECTION,  BOTULINUM TOXIN, TYPE A 100 UNITS;  Surgeon: Yong Anguiano MD;  [facility];  Service: Urology;  Laterality: N/A;    INSTILLATION OF URINARY BLADDER N/A 8/1/2018    Procedure: INSTILLATION, URINARY BLADDER;  Surgeon: Yong Anguiano MD;  [facility];  Service: Urology;  Laterality: N/A;    INSTILLATION OF URINARY BLADDER N/A 3/13/2019    Procedure: INSTILLATION, BLADDER;  Surgeon: Yong Anguiano MD;  Location: [facility] OR 2ND FLR;  Service: Urology;  Laterality: N/A;    INSTILLATION OF URINARY BLADDER N/A 5/27/2020    Procedure: INSTILLATION, BLADDER;  Surgeon: Yong Anguiano MD;  [facility];  Service: Urology;  Laterality: N/A;    INSTILLATION OF URINARY BLADDER  6/10/2020    Procedure: INSTILLATION, BLADDER;  Surgeon: Yong Anguiano MD;  Location: [facility] OR [address] [place] [address];  Service: Urology;;    OOPHORECTOMY      TONSILLECTOMY      UPPER GASTROINTESTINAL ENDOSCOPY       Family History   Problem Relation Age of Onset    Colon cancer Neg Hx     Esophageal cancer Neg Hx     Inflammatory bowel disease Neg Hx     Celiac disease Neg Hx     Anesthesia problems Neg Hx      Social History     Tobacco Use    Smoking status: Never Smoker    Smokeless tobacco: Never Used   Substance Use Topics    Alcohol use: No     Alcohol/week: 0.0 standard drinks    Drug use: No     Review of Systems   Constitutional: Negative for chills, diaphoresis, fatigue and fever.   HENT: Negative for congestion, postnasal drip, rhinorrhea and sore throat.    Respiratory: Negative for cough, chest tightness, shortness of breath and wheezing.    Cardiovascular: Negative for chest pain, palpitations and leg swelling.   Gastrointestinal: Positive for abdominal pain, blood in stool, nausea and vomiting. Negative for abdominal distention, constipation and diarrhea.   Genitourinary: Negative for dysuria, frequency, urgency and vaginal discharge.   Skin: Negative for color change, pallor, rash and wound.    Neurological: Negative for dizziness, weakness, numbness and headaches.   All other systems reviewed and are negative.      Physical Exam     Initial Vitals [11/25/20 2044]   BP Pulse Resp Temp SpO2   104/73 104 16 97 °F (36.1 °C) 96 %      MAP       --         Physical Exam    Nursing note and vitals reviewed.  Constitutional: She appears well-developed and well-nourished. She is not diaphoretic. No distress.   Patient lying on her left side with legs drawn up in pain.   HENT:   Head: Normocephalic and atraumatic.   Mouth/Throat: No oropharyngeal exudate.   Eyes: Conjunctivae and EOM are normal. Right eye exhibits no discharge. Left eye exhibits no discharge. No scleral icterus.   Neck: Normal range of motion.   Cardiovascular: Normal rate, regular rhythm and intact distal pulses. Exam reveals no gallop and no friction rub.    No murmur heard.  Pulmonary/Chest: No respiratory distress. She has no wheezes. She has no rhonchi. She has no rales. She exhibits no tenderness.   Abdominal: Soft. Bowel sounds are normal. She exhibits no distension and no mass. There is abdominal tenderness (epigastric tenderness, LUQ tenderness.). There is no rebound and no guarding.   Negative psoas, obturator, rovsings sign. Negative lomas sign.    Genitourinary: Rectum:      Guaiac result negative.   Guaiac negative stool. : Acceptable.  Neurological: She is alert and oriented to person, place, and time. GCS score is 15. GCS eye subscore is 4. GCS verbal subscore is 5. GCS motor subscore is 6.   Skin: Skin is warm and dry. No erythema. No pallor.   Psychiatric: She has a normal mood and affect. Her behavior is normal. Judgment and thought content normal.         ED Course   Procedures  Labs Reviewed   CBC W/ AUTO DIFFERENTIAL - Abnormal; Notable for the following components:       Result Value    WBC 14.68 (*)     Gran # (ANC) 11.6 (*)     Immature Grans (Abs) 0.05 (*)     Gran % 78.7 (*)     Lymph % 15.9 (*)     All  other components within normal limits   COMPREHENSIVE METABOLIC PANEL - Abnormal; Notable for the following components:    CO2 16 (*)     Glucose 129 (*)     Alkaline Phosphatase 170 (*)     ALT 69 (*)     Anion Gap 17 (*)     All other components within normal limits   URINALYSIS, REFLEX TO URINE CULTURE - Abnormal; Notable for the following components:    Protein, UA 2+ (*)     All other components within normal limits    Narrative:     Specimen Source->Urine   LIPASE   OCCULT BLOOD X 1, STOOL   URINALYSIS MICROSCOPIC    Narrative:     Specimen Source->Urine   SARS-COV-2 RNA AMPLIFICATION, QUAL   POCT URINE PREGNANCY          Imaging Results           CTA Abdomen and Pelvis (Final result)  Result time 11/26/20 00:11:49    Final result by Nando Singh MD (11/26/20 00:11:49)                 Impression:      Negative CT angiogram of the abdomen or pelvis for acute extravasation or bleed.    No evidence of aneurysm or stenosis.    Hepatic steatosis.    Edematous changes in the wall of the ascending and transverse colon.  Correlate for inflammatory or infectious colitis.  Normal enhancement is present with no evidence of ischemic cause.    No inflammatory change, mass free fluid or bowel obstruction.    This report was flagged in Epic as abnormal.      Electronically signed by: Nando Singh  Date:    11/26/2020  Time:    00:11             Narrative:    EXAMINATION:  CTA ABDOMEN AND PELVIS    CLINICAL HISTORY:  Mesenteric ischemia, acute;    TECHNIQUE:  Angiographic and routine cyst contrast-enhanced CT images of the abdomen and pelvis were performed following the administration of 100 cc of Omnipaque 350 contrast.  Sagittal and coronal reformatted images were obtained are presented for interpretation.    COMPARISON:  Noncontrast CT, 05/14/2020    FINDINGS:  Angiographic sequences:    The abdominal aorta and major branch vessels are patent with no aneurysm or dissection.  There is no evidence of active  extravasation within the abdominal cavity or its contents or in the subcutaneous or muscular regions.  The iliac system as well as the femoral system enhance normally.    Delayed contrast enhanced sequences:    There is mild decreased attenuation throughout the liver without focal mass or biliary ductal dilatation.  The gallbladder, spleen, pancreas, kidneys, pancreas and adrenal glands appear normal.    The loops of  small intestines, appendix and stomach appear unremarkable.  There is mild edema in the ascending and transverse colon but without evidence of obstruction.  The splenic flexure and descending colon appear normal.  Is there is no inflammatory change mass or free fluid.    A pelvic stimulator near the piriformis muscle on the right is noted within the right posterior flank.  No bony lesions are evident.  The lung bases are clear.                                 Medical Decision Making:   History:   Old Medical Records: I decided to obtain old medical records.  Old Records Summarized: records from clinic visits and records from previous admission(s).       <> Summary of Records: Patient has history of DVT, GERD, recurrent UTIs  Initial Assessment:   45 year old female presenting with intractable generalized abdominal pain, nausea/vomiting, BRBPR  Differential Diagnosis:   Appendicitis, cholecystitis, colitis, diverticulitis, acute mesenteric ischemia, pancreatitis  Clinical Tests:   Lab Tests: Ordered and Reviewed  Radiological Study: Ordered and Reviewed  ED Management:  CBC showed leukocytosis > 14, CMP showed bicarbonate of 16 and mildly elevated LFTs. CT Abdomen/Pelvis showed colitis, no appendicitis or cholecystitis. Patient's pain has been controlled with 6mg IV Morphine, 1mg Dilaudid, and Nausea/vomiting controlled with 4mg zofran and 5mg haldol. Patient admitted to observation for intractable pain. While in the ED, patient had big bloody bowel movement, and GI was consulted for further care along  with hospital medicine.  Other:   I have discussed this case with another health care provider.       <> Summary of the Discussion: GI Consulted, appreciate their recommendations.              Attending Attestation:   Physician Attestation Statement for Resident:  As the supervising MD   Physician Attestation Statement: I have personally seen and examined this patient.   I agree with the above history.  - with the following exceptions: This is a 45 year old female with history of DVTs, anxiety, GERD who presents to the ED with significant generalized abdominal pain, nausea, and blood in stool.   Symptoms began within an hour of eating sushi for dinner.   She describes the pain as constant, located all throughout her abdomen, does not radiate.   Patient did not take any medications at home for the pain.   Via EMS, patient has been tachycardic to 110s, and received 100mcgs fentanyl, 4mg zofran without relief.   Patient is in significant pain here int he ED, claims it is 10/10, without radiation, located all throughout the abdomen and not anywhere in particular.   Patient also has nausea and has had multiple episodes of emesis at home.   Patient had more BRBPR but states she has not had a normal bowel movement since yesterday.   Patient denies fevers, chills, diarrhea, constipation, dysuria.   As the supervising MD I agree with the above PE.   - with the following exceptions: Patient is tachycardic but other VSS  She appears uncomfortable  Regular rhythm, no respiratory distress  Lungs CTAB  abd is obese but soft, +diffuse TTP. No guarding or rebound  Rectal exam without tenderness, no external hemorrhoids, no gross blood     As the supervising MD I agree with the above treatment, course, plan, and disposition.   - with the following exceptions: Patient with intractable abdominal pain, N/V despite fentanyl, morphine and dilaudid  CT A/P with colitis  Patient admitted for further management  I have reviewed and agree  with the residents interpretation of the following: lab data and CT scans.                    ED Course as of Nov 27 1356   Wed Nov 25, 2020 2139 BP: 104/73 [LD]   2139 Temp: 97 °F (36.1 °C) [LD]   2139 Pulse: 104 [LD]   2139 Resp: 16 [LD]   2139 SpO2: 96 % [LD]      ED Course User Index  [LD] Mary Pearson MD            Clinical Impression:       ICD-10-CM ICD-9-CM   1. Ischemic colitis  K55.9 557.9   2. Abdominal pain  R10.9 789.00   3. Abdominal pain, RLQ (right lower quadrant)  R10.31 789.03   4. Anxiety state  F41.1 300.00   5. Interstitial cystitis  N30.10 595.1   6. Infectious colitis  A09 009.0                      Disposition:   Disposition: Placed in Observation  Condition: Stable     ED Disposition Condition    Observation                             Melecio Orellana MD  Resident  11/26/20 0214       Mary Pearson MD  11/27/20 [id number]

## 2020-11-26 NOTE — NURSING
"This RN in to see pt--bed alarming. Pt sitting edge of bed, in no apparent distress. States she needs to get up to go to bathroom. While ambulating to bathroom, pt states " I need to throw up." Pt leaning over commode, no emesis noted. This RN then sees pt with R index finger in mouth, attempting to elicit gag reflex. When this RN questions pt's motive, pt states " I have to do it like this. It makes it come out faster." Pt then ambulated back to bed. No emesis noted. This RN contacted Dr Lu's team, spoke with Dr Fulton and updated to pt's status. Will continue to monitor pt.   "

## 2020-11-26 NOTE — ED NOTES
Pt presented to the ed with complaints of abdominal pain, nausea, and rectal bleeding. Pt was given 100mcg of fentanyl and 4mg of zofran. Pt reports 10 out of 10 pain. Pt states no relief from pain. Pt ambulatory but hunched over in pain at this time for specimen collection. Pt reports eating sushi around 4pm and the pain and nausea started within 1 hour of eating. Pt reports blood in stool. Pt in acute distress. Pt is thrashing around in bed at this time. Pt reports it hurts worst when she straightens out. Will continue to monitor. Pt provided call [facility]kins and instructed on proper use.

## 2020-11-26 NOTE — CONSULTS
[facility]    CC: hematochezia    HPI 45 y.o. female w/ history of DVT (no longer on AC), H. Pylori, anxiety, migraines, presents with acute onset, small volume, recurrent hematochezia w/ associated severe abdominal pain.     She notes feeling at baseline before eating sushi at 4 pm yesterday when she had sudden onset diffuse abdominal pain w/ associated nausea and nonbloody emesis as well as small volume hematochezia. She notes roughly 15 episodes of hematochezia, all small volume, since onset. Last stool was brown yesterday, no stools mixed with blood since then. Notes abdominal pain and N/V have not improved since admission. On admission hgb normal and CTA shows edema of ascending and transverse colon. Has never had prior episodes like this. Normal colonoscopy , and essentially normal EGD except +H. Pylori biopsies 3/2019. Denies NSAID use.    Chart reviewed and summarized here, collateral obtained from nursing staff.    Past Medical History  Past Medical History:   Diagnosis Date    Anxiety     Dizziness     DVT (deep venous thrombosis) 2019    left calf    GERD (gastroesophageal reflux disease)     H. pylori infection     Interstitial cystitis     Migraine headache     PONV (postoperative nausea and vomiting)     x1 after Breast Reduction surgery     Past Surgical History  Past Surgical History:   Procedure Laterality Date    BREAST SURGERY      2017     SECTION, CLASSIC      COLONOSCOPY N/A 2016    Procedure: COLONOSCOPY;  Surgeon: Phuong Carrillo MD;  Location: [facility];  Service: Endoscopy;  Laterality: N/A;    CYSTOSCOPY WITH HYDRODISTENSION OF BLADDER N/A 3/13/2019    Procedure: CYSTOSCOPY, WITH BLADDER HYDRODISTENSION;  Surgeon: Yong Anguiano MD;  Location: [address];  Service: Urology;  Laterality: N/A;  45 min    CYSTOSCOPY WITH HYDRODISTENSION OF BLADDER N/A 2020    Procedure: CYSTOSCOPY, WITH BLADDER HYDRODISTENSION;  Surgeon: Yong RIVERA  MD Silvio;  [facility];  Service: Urology;  Laterality: N/A;  45 min    CYSTOSCOPY WITH HYDRODISTENSION OF BLADDER N/A 6/10/2020    Procedure: CYSTOSCOPY, WITH BLADDER HYDRODISTENSION;  Surgeon: Yong Anguiano MD;  [facility];  Service: Urology;  Laterality: N/A;  1hr    ESOPHAGOGASTRODUODENOSCOPY N/A 3/4/2019    Procedure: ESOPHAGOGASTRODUODENOSCOPY (EGD);  Surgeon: Luis Main MD;  Location: [facility] ENDO (4TH FLR);  Service: Endoscopy;  Laterality: N/A;    FLUOROSCOPY  7/22/2020    Procedure: FLUOROSCOPY;  Surgeon: Yong Anguiano MD;  [facility];  Service: Urology;;    HYSTERECTOMY      IMPLANTATION OF PERMANENT SACRAL NERVE STIMULATOR N/A 8/5/2020    Procedure: INSERTION, NEUROSTIMULATOR, PERMANENT, SACRAL STAGE 2;  Surgeon: Yong Anguiano MD;  [facility];  Service: Urology;  Laterality: N/A;  45 minutes       INJECTION OF BOTULINUM TOXIN TYPE A N/A 8/1/2018    Procedure: INJECTION, BOTULINUM TOXIN, TYPE A 100 UNITS;  Surgeon: Yong Anguiano MD;  [facility];  Service: Urology;  Laterality: N/A;    INJECTION OF BOTULINUM TOXIN TYPE A N/A 3/13/2019    Procedure: INJECTION, BOTULINUM TOXIN, TYPE A 100 UNITS;  Surgeon: Yong Anguiano MD;  [facility];  Service: Urology;  Laterality: N/A;    INSTILLATION OF URINARY BLADDER N/A 8/1/2018    Procedure: INSTILLATION, URINARY BLADDER;  Surgeon: Yong Anguiano MD;  [facility];  Service: Urology;  Laterality: N/A;    INSTILLATION OF URINARY BLADDER N/A 3/13/2019    Procedure: INSTILLATION, BLADDER;  Surgeon: Yong Anguiano MD;  [facility];  Service: Urology;  Laterality: N/A;    INSTILLATION OF URINARY BLADDER N/A 5/27/2020    Procedure: INSTILLATION, BLADDER;  Surgeon: Yong Anguiano MD;  [facility];  Service: Urology;  Laterality: N/A;    INSTILLATION OF URINARY BLADDER  6/10/2020    Procedure: INSTILLATION, BLADDER;  Surgeon: Yong RIVERA  "MD Silvio;  Location: [facility] OR [address] [place] [address];  Service: Urology;;    OOPHORECTOMY      TONSILLECTOMY      UPPER GASTROINTESTINAL ENDOSCOPY       Social History  Social History     Tobacco Use    Smoking status: Never Smoker    Smokeless tobacco: Never Used   Substance Use Topics    Alcohol use: No     Alcohol/week: 0.0 standard drinks    Drug use: No       Family History  Family History   Problem Relation Age of Onset    Colon cancer Neg Hx     Esophageal cancer Neg Hx     Inflammatory bowel disease Neg Hx     Celiac disease Neg Hx     Anesthesia problems Neg Hx      Review of Systems  General ROS: negative for chills, fever or weight loss  Psychological ROS: negative for hallucination, depression or suicidal ideation  Ophthalmic ROS: negative for blurry vision, photophobia or eye pain  ENT ROS: negative for epistaxis, sore throat or rhinorrhea  Respiratory ROS: no cough, shortness of breath, or wheezing  Cardiovascular ROS: no chest pain or dyspnea on exertion  Gastrointestinal ROS: +abdominal pain, N/V, hematochezia  Genito-Urinary ROS: no dysuria, trouble voiding, or hematuria  Musculoskeletal ROS: negative for gait disturbance or muscular weakness  Neurological ROS: no syncope or seizures; no ataxia  Dermatological ROS: negative for pruritis, rash and jaundice    Physical Examination  /85 (Patient Position: Lying)   Pulse 89   Temp 97.7 °F (36.5 °C) (Oral)   Resp 20   Ht 5' 3" (1.6 m)   Wt 88.7 kg (195 lb 8.8 oz)   LMP 08/26/2008   SpO2 98%   Breastfeeding No   BMI 34.64 kg/m²   General appearance: alert, cooperative, no distress  HENT: Normocephalic, atraumatic, neck symmetrical, no nasal discharge   Eyes: conjunctivae/corneas clear, PERRL, EOM's intact  Lungs: clear to auscultation bilaterally, no dullness to percussion bilaterally  Heart: regular rate and rhythm without rub; no displacement of the PMI   Abdomen: soft, mild diffuse TTP; bowel sounds normoactive; no organomegaly  Extremities: " extremities symmetric; no clubbing, cyanosis, or edema  Integument: Skin color, texture, turgor normal; no rashes; hair distrubution normal  Neurologic: Alert and oriented X 3, normal strength, normal coordination and gait  Psychiatric: no pressured speech; normal affect; no evidence of impaired cognition     Labs:  Lab Results   Component Value Date    WBC 16.44 (H) 11/26/2020    HGB 14.9 11/26/2020    HCT 46.0 11/26/2020    MCV 93 11/26/2020     11/26/2020          Lab Results   Component Value Date    CREATININE 0.8 11/26/2020    BUN 16 11/26/2020     11/26/2020    K 4.2 11/26/2020     11/26/2020    CO2 21 (L) 11/26/2020      Lab Results   Component Value Date    ALT 64 (H) 11/26/2020    AST 24 11/26/2020    ALKPHOS 169 (H) 11/26/2020    BILITOT 0.4 11/26/2020      Lab Results   Component Value Date    INR 0.9 09/08/2016     Imaging:  Impression:     Negative CT angiogram of the abdomen or pelvis for acute extravasation or bleed.     No evidence of aneurysm or stenosis.     Hepatic steatosis.     Edematous changes in the wall of the ascending and transverse colon.  Correlate for inflammatory or infectious colitis.  Normal enhancement is present with no evidence of ischemic cause.     No inflammatory change, mass free fluid or bowel obstruction.     This report was flagged in Epic as abnormal.  I have personally reviewed and interpreted these images.    Assessment:    45 y.o. female w/ history of DVT (no longer on AC), H. Pylori, anxiety, migraines, presents with acute onset, small volume, recurrent hematochezia w/ associated severe abdominal pain. Hgb remains normal however symptoms have not yet improved from admission. Most likely etiology is bacterial colitis vs. Ischemic colitis (would be unusual at her age but classic presentation) vs. IBD. Normal colonoscopy in 2016.    Plan:  - Continue supportive care w/ IVF, pain control, and antiemetics  - Stool studies ordered to evaluate for  "bacterial colitis  - Will likely need colonoscopy in near future pending work up but this can likely be done as an outpatient once her acute symptoms have improved  - Advance diet as tolerated  - Maintain active Type and Screen, ensure up to date coags  - Two large bore Ivs  - Trend Hgb, transfuse for goal Hgb > 7, or > 8 if indicated by comorbidities  - Contact on call GI with further questions or concerns    Discussed with Dr. Cooper.    Gilberto Garcia (Lee)  [facility] Fellow, PGY IV  Pager: [phone]    "

## 2020-11-26 NOTE — ED NOTES
Review of patient's allergies indicates:   Allergen Reactions    Bactrim [sulfamethoxazole-trimethoprim] Other (See Comments)     headache    Codeine Other (See Comments)     Other reaction(s): Vomiting        Patient has verified the spelling of the name and  on armband.   APPEARANCE: Patient is alert, calm, oriented x 4, and appears distressed at this time. Pt is thrashing around and bed. Pt unable to stay still at this time due to abdominal pain. md notified  SKIN: Skin is normal for race, warm, and dry. Normal skin turgor and mucous membranes moist.  CARDIAC: tachycardiac on arrival.   RESPIRATORY:tachypnea noted at 22-26BPM. Breath sounds clear bilaterally throughout chest. Pt denies any SOB.   GASTRO: Bowel sounds normal, abdomen is soft, no tenderness, and no abdominal distention. Pt reports all over abdominal pain x5 hours. Pt reports eating sushi around 4pm. Pt reports nausea and emesis. Pt reports some blood in stool.   MUSCLE: Full ROM. No bony tenderness or soft tissue tenderness. No obvious deformity.  PERIPHERAL VASCULAR: peripheral pulses present. Normal cap refill. No edema. Warm to touch. Pt denies any numbness or tingling.   NEURO: 5/5 strength major flexors/extensors bilaterally. Sensory intact to light touch bilaterally. Ish coma scale: eyes open spontaneously-4, oriented & converses-5, obeys commands-6. No neurological abnormalities.   MENTAL STATUS: awake, alert and aware of environment.  : Voids without complication, hysterectomy.

## 2020-11-27 ENCOUNTER — TELEPHONE (OUTPATIENT)
Dept: GASTROENTEROLOGY | Facility: CLINIC | Age: 45
End: 2020-11-27

## 2020-11-27 ENCOUNTER — TELEPHONE (OUTPATIENT)
Dept: NEUROLOGY | Facility: HOSPITAL | Age: 45
End: 2020-11-27

## 2020-11-27 VITALS
HEIGHT: 63 IN | TEMPERATURE: 98 F | WEIGHT: 203.06 LBS | RESPIRATION RATE: 18 BRPM | SYSTOLIC BLOOD PRESSURE: 126 MMHG | BODY MASS INDEX: 35.98 KG/M2 | HEART RATE: 93 BPM | OXYGEN SATURATION: 97 % | DIASTOLIC BLOOD PRESSURE: 85 MMHG

## 2020-11-27 PROBLEM — A09 INFECTIOUS COLITIS: Status: ACTIVE | Noted: 2020-11-27

## 2020-11-27 PROBLEM — K55.9 ISCHEMIC COLITIS: Status: RESOLVED | Noted: 2020-11-26 | Resolved: 2020-11-27

## 2020-11-27 LAB
ALBUMIN SERPL BCP-MCNC: 3.5 G/DL (ref 3.5–5.2)
ALP SERPL-CCNC: 150 U/L (ref 55–135)
ALT SERPL W/O P-5'-P-CCNC: 41 U/L (ref 10–44)
ANION GAP SERPL CALC-SCNC: 14 MMOL/L (ref 8–16)
AST SERPL-CCNC: 17 U/L (ref 10–40)
BASOPHILS # BLD AUTO: 0.05 K/UL (ref 0–0.2)
BASOPHILS NFR BLD: 0.3 % (ref 0–1.9)
BILIRUB SERPL-MCNC: 0.5 MG/DL (ref 0.1–1)
BUN SERPL-MCNC: 11 MG/DL (ref 6–20)
C DIFF GDH STL QL: POSITIVE
C DIFF TOX A+B STL QL IA: NEGATIVE
CALCIUM SERPL-MCNC: 8.9 MG/DL (ref 8.7–10.5)
CHLORIDE SERPL-SCNC: 102 MMOL/L (ref 95–110)
CO2 SERPL-SCNC: 21 MMOL/L (ref 23–29)
CREAT SERPL-MCNC: 0.7 MG/DL (ref 0.5–1.4)
CRP SERPL-MCNC: 101.2 MG/L (ref 0–8.2)
DIFFERENTIAL METHOD: ABNORMAL
EOSINOPHIL # BLD AUTO: 0.1 K/UL (ref 0–0.5)
EOSINOPHIL NFR BLD: 0.3 % (ref 0–8)
ERYTHROCYTE [DISTWIDTH] IN BLOOD BY AUTOMATED COUNT: 13.2 % (ref 11.5–14.5)
EST. GFR  (AFRICAN AMERICAN): >60 ML/MIN/1.73 M^2
EST. GFR  (NON AFRICAN AMERICAN): >60 ML/MIN/1.73 M^2
GLUCOSE SERPL-MCNC: 110 MG/DL (ref 70–110)
HCT VFR BLD AUTO: 41.4 % (ref 37–48.5)
HGB BLD-MCNC: 13.6 G/DL (ref 12–16)
IMM GRANULOCYTES # BLD AUTO: 0.21 K/UL (ref 0–0.04)
IMM GRANULOCYTES NFR BLD AUTO: 1.3 % (ref 0–0.5)
LYMPHOCYTES # BLD AUTO: 2.5 K/UL (ref 1–4.8)
LYMPHOCYTES NFR BLD: 15.3 % (ref 18–48)
MCH RBC QN AUTO: 29.9 PG (ref 27–31)
MCHC RBC AUTO-ENTMCNC: 32.9 G/DL (ref 32–36)
MCV RBC AUTO: 91 FL (ref 82–98)
MONOCYTES # BLD AUTO: 1.1 K/UL (ref 0.3–1)
MONOCYTES NFR BLD: 6.8 % (ref 4–15)
NEUTROPHILS # BLD AUTO: 12.6 K/UL (ref 1.8–7.7)
NEUTROPHILS NFR BLD: 76 % (ref 38–73)
NRBC BLD-RTO: 0 /100 WBC
PLATELET # BLD AUTO: 250 K/UL (ref 150–350)
PMV BLD AUTO: 10.7 FL (ref 9.2–12.9)
POTASSIUM SERPL-SCNC: 4 MMOL/L (ref 3.5–5.1)
PROT SERPL-MCNC: 7.4 G/DL (ref 6–8.4)
RBC # BLD AUTO: 4.55 M/UL (ref 4–5.4)
SODIUM SERPL-SCNC: 137 MMOL/L (ref 136–145)
WBC # BLD AUTO: 16.59 K/UL (ref 3.9–12.7)
WBC #/AREA STL HPF: ABNORMAL /[HPF]

## 2020-11-27 PROCEDURE — S0030 INJECTION, METRONIDAZOLE: HCPCS | Performed by: STUDENT IN AN ORGANIZED HEALTH CARE EDUCATION/TRAINING PROGRAM

## 2020-11-27 PROCEDURE — G0378 HOSPITAL OBSERVATION PER HR: HCPCS

## 2020-11-27 PROCEDURE — 87427 SHIGA-LIKE TOXIN AG IA: CPT | Mod: 59

## 2020-11-27 PROCEDURE — 80053 COMPREHEN METABOLIC PANEL: CPT

## 2020-11-27 PROCEDURE — 87324 CLOSTRIDIUM AG IA: CPT | Mod: 59

## 2020-11-27 PROCEDURE — 89055 LEUKOCYTE ASSESSMENT FECAL: CPT

## 2020-11-27 PROCEDURE — 87449 NOS EACH ORGANISM AG IA: CPT

## 2020-11-27 PROCEDURE — 63600175 PHARM REV CODE 636 W HCPCS: Performed by: STUDENT IN AN ORGANIZED HEALTH CARE EDUCATION/TRAINING PROGRAM

## 2020-11-27 PROCEDURE — 36415 COLL VENOUS BLD VENIPUNCTURE: CPT

## 2020-11-27 PROCEDURE — 25000003 PHARM REV CODE 250: Performed by: STUDENT IN AN ORGANIZED HEALTH CARE EDUCATION/TRAINING PROGRAM

## 2020-11-27 PROCEDURE — 96376 TX/PRO/DX INJ SAME DRUG ADON: CPT | Performed by: EMERGENCY MEDICINE

## 2020-11-27 PROCEDURE — 87493 C DIFF AMPLIFIED PROBE: CPT

## 2020-11-27 PROCEDURE — 87046 STOOL CULTR AEROBIC BACT EA: CPT | Mod: 59

## 2020-11-27 PROCEDURE — 87045 FECES CULTURE AEROBIC BACT: CPT

## 2020-11-27 PROCEDURE — 85025 COMPLETE CBC W/AUTO DIFF WBC: CPT

## 2020-11-27 PROCEDURE — 86140 C-REACTIVE PROTEIN: CPT

## 2020-11-27 PROCEDURE — 87209 SMEAR COMPLEX STAIN: CPT

## 2020-11-27 RX ORDER — CIPROFLOXACIN 500 MG/1
500 TABLET ORAL EVERY 12 HOURS
Qty: 10 TABLET | Refills: 0 | Status: SHIPPED | OUTPATIENT
Start: 2020-11-27 | End: 2020-12-02

## 2020-11-27 RX ORDER — METRONIDAZOLE 500 MG/1
500 TABLET ORAL EVERY 12 HOURS
Qty: 10 TABLET | Refills: 0 | Status: SHIPPED | OUTPATIENT
Start: 2020-11-27 | End: 2020-12-02

## 2020-11-27 RX ORDER — ONDANSETRON 4 MG/1
4 TABLET, FILM COATED ORAL EVERY 6 HOURS PRN
Qty: 28 TABLET | Refills: 0 | Status: SHIPPED | OUTPATIENT
Start: 2020-11-27 | End: 2020-12-04

## 2020-11-27 RX ADMIN — PROMETHAZINE HYDROCHLORIDE 12.5 MG: 12.5 TABLET ORAL at 05:11

## 2020-11-27 RX ADMIN — METRONIDAZOLE 500 MG: 500 INJECTION, SOLUTION INTRAVENOUS at 05:11

## 2020-11-27 RX ADMIN — CIPROFLOXACIN 400 MG: 2 INJECTION, SOLUTION INTRAVENOUS at 01:11

## 2020-11-27 RX ADMIN — METRONIDAZOLE 500 MG: 500 INJECTION, SOLUTION INTRAVENOUS at 11:11

## 2020-11-27 RX ADMIN — PROMETHAZINE HYDROCHLORIDE 12.5 MG: 12.5 TABLET ORAL at 02:11

## 2020-11-27 RX ADMIN — MORPHINE SULFATE 4 MG: 2 INJECTION, SOLUTION INTRAMUSCULAR; INTRAVENOUS at 05:11

## 2020-11-27 RX ADMIN — PANTOPRAZOLE SODIUM 40 MG: 40 TABLET, DELAYED RELEASE ORAL at 09:11

## 2020-11-27 NOTE — TELEPHONE ENCOUNTER
Called leia [facility] to let her know that this pt has an appt with Dr. Del Real 12/9/20 at 1030 am

## 2020-11-27 NOTE — PROGRESS NOTES
"[facility]    CC: hematochezia     HPI 45 y.o. female w/ history of DVT (no longer on AC), H. Pylori, anxiety, migraines, presents with acute onset, small volume, recurrent hematochezia w/ associated severe abdominal pain.     Interval Hx:  Feeling much improved today, abdominal pain almost resolved, still some nausea, two episodes of vomiting in last 12 hours. No further hematochezia since evaluation yesterday, stool studies still pending.    Past Medical History  Past Medical History:   Diagnosis Date    Anxiety     Dizziness     DVT (deep venous thrombosis) 04/2019    left calf    GERD (gastroesophageal reflux disease)     H. pylori infection     Interstitial cystitis     Migraine headache     PONV (postoperative nausea and vomiting)     x1 after Breast Reduction surgery       Review of Systems  General ROS: negative for chills, fever or weight loss  Cardiovascular ROS: no chest pain or dyspnea on exertion  Gastrointestinal ROS: + abdominal pain, N/V    Physical Examination  /83   Pulse 98   Temp 97.9 °F (36.6 °C)   Resp 18   Ht 5' 3" (1.6 m)   Wt 92.1 kg (203 lb 0.7 oz)   LMP 08/26/2008   SpO2 97%   Breastfeeding No   BMI 35.97 kg/m²   General appearance: alert, cooperative, no distress  HENT: Normocephalic, atraumatic, neck symmetrical, no nasal discharge   Lungs: clear to auscultation bilaterally, no dullness to percussion bilaterally  Heart: regular rate and rhythm without rub; no displacement of the PMI   Abdomen: soft, non-tender; bowel sounds normoactive; no organomegaly  Extremities: extremities symmetric; no clubbing, cyanosis, or edema  Neurologic: Alert and oriented X 3, normal strength, normal coordination and gait    Labs:  Lab Results   Component Value Date    WBC 16.59 (H) 11/27/2020    HGB 13.6 11/27/2020    HCT 41.4 11/27/2020    MCV 91 11/27/2020     11/27/2020          Lab Results   Component Value Date    CREATININE 0.7 11/27/2020    BUN 11 11/27/2020    " " 11/27/2020    K 4.0 11/27/2020     11/27/2020    CO2 21 (L) 11/27/2020      Lab Results   Component Value Date    ALT 41 11/27/2020    AST 17 11/27/2020    ALKPHOS 150 (H) 11/27/2020    BILITOT 0.5 11/27/2020      Lab Results   Component Value Date    INR 0.9 09/08/2016     Imaging:  No new imaging to review    Assessment:    45 y.o. female w/ history of DVT (no longer on AC), H. Pylori, anxiety, migraines, presents with acute onset, small volume, recurrent hematochezia w/ associated severe abdominal pain. Hgb remains normal however symptoms have not yet improved from admission. Most likely etiology is bacterial colitis vs. Ischemic colitis (would be unusual at her age but classic presentation) vs. IBD. Normal colonoscopy in 2016.     Plan:  - Continue supportive care w/ IVF, pain control, and antiemetics  - Stool studies ordered to evaluate for bacterial colitis, not yet collected  - Will likely need colonoscopy in near future pending work up but this can likely be done as an outpatient once her acute symptoms have improved  - Advance diet as tolerated  - Maintain active Type and Screen, ensure up to date coags  - Two large bore Ivs  - Trend Hgb, transfuse for goal Hgb > 7, or > 8 if indicated by comorbidities  - Contact on call GI with further questions or concerns  - Symptoms have improved significantly today and hgb remains WNL, from GI perspective ok to discharge and I can arrange close outpatient follow up for colonoscopy however patient appears to prefer to defer discharge for now     Discussed with Dr. Cooper.     Gilberto Garcia (Lee)  [facility] Fellow, PGY IV  Pager: [phone]  "

## 2020-11-27 NOTE — PLAN OF CARE
Patient has received discharge instructions. Prescriptions received. Instructions reviewed with pt using teachback method. All questions answered to pt satisfaction. IV access   removed per floor nurse. Transport requested for discharge.

## 2020-11-27 NOTE — TELEPHONE ENCOUNTER
Elaine - ED follow up- schedule pt on 12/7/20 at 8:30 am with Dr. Main. I'll inform Dr. Main that I added her.     ----- Message from Helene Jean Baptiste sent at 11/27/2020  4:05 PM CST -----  Regarding: Appointment  Contact: [phone]  Calling to schedule appointment for colitis within a week following discharge today. Please call to confirm.

## 2020-11-27 NOTE — PLAN OF CARE
Pt vitals were maintained, pt c/o abd pain 5 out of 10. Educated pt on pain scale b/c was sleeping and was awaken to take schedule meds and pt c/o 10/10 pain, after education pt pain rating was a 5/10. Pt denied any nausea and no bowel movements occurred, IV antibiotics and fluids continued. Pt ambulate with standby asst. [facility]

## 2020-11-27 NOTE — DISCHARGE SUMMARY
[facility] Hospital Medicine Discharge Summary    Primary Team: [facility] Hospitalist Team B  Attending Physician: Addi Lu MD  Resident: Kristan Pearce MD    Date of Admit: 11/25/2020  Date of Discharge: 11/27/2020    Discharge to: home   Condition: improved     Discharge Diagnoses     Patient Active Problem List   Diagnosis    Incomplete bladder emptying    Right flank pain    Migraine without aura, with intractable migraine, so stated, without mention of status migrainosus    Anxiety state    Memory loss    Intractable migraine without aura and without status migrainosus    History of diverticulitis    Dizzy    Nocturia    Acute UTI    IC (interstitial cystitis)    Bladder pain    GERD (gastroesophageal reflux disease)    Interstitial cystitis    Strains to urinate    Recurrent UTI    Chronic constipation    Urinary retention with incomplete bladder emptying    Infectious colitis       Consultants and Procedures     Consultants:  GI    Procedures:   None     Imaging:  CTA: negative for bleed, no aneurysm or stenosis     Brief History of Present Illness      Elana Gonzales is a 45 y.o. female with PMH anxiety, DVT (4/2018, s/p Eliquis tx), GERD, and interstitial cystitis. The patient presented to [facility] on 11/25/2020 with a primary complaint of abdominal pain and rectal bleeding. Pt was treated with cipro/flagyl to in case this is infectious colitis. GI consulted and will do a colonoscopy outpatient to assess for inflammatory colitis. Her rectal bleeding stopped, hb remained stable, and pain subsided. She was discharged on cipro/flagyl and with FU with PCP and GI.     For the full HPI please refer to the History & Physical from this admission.    Hospital Course By Problem with Pertinent Findings     Infectious vs imflammatory colitis  -Patient with pain out of proportion to physical exam and BRBPR  -Pain eventually controlled with 5mg Haldol and 8mg Zofran  -CTA abd/pelvis  showing edematous changes in the ascending and transverse colon consistent with inflammatory vs infectious colitis  -GI consulted, colonoscopy outpatient   -FOBT negative  -CBC stable   - discharged on 5 day course of cipro/flagyl to complete 7 days      Anxiety  -resume Xanax 2mg and Elavil 25mg nightly     Interstitial cystitis  -resume Flomax     HCM  -UTD on vaccines, PAP and mammogram  -Continue to follow up with PCP    Discharge Medications      ChristianSherron sandovalsa   Home Medication Instructions MICHEAL:[id number]    Printed on:11/27/20 1610   Medication Information                      ALPRAZolam (XANAX) 2 MG Tab  Take 1 tablet (2 mg total) by mouth nightly as needed.             amitriptyline (ELAVIL) 25 MG tablet  Take 1 tablet (25 mg total) by mouth every evening.             butalbital-acetaminophen-caffeine -40 mg (FIORICET, ESGIC) -40 mg per tablet  Take 1-2 tablets by mouth every 6 (six) hours as needed for Headaches.             ciprofloxacin HCl (CIPRO) 500 MG tablet  Take 1 tablet (500 mg total) by mouth every 12 (twelve) hours. for 5 days             erenumab-aooe (AIMOVIG) 140 mg/mL autoinjector  Inject 1 mL (140 mg total) into the skin every 28 days. NO FURTHER REFILLS WITHOUT APPOINTMENT             famotidine (PEPCID) 40 MG tablet  Take 1 tablet (40 mg total) by mouth nightly as needed (bladder pain).             hydrOXYzine HCL (ATARAX) 25 MG tablet  Take 1 tablet (25 mg total) by mouth every evening.             meclizine (ANTIVERT) 25 mg tablet  TAKE 1 TABLET BY MOUTH TWO TIMES DAILY FOR DIZZINESS             metroNIDAZOLE (FLAGYL) 500 MG tablet  Take 1 tablet (500 mg total) by mouth every 12 (twelve) hours. for 5 days             ondansetron (ZOFRAN) 4 MG tablet  Take 1 tablet (4 mg total) by mouth every 6 (six) hours as needed for Nausea.             oxyCODONE-acetaminophen (PERCOCET)  mg per tablet  Take 1 tablet by mouth every 12 (twelve) hours as needed.              phenazopyridine (PYRIDIUM) 200 MG tablet  TAKE 1 TABLET BY MOUTH THREE TIMES DAILY AS NEEDED FOR PAIN             promethazine (PHENERGAN) 50 MG tablet  Take 50 mg by mouth every 12 (twelve) hours as needed.             tamsulosin (FLOMAX) 0.4 mg Cap  Take one capsule by mouth once daily.                 Discharge Information:   Diet:  Regular     Physical Activity:  As tolerated              Instructions:  1. Take all medications as prescribed  2. Keep all follow-up appointments  3. Return to the hospital or call your primary care physicians if any worsening symptoms such as fever, chest pain, shortness of breath, return of symptoms, or any other concerns.    Follow-Up Appointments:  PCP  U RIVKA Pearce MD  [facility] Internal Medicine, -2

## 2020-11-27 NOTE — PROGRESS NOTES
[facility] Resident HO-2 Progress Note    Subjective:      Pt has had no more rectal bleeding and states she is feeling much better. Denies abdominal pain, n/v.     Objective:   Last 24 Hour Vital Signs:  BP  Min: 121/85  Max: 134/83  Temp  Av.4 °F (36.9 °C)  Min: 97.7 °F (36.5 °C)  Max: 99.2 °F (37.3 °C)  Pulse  Av.8  Min: 89  Max: 105  Resp  Av.3  Min: 15  Max: 20  SpO2  Av %  Min: 97 %  Max: 97 %  Weight  Av.1 kg (203 lb 0.7 oz)  Min: 92.1 kg (203 lb 0.7 oz)  Max: 92.1 kg (203 lb 0.7 oz)  I/O last 3 completed shifts:  In: 2300 [I.V.:2000; IV Piggyback:300]  Out: -     Physical Examination:  Physical Exam  Constitutional:       General: She is not in acute distress.  HENT:      Head: Normocephalic and atraumatic.   Eyes:      Extraocular Movements: Extraocular movements intact.      Pupils: Pupils are equal, round, and reactive to light.   Neck:      Musculoskeletal: Normal range of motion and neck supple.   Cardiovascular:      Rate and Rhythm: Normal rate and regular rhythm.   Pulmonary:      Effort: Pulmonary effort is normal.      Breath sounds: Normal breath sounds.   Abdominal:      General: There is no distension.      Palpations: Abdomen is soft.      Tenderness: There is no abdominal tenderness.   Musculoskeletal: Normal range of motion.         General: No swelling.   Skin:     General: Skin is warm and dry.   Neurological:      General: No focal deficit present.      Mental Status: She is alert and oriented to person, place, and time.   Psychiatric:         Mood and Affect: Mood normal.         Behavior: Behavior normal.     Laboratory:  Laboratory Data Reviewed: yes  Pertinent Findings:wbc 16.59  crp 101.2    Microbiology Data Reviewed: yes  Pertinent Findings:  c diff pending     Radiology Data Reviewed: yes  Pertinent Findings:  Negative CTA     Current Medications:     Infusions:   lactated ringers 150 mL/hr at 20 1025        Scheduled:   ciprofloxacin  400 mg  Intravenous Q12H    metronidazole  500 mg Intravenous Q8H    morphine  4 mg Intravenous Q4H    pantoprazole  40 mg Oral BID        PRN:  HYDROmorphone, promethazine, sodium chloride 0.9%    Antibiotics and Day Number of Therapy:  Cipro/flagyl    Assessment:     Elana Gonzales is a 45 y.o.female with  Patient Active Problem List    Diagnosis Date Noted    Ischemic colitis 11/26/2020    Urinary retention with incomplete bladder emptying 07/22/2020    Recurrent UTI 07/07/2020    Chronic constipation 07/07/2020    Strains to urinate 05/29/2020    Interstitial cystitis 03/13/2019    GERD (gastroesophageal reflux disease) 03/04/2019    Bladder pain 04/13/2018    IC (interstitial cystitis) 08/16/2017    Acute UTI 07/28/2017    Nocturia 07/25/2017    Dizzy 10/04/2016    History of diverticulitis 01/27/2016    Abdominal pain, RLQ (right lower quadrant) 01/14/2016    Intractable migraine without aura and without status migrainosus 10/07/2015    Migraine without aura, with intractable migraine, so stated, without mention of status migrainosus 06/26/2015    Anxiety state 06/26/2015    Memory loss 06/26/2015    Incomplete bladder emptying 06/17/2014    Right flank pain 06/17/2014        Plan:     Ischemic colitis  -Patient with pain out of proportion to physical exam and BRBPR  -Pain eventually controlled with 5mg Haldol and 8mg Zofran  -CTA abd/pelvis showing edematous changes in the ascending and transverse colon consistent with inflammatory vs infectious colitis  -GI consulted, appreciate recs   -FOBT negative  -CBC stable   -LR infusion at 150mL/hr     Anxiety  -Takes Xanax 2mg and Elavil 25mg nightly, will hold for now     Interstitial cystitis  -Takes Flomax, will hold while inpatient  -Currently on 7 day Augmentin regimen     HCM  -UTD on vaccines, PAP and mammogram  -Continue to follow up with PCP     Diet: regular   DVT: SCD  Dispo: likely home today      Code Status:     FULL     Kristan  Portia  [facility] Internal Medicine -2    [facility] Medicine Hospitalist Pager numbers:   [facility] Hospitalist Medicine Team A (Aly/Devonte): [phone]  [facility] Hospitalist Medicine Team B (Lizz/Girish):  [phone]

## 2020-11-27 NOTE — PLAN OF CARE
TN met with pt   lives with  Hair and adult son Ayaan   physical address [address]  [place]     independent prior to admit - no dme, no hh     dx:  colitis   message left with Dr. Del Real' office - pt will be contacted with apt   pt wants a new pcp  --- [facility] office is closed due to holiday --- [place] will contact pt with apt on Monday.     pt has transportation to home         11/27/20 5883   Discharge Assessment   Assessment Type Discharge Planning Assessment   Confirmed/corrected address and phone number on facesheet? Yes   Assessment information obtained from? Patient   Expected Length of Stay (days) 2   Communicated expected length of stay with patient/caregiver yes   Prior to hospitilization cognitive status: Alert/Oriented   Prior to hospitalization functional status: Independent   Current cognitive status: Alert/Oriented   Current Functional Status: Independent   Lives With spouse   Able to Return to Prior Arrangements yes   Is patient able to care for self after discharge? Yes   Who are your caregiver(s) and their phone number(s)?  Hair   [phone];  adult son Ayaan   Patient's perception of discharge disposition home or selfcare   Readmission Within the Last 30 Days no previous admission in last 30 days   Patient currently being followed by outpatient case management? Yes   Patient currently receives any other outside agency services? No   Equipment Currently Used at Home none   Do you have any problems affording any of your prescribed medications? No   Is the patient taking medications as prescribed? yes   Does the patient have transportation home? Yes   Transportation Anticipated family or friend will provide   Does the patient receive services at the [facility]? No   Discharge Plan A Home;Home with family   DME Needed Upon Discharge  none   Patient/Family in Agreement with Plan yes

## 2020-11-27 NOTE — PROGRESS NOTES
Follow-up With  Details  Why  Contact Info   Nick Reeves Jr., MD  In 1 week  office closed for holiday. Please call office Monday for a hospital follow up apt.   [address] [place] [address] [phone]   Keaton Del Real MD  In 2 weeks  you will be contacted by Dr. Del Real' nurse with an apt. per  Ms. Collins   [address]  [facility] [address] [phone]

## 2020-11-28 LAB — C DIFF TOX GENS STL QL NAA+PROBE: NEGATIVE

## 2020-11-29 NOTE — PROGRESS NOTES
Cdif ag positive but toxin negative after patient discharged from hospital, she is still having diarrhea although hematochezia has resolved. Will empirically treat for possible C dif infection as etiology of her colitis is unclear. 14 day course of oral vancomycin rx left as a voicemail rx at [facility] per patient preference. Discussed with patient. Has follow up arranged.

## 2020-11-30 ENCOUNTER — TELEPHONE (OUTPATIENT)
Dept: NEUROLOGY | Facility: HOSPITAL | Age: 45
End: 2020-11-30

## 2020-11-30 LAB
BACTERIA STL CULT: NORMAL
E COLI SXT1 STL QL IA: NEGATIVE
E COLI SXT2 STL QL IA: NEGATIVE
O+P STL MICRO: NORMAL

## 2020-11-30 NOTE — PROGRESS NOTES
Follow-up With  Details  Why  Contact Info   Keaton Del Real MD  In 2 weeks  you will be contacted by Dr. Del Real' nurse with an apt. per  Ms. Collins   [address] [facility] [address]  [facility] [id number] [phone]   [facility]    Nurse  Ebonie will call you Mon 11/30 with a pcp apt - [facility]   [address], [address]  [facility] [address] [phone]   Luis Main MD    Gastroenterologist ---- the office will contact you with an apt - per  Helene RODRIGUEZ   [address] [facility] [address]  [phone]   [facility]  On 12/10/2020  10:00 am   [address], [address]  [facility] [address] [phone]

## 2020-11-30 NOTE — TELEPHONE ENCOUNTER
Pt informed she has 2 hospital follow up appt scheduled.  Appt scheduled with Dr. Del Real on 12/9/20 canceled per pt request.

## 2020-11-30 NOTE — TELEPHONE ENCOUNTER
----- Message from Gilberto Garcia MD sent at 11/27/2020  8:26 PM CST -----  Stefanie was going to set this patient up for GI follow up but already has follow up scheduled with primary GI on 12/7 and Dr. Del Real on 12/9. Just needs colonoscopy so probably can follow with primary GI unless you know something different than me. Doesn't matter to me.  Thanks,  Homero

## 2020-11-30 NOTE — PROGRESS NOTES
per pt request -  TN made apt with a new pcp   Future Appointments   Date Time Provider Department Center   12/7/2020  8:30 AM Luis Main MD [facility]   12/10/2020 10:00 AM Manohar Borges PA-C [facility]     info faxed to pt at [email or web address]

## 2020-12-01 ENCOUNTER — TELEPHONE (OUTPATIENT)
Dept: GASTROENTEROLOGY | Facility: CLINIC | Age: 45
End: 2020-12-01

## 2020-12-01 NOTE — TELEPHONE ENCOUNTER
Spoke to pt,     Pt stated she was prescribed medicine by a doctor at the [place] location the Rx is requiring an PA but the doctor won't do the PA because she's scheduled to follow up in clinic with Dr. Main and not him. Pt is requesting Dr. Main to complete PA.     MA informed pt to have the pharmacy send us the Rx and I'll discuss with Dr. Main.     Pt verbalize understanding and thank MA    ----- Message from Luann Oliveira sent at 12/1/2020  1:16 PM CST -----  Regarding: speak with nurse  Contact: patient  [phone]   please call patient need to speak with the nurse regarding medication waiting on a call back thanks.

## 2020-12-05 NOTE — TELEPHONE ENCOUNTER
[facility] called on 12/5 and stated that [employer] appeal does not qualify as an urgent appeal and they will continue processing under the standard appeals process.

## 2020-12-07 ENCOUNTER — OFFICE VISIT (OUTPATIENT)
Dept: GASTROENTEROLOGY | Facility: CLINIC | Age: 45
End: 2020-12-07
Payer: MEDICAID

## 2020-12-07 ENCOUNTER — TELEPHONE (OUTPATIENT)
Dept: ENDOSCOPY | Facility: HOSPITAL | Age: 45
End: 2020-12-07

## 2020-12-07 VITALS
HEART RATE: 74 BPM | DIASTOLIC BLOOD PRESSURE: 75 MMHG | HEIGHT: 63 IN | BODY MASS INDEX: 35.08 KG/M2 | WEIGHT: 198 LBS | SYSTOLIC BLOOD PRESSURE: 110 MMHG

## 2020-12-07 DIAGNOSIS — K62.5 RECTAL BLEEDING: Primary | ICD-10-CM

## 2020-12-07 DIAGNOSIS — Z01.818 PRE-OP TESTING: Primary | ICD-10-CM

## 2020-12-07 DIAGNOSIS — K52.9 GASTROENTERITIS: ICD-10-CM

## 2020-12-07 DIAGNOSIS — Z12.11 SPECIAL SCREENING FOR MALIGNANT NEOPLASMS, COLON: Primary | ICD-10-CM

## 2020-12-07 PROCEDURE — 99999 PR PBB SHADOW E&M-EST. PATIENT-LVL III: ICD-10-PCS | Mod: PBBFAC,,, | Performed by: INTERNAL MEDICINE

## 2020-12-07 PROCEDURE — 99213 OFFICE O/P EST LOW 20 MIN: CPT | Mod: PBBFAC | Performed by: INTERNAL MEDICINE

## 2020-12-07 PROCEDURE — 99999 PR PBB SHADOW E&M-EST. PATIENT-LVL III: CPT | Mod: PBBFAC,,, | Performed by: INTERNAL MEDICINE

## 2020-12-07 PROCEDURE — 99214 PR OFFICE/OUTPT VISIT, EST, LEVL IV, 30-39 MIN: ICD-10-PCS | Mod: S$PBB,,, | Performed by: INTERNAL MEDICINE

## 2020-12-07 PROCEDURE — 99214 OFFICE O/P EST MOD 30 MIN: CPT | Mod: S$PBB,,, | Performed by: INTERNAL MEDICINE

## 2020-12-07 RX ORDER — DICYCLOMINE HYDROCHLORIDE 20 MG/1
TABLET ORAL
COMMUNITY
Start: 2020-11-24 | End: 2021-02-23

## 2020-12-07 RX ORDER — POLYETHYLENE GLYCOL 3350, SODIUM SULFATE ANHYDROUS, SODIUM BICARBONATE, SODIUM CHLORIDE, POTASSIUM CHLORIDE 236; 22.74; 6.74; 5.86; 2.97 G/4L; G/4L; G/4L; G/4L; G/4L
4 POWDER, FOR SOLUTION ORAL ONCE
Qty: 4000 ML | Refills: 0 | Status: SHIPPED | OUTPATIENT
Start: 2020-12-07 | End: 2020-12-07

## 2020-12-07 RX ORDER — OXYBUTYNIN CHLORIDE 10 MG/1
TABLET, EXTENDED RELEASE ORAL
COMMUNITY
Start: 2020-11-17 | End: 2021-02-23 | Stop reason: SDUPTHER

## 2020-12-07 NOTE — PROGRESS NOTES
[facility] Consultation Note    Reason for Consult:  The primary encounter diagnosis was Rectal bleeding. A diagnosis of Gastroenteritis was also pertinent to this visit.    PCP:   Nick Reeves Jr       Referring MD:  No referring provider defined for this encounter.    Initial HPI:  This is a 45 y.o. female here to follow-up on her abdominal pain  At her last visit she was treated for 2nd time for H pylori  Given Flagyl, tetracycline, bismuth, ppi  Initially treated with clarithromycin, amoxicillin, and omeprazole    Interval history 2020:    Recently admitted at [facility] with colitis of right colon and treated with antibiotics  The C diff antigen was positive but toxin and pcr were negative  Recommendation for outpatient colonoscopy    She got this after eating sushi from [place] [employer]. Had bad periumbilical pain and rectal bleeding.  Pain is resolved.  She takes vit C,D, Oregano, Florastor, and digestive enzymes    ROS:  Constitutional: No fevers, chills, No weight loss  ENT: No allergies  CV: No chest pain  Pulm: No cough, No shortness of breath  Ophtho: No vision changes  GI: see HPI  Derm: No rash  Heme: No lymphadenopathy, No bruising  MSK: + crush injury on foot treated with percocets takes it rarely ()  : + interstitial cystitis, + BV  Endo: No hot or cold intolerance  Neuro: + migraines  Psych: No anxiety, No depression    Medical History:  has a past medical history of Anxiety, Dizziness, DVT (deep venous thrombosis) (2019), GERD (gastroesophageal reflux disease), H. pylori infection, Interstitial cystitis, Migraine headache, and PONV (postoperative nausea and vomiting).    Surgical History:  has a past surgical history that includes Hysterectomy;  section, classic; Oophorectomy; Colonoscopy (N/A, 2016); Tonsillectomy; Injection of botulinum toxin type A (N/A, 2018); Instillation of urinary bladder (N/A, 2018); Esophagogastroduodenoscopy  (N/A, 3/4/2019); Breast surgery; Cystoscopy with hydrodistension of bladder (N/A, 3/13/2019); Instillation of urinary bladder (N/A, 3/13/2019); Injection of botulinum toxin type A (N/A, 3/13/2019); Upper gastrointestinal endoscopy; Cystoscopy with hydrodistension of bladder (N/A, 5/27/2020); Instillation of urinary bladder (N/A, 5/27/2020); Cystoscopy with hydrodistension of bladder (N/A, 6/10/2020); Instillation of urinary bladder (6/10/2020); Fluoroscopy (7/22/2020); and Implantation of permanent sacral nerve stimulator (N/A, 8/5/2020).      Review of patient's allergies indicates:   Allergen Reactions    Bactrim [sulfamethoxazole-trimethoprim] Other (See Comments)     headache    Codeine Other (See Comments)     Other reaction(s): Vomiting       Medication List with Changes/Refills   Current Medications    ALPRAZOLAM (XANAX) 2 MG TAB    Take 1 tablet (2 mg total) by mouth nightly as needed.    AMITRIPTYLINE (ELAVIL) 25 MG TABLET    Take 1 tablet (25 mg total) by mouth every evening.    BUTALBITAL-ACETAMINOPHEN-CAFFEINE -40 MG (FIORICET, ESGIC) -40 MG PER TABLET    Take 1-2 tablets by mouth every 6 (six) hours as needed for Headaches.    DICYCLOMINE (BENTYL) 20 MG TABLET        ERENUMAB-AOOE (AIMOVIG) 140 MG/ML AUTOINJECTOR    Inject 1 mL (140 mg total) into the skin every 28 days. NO FURTHER REFILLS WITHOUT APPOINTMENT    FAMOTIDINE (PEPCID) 40 MG TABLET    Take 1 tablet (40 mg total) by mouth nightly as needed (bladder pain).    HYDROXYZINE HCL (ATARAX) 25 MG TABLET    Take 1 tablet (25 mg total) by mouth every evening.    MECLIZINE (ANTIVERT) 25 MG TABLET    TAKE 1 TABLET BY MOUTH TWO TIMES DAILY FOR DIZZINESS    OXYBUTYNIN (DITROPAN-XL) 10 MG 24 HR TABLET        OXYCODONE-ACETAMINOPHEN (PERCOCET)  MG PER TABLET    Take 1 tablet by mouth every 12 (twelve) hours as needed.    PHENAZOPYRIDINE (PYRIDIUM) 200 MG TABLET    TAKE 1 TABLET BY MOUTH THREE TIMES DAILY AS NEEDED FOR PAIN     "PROMETHAZINE (PHENERGAN) 50 MG TABLET    Take 50 mg by mouth every 12 (twelve) hours as needed.    TAMSULOSIN (FLOMAX) 0.4 MG CAP    Take one capsule by mouth once daily.         Objective Findings:    Vital Signs:  /75 (BP Location: Right arm, Patient Position: Sitting, BP Method: Medium (Automatic))   Pulse 74   Ht 5' 3" (1.6 m)   Wt 89.8 kg (197 lb 15.6 oz)   LMP 08/26/2008   BMI 35.07 kg/m²   Body mass index is 35.07 kg/m².    Physical Exam:  General Appearance: Well appearing in no acute distress  Head:   Normocephalic, without obvious abnormality  Eyes:    No scleral icteru  ENT: Neck supple, Lips, mucosa, and tongue normal; teeth and gums normal  Lungs: CTA bilaterally in anterior and posterior fields, no wheezes, no crackles.  Heart:  Regular rate and rhythm, S1, S2 normal, no murmurs heard  Abdomen: Soft, epigastric tenderness, no guarding or rebound tenderness non distended with positive bowel sounds in all four quadrants.   Extremities: noedema  Skin: No rash  Neurologic: AAO x 3      Labs:  Lab Results   Component Value Date    WBC 16.59 (H) 11/27/2020    HGB 13.6 11/27/2020    HCT 41.4 11/27/2020     11/27/2020    CHOL 211 (H) 08/03/2007    TRIG 72 08/03/2007    HDL 94 (H) 08/03/2007    ALT 41 11/27/2020    AST 17 11/27/2020     11/27/2020    K 4.0 11/27/2020     11/27/2020    CREATININE 0.7 11/27/2020    BUN 11 11/27/2020    CO2 21 (L) 11/27/2020    TSH 1.5 08/03/2007    INR 0.9 09/08/2016       H pylori: Results for JUAREZ HAND (MRN [id number]) as of 12/2/2019 08:55   Ref. Range 6/5/2019 07:55 9/19/2019 13:26 11/15/2019 10:33   H. Pylori Antigen, Stool Latest Ref Range: Not detected  Detected (A) Not detected Not detected       Imaging:  CT 2016 - wnl  U/s 7/19 - wnl  CT 2020 - Edematous changes in the wall of the ascending and transverse colon.  Correlate for inflammatory or infectious colitis.  Normal enhancement is present with no evidence of ischemic " cause.    Endoscopy:    03/04/2019 EGD- Gastric inlet patch seen in upper esophagus                        - A single lesion suspicious for aberrant pancreas                         was found in the stomach. Biopsied. Treated with                         bipolar cautery for post biopsy bleeding.                        - Normal examined duodenum. Biopsied.                        - Biopsies were taken with a cold forceps for                         Helicobacter pylori testing.  -biopsies were positive for H pylori, +intestinal metaplasia    Colon 2016 - wnl    Assessment:  1. Rectal bleeding    2. Gastroenteritis            Recommendations:  1.  C diff does not need treatment as she is PCR negative  2.  Abdominal ultrasound to rule out gallstones - normal  3.  Will resume her elavil at lower dose of 25 mg as her GI symptoms all got worse off of it and she likely has IBS with her IC and migraine history  4. Colonoscopy for rectal bleeding  5. If still bloated after these issue consider breath testing for SIBO    No follow-ups on file.      Order summary:  Orders Placed This Encounter    Case Request Endoscopy: COLONOSCOPY         Thank you so much for allowing me to participate in the care of Elana Main MD

## 2020-12-14 ENCOUNTER — TELEPHONE (OUTPATIENT)
Dept: GASTROENTEROLOGY | Facility: CLINIC | Age: 45
End: 2020-12-14

## 2020-12-14 ENCOUNTER — TELEPHONE (OUTPATIENT)
Dept: NEUROLOGY | Facility: CLINIC | Age: 45
End: 2020-12-14

## 2020-12-14 DIAGNOSIS — K52.9 GASTROENTERITIS: Primary | ICD-10-CM

## 2020-12-14 NOTE — TELEPHONE ENCOUNTER
Incoming call from patient to check status of [employer] approval. Advised patient that appeal was submitted 12/4 as urgent but plan is processing as standard. Reviewed that timeframe for standard appeal is 30 days. Patient voiced understanding.

## 2020-12-14 NOTE — TELEPHONE ENCOUNTER
----- Message from Charley Diallo sent at 12/14/2020 11:10 AM CST -----  Regarding: questions  Contact: pt [phone]  Pt is calling to speak with a nurse in the office. Please contact pt

## 2020-12-14 NOTE — TELEPHONE ENCOUNTER
MA spoke to pt,     Pt stated she will like to have egd/colon at the same time.     MA informed pt I will send request to Dr. Main     Pt verbalize understanding and thank MA     ----- Message from Nick Bautista sent at 12/14/2020  2:48 PM CST -----  Contact: Pt. [phone]  The patient would like to speak to someone regarding her upcoming procedure. Please contact the patient to discuss further.

## 2020-12-14 NOTE — TELEPHONE ENCOUNTER
Pt requesting a refill on fioricet which was last refilled in 2016. Last visit with dr thorpe was >1 year ago. Offered pt an appt; but she states that she will call her PCP to get refill.   Pt is COVID positive

## 2020-12-15 NOTE — TELEPHONE ENCOUNTER
MA informed pt per Dr. Main - Signed, just let her know if she switches from a colon to a double they may have to reschedule her and push her back. I hadn't put her in for an EGD originally because she had just had one last year.     Pt verbalize understanding, pt will contact [place] schedulers and thank MA

## 2020-12-15 NOTE — TELEPHONE ENCOUNTER
Appeal still under medical review per PA representative /  ,Kit JC. He states [facility] has thirty days to make decision from 12/05.

## 2020-12-15 NOTE — TELEPHONE ENCOUNTER
Incoming call from patient to check status of [employer] approval. Reminded patient that appeal process may take up to 30 days, and appeal was submitted on 12/4/20. Patient understands and denies any further questions.

## 2021-01-04 ENCOUNTER — TELEPHONE (OUTPATIENT)
Dept: UROLOGY | Facility: CLINIC | Age: 46
End: 2021-01-04

## 2021-01-04 ENCOUNTER — TELEPHONE (OUTPATIENT)
Dept: PHARMACY | Facility: CLINIC | Age: 46
End: 2021-01-04

## 2021-01-11 ENCOUNTER — TELEPHONE (OUTPATIENT)
Dept: UROLOGY | Facility: CLINIC | Age: 46
End: 2021-01-11

## 2021-01-11 ENCOUNTER — LAB VISIT (OUTPATIENT)
Dept: FAMILY MEDICINE | Facility: CLINIC | Age: 46
End: 2021-01-11
Payer: MEDICAID

## 2021-01-11 DIAGNOSIS — R39.89 BLADDER PAIN: ICD-10-CM

## 2021-01-11 DIAGNOSIS — F41.9 ANXIETY: ICD-10-CM

## 2021-01-11 DIAGNOSIS — Z01.818 PRE-OP TESTING: ICD-10-CM

## 2021-01-11 DIAGNOSIS — R39.16 STRAINS TO URINATE: Primary | ICD-10-CM

## 2021-01-11 PROCEDURE — U0003 INFECTIOUS AGENT DETECTION BY NUCLEIC ACID (DNA OR RNA); SEVERE ACUTE RESPIRATORY SYNDROME CORONAVIRUS 2 (SARS-COV-2) (CORONAVIRUS DISEASE [COVID-19]), AMPLIFIED PROBE TECHNIQUE, MAKING USE OF HIGH THROUGHPUT TECHNOLOGIES AS DESCRIBED BY CMS-2020-01-R: HCPCS

## 2021-01-11 RX ORDER — ALPRAZOLAM 2 MG/1
2 TABLET ORAL NIGHTLY PRN
Qty: 30 TABLET | Refills: 2 | Status: SHIPPED | OUTPATIENT
Start: 2021-01-11 | End: 2021-02-23 | Stop reason: SDUPTHER

## 2021-01-12 ENCOUNTER — TELEPHONE (OUTPATIENT)
Dept: UROLOGY | Facility: CLINIC | Age: 46
End: 2021-01-12

## 2021-01-12 LAB — SARS-COV-2 RNA RESP QL NAA+PROBE: NOT DETECTED

## 2021-01-14 ENCOUNTER — ANESTHESIA EVENT (OUTPATIENT)
Dept: ENDOSCOPY | Facility: HOSPITAL | Age: 46
End: 2021-01-14
Payer: MEDICAID

## 2021-01-14 ENCOUNTER — ANESTHESIA (OUTPATIENT)
Dept: ENDOSCOPY | Facility: HOSPITAL | Age: 46
End: 2021-01-14
Payer: MEDICAID

## 2021-01-14 ENCOUNTER — HOSPITAL ENCOUNTER (OUTPATIENT)
Facility: HOSPITAL | Age: 46
Discharge: HOME | End: 2021-01-14
Attending: INTERNAL MEDICINE | Admitting: INTERNAL MEDICINE
Payer: MEDICAID

## 2021-01-14 VITALS
OXYGEN SATURATION: 100 % | HEART RATE: 88 BPM | SYSTOLIC BLOOD PRESSURE: 110 MMHG | RESPIRATION RATE: 18 BRPM | TEMPERATURE: 98 F | HEIGHT: 63 IN | DIASTOLIC BLOOD PRESSURE: 66 MMHG | WEIGHT: 195 LBS | BODY MASS INDEX: 34.55 KG/M2

## 2021-01-14 DIAGNOSIS — A09 INFECTIOUS COLITIS: Primary | ICD-10-CM

## 2021-01-14 DIAGNOSIS — R93.5 ABNORMAL ABDOMINAL CT SCAN: ICD-10-CM

## 2021-01-14 DIAGNOSIS — Z12.11 SPECIAL SCREENING FOR MALIGNANT NEOPLASMS, COLON: ICD-10-CM

## 2021-01-14 PROCEDURE — E9220 PRA ENDO ANESTHESIA: ICD-10-PCS | Mod: ,,, | Performed by: NURSE ANESTHETIST, CERTIFIED REGISTERED

## 2021-01-14 PROCEDURE — 25000003 PHARM REV CODE 250: Performed by: NURSE ANESTHETIST, CERTIFIED REGISTERED

## 2021-01-14 PROCEDURE — E9220 PRA ENDO ANESTHESIA: HCPCS | Mod: ,,, | Performed by: NURSE ANESTHETIST, CERTIFIED REGISTERED

## 2021-01-14 PROCEDURE — 25000003 PHARM REV CODE 250: Performed by: INTERNAL MEDICINE

## 2021-01-14 PROCEDURE — 00813 ANES UPR LWR GI NDSC PX: CPT | Performed by: INTERNAL MEDICINE

## 2021-01-14 PROCEDURE — 37000009 HC ANESTHESIA EA ADD 15 MINS: Performed by: INTERNAL MEDICINE

## 2021-01-14 PROCEDURE — 43235 EGD DIAGNOSTIC BRUSH WASH: CPT | Performed by: INTERNAL MEDICINE

## 2021-01-14 PROCEDURE — 63600175 PHARM REV CODE 636 W HCPCS: Performed by: NURSE ANESTHETIST, CERTIFIED REGISTERED

## 2021-01-14 PROCEDURE — 43235 PR EGD, FLEX, DIAGNOSTIC: ICD-10-PCS | Mod: 51,,, | Performed by: INTERNAL MEDICINE

## 2021-01-14 PROCEDURE — 37000008 HC ANESTHESIA 1ST 15 MINUTES: Performed by: INTERNAL MEDICINE

## 2021-01-14 PROCEDURE — 45378 DIAGNOSTIC COLONOSCOPY: CPT | Performed by: INTERNAL MEDICINE

## 2021-01-14 PROCEDURE — G0121 COLON CA SCRN NOT HI RSK IND: HCPCS | Performed by: INTERNAL MEDICINE

## 2021-01-14 PROCEDURE — 43235 EGD DIAGNOSTIC BRUSH WASH: CPT | Mod: 51,,, | Performed by: INTERNAL MEDICINE

## 2021-01-14 PROCEDURE — 45378 PR COLONOSCOPY,DIAGNOSTIC: ICD-10-PCS | Mod: ,,, | Performed by: INTERNAL MEDICINE

## 2021-01-14 PROCEDURE — 45378 DIAGNOSTIC COLONOSCOPY: CPT | Mod: ,,, | Performed by: INTERNAL MEDICINE

## 2021-01-14 RX ORDER — PROPOFOL 10 MG/ML
VIAL (ML) INTRAVENOUS
Status: DISCONTINUED | OUTPATIENT
Start: 2021-01-14 | End: 2021-01-14

## 2021-01-14 RX ORDER — SODIUM CHLORIDE 9 MG/ML
INJECTION, SOLUTION INTRAVENOUS CONTINUOUS
Status: DISCONTINUED | OUTPATIENT
Start: 2021-01-14 | End: 2021-01-14 | Stop reason: HOSPADM

## 2021-01-14 RX ORDER — LIDOCAINE HYDROCHLORIDE 20 MG/ML
INJECTION INTRAVENOUS
Status: DISCONTINUED | OUTPATIENT
Start: 2021-01-14 | End: 2021-01-14

## 2021-01-14 RX ORDER — PROPOFOL 10 MG/ML
VIAL (ML) INTRAVENOUS CONTINUOUS PRN
Status: DISCONTINUED | OUTPATIENT
Start: 2021-01-14 | End: 2021-01-14

## 2021-01-14 RX ADMIN — LIDOCAINE HYDROCHLORIDE 100 MG: 20 INJECTION, SOLUTION INTRAVENOUS at 01:01

## 2021-01-14 RX ADMIN — SODIUM CHLORIDE: 0.9 INJECTION, SOLUTION INTRAVENOUS at 01:01

## 2021-01-14 RX ADMIN — PROPOFOL 50 MG: 10 INJECTION, EMULSION INTRAVENOUS at 01:01

## 2021-01-14 RX ADMIN — SODIUM CHLORIDE 10 ML/HR: 0.9 INJECTION, SOLUTION INTRAVENOUS at 12:01

## 2021-01-14 RX ADMIN — PROPOFOL 200 MCG/KG/MIN: 10 INJECTION, EMULSION INTRAVENOUS at 01:01

## 2021-01-14 RX ADMIN — PROPOFOL 100 MG: 10 INJECTION, EMULSION INTRAVENOUS at 01:01

## 2021-02-23 ENCOUNTER — RESEARCH ENCOUNTER (OUTPATIENT)
Dept: RESEARCH | Facility: HOSPITAL | Age: 46
End: 2021-02-23

## 2021-02-23 ENCOUNTER — OFFICE VISIT (OUTPATIENT)
Dept: UROLOGY | Facility: CLINIC | Age: 46
End: 2021-02-23
Payer: MEDICAID

## 2021-02-23 VITALS — DIASTOLIC BLOOD PRESSURE: 85 MMHG | HEART RATE: 85 BPM | SYSTOLIC BLOOD PRESSURE: 125 MMHG

## 2021-02-23 DIAGNOSIS — R39.16 STRAINS TO URINATE: ICD-10-CM

## 2021-02-23 DIAGNOSIS — F41.9 ANXIETY: ICD-10-CM

## 2021-02-23 DIAGNOSIS — K21.9 GASTROESOPHAGEAL REFLUX DISEASE: ICD-10-CM

## 2021-02-23 DIAGNOSIS — N32.81 OAB (OVERACTIVE BLADDER): ICD-10-CM

## 2021-02-23 DIAGNOSIS — N30.10 IC (INTERSTITIAL CYSTITIS): Primary | ICD-10-CM

## 2021-02-23 PROCEDURE — 99214 PR OFFICE/OUTPT VISIT, EST, LEVL IV, 30-39 MIN: ICD-10-PCS | Mod: S$PBB,,, | Performed by: UROLOGY

## 2021-02-23 PROCEDURE — 99214 OFFICE O/P EST MOD 30 MIN: CPT | Mod: S$PBB,,, | Performed by: UROLOGY

## 2021-02-23 PROCEDURE — 99999 PR PBB SHADOW E&M-EST. PATIENT-LVL III: CPT | Mod: PBBFAC,,, | Performed by: UROLOGY

## 2021-02-23 PROCEDURE — 99213 OFFICE O/P EST LOW 20 MIN: CPT | Mod: PBBFAC | Performed by: UROLOGY

## 2021-02-23 PROCEDURE — 99999 PR PBB SHADOW E&M-EST. PATIENT-LVL III: ICD-10-PCS | Mod: PBBFAC,,, | Performed by: UROLOGY

## 2021-02-23 RX ORDER — TAMSULOSIN HYDROCHLORIDE 0.4 MG/1
CAPSULE ORAL
Qty: 30 CAPSULE | Refills: 11 | Status: SHIPPED | OUTPATIENT
Start: 2021-02-23 | End: 2022-03-22

## 2021-02-23 RX ORDER — FAMOTIDINE 40 MG/1
40 TABLET, FILM COATED ORAL NIGHTLY PRN
Qty: 30 TABLET | Refills: 11 | Status: SHIPPED | OUTPATIENT
Start: 2021-02-23 | End: 2021-03-19 | Stop reason: SDUPTHER

## 2021-02-23 RX ORDER — OXYBUTYNIN CHLORIDE 10 MG/1
10 TABLET, EXTENDED RELEASE ORAL DAILY
Qty: 30 TABLET | Refills: 11 | Status: SHIPPED | OUTPATIENT
Start: 2021-02-23 | End: 2022-02-23

## 2021-02-23 RX ORDER — ALPRAZOLAM 2 MG/1
2 TABLET ORAL NIGHTLY PRN
Qty: 30 TABLET | Refills: 2 | Status: SHIPPED | OUTPATIENT
Start: 2021-02-23 | End: 2021-04-12

## 2021-02-23 RX ORDER — HYDROXYZINE HYDROCHLORIDE 25 MG/1
25 TABLET, FILM COATED ORAL NIGHTLY
Qty: 30 TABLET | Refills: 11 | Status: SHIPPED | OUTPATIENT
Start: 2021-02-23 | End: 2021-03-25

## 2021-03-19 DIAGNOSIS — K21.9 GASTROESOPHAGEAL REFLUX DISEASE: ICD-10-CM

## 2021-03-19 DIAGNOSIS — N30.10 IC (INTERSTITIAL CYSTITIS): ICD-10-CM

## 2021-03-19 RX ORDER — FAMOTIDINE 40 MG/1
40 TABLET, FILM COATED ORAL NIGHTLY PRN
Qty: 30 TABLET | Refills: 11 | Status: SHIPPED | OUTPATIENT
Start: 2021-03-19 | End: 2022-04-08 | Stop reason: SDUPTHER

## 2021-04-16 ENCOUNTER — TELEPHONE (OUTPATIENT)
Dept: UROLOGY | Facility: CLINIC | Age: 46
End: 2021-04-16

## 2021-04-19 ENCOUNTER — OFFICE VISIT (OUTPATIENT)
Dept: UROLOGY | Facility: CLINIC | Age: 46
End: 2021-04-19
Payer: MEDICAID

## 2021-04-19 DIAGNOSIS — N39.0 RECURRENT UTI: Primary | ICD-10-CM

## 2021-04-19 DIAGNOSIS — M79.673 PAIN OF FOOT, UNSPECIFIED LATERALITY: ICD-10-CM

## 2021-04-19 DIAGNOSIS — R33.9 URINARY RETENTION WITH INCOMPLETE BLADDER EMPTYING: ICD-10-CM

## 2021-04-19 PROCEDURE — 99214 OFFICE O/P EST MOD 30 MIN: CPT | Mod: 95,,, | Performed by: UROLOGY

## 2021-04-19 PROCEDURE — 99214 PR OFFICE/OUTPT VISIT, EST, LEVL IV, 30-39 MIN: ICD-10-PCS | Mod: 95,,, | Performed by: UROLOGY

## 2021-04-21 ENCOUNTER — OFFICE VISIT (OUTPATIENT)
Dept: UROLOGY | Facility: CLINIC | Age: 46
End: 2021-04-21
Payer: MEDICAID

## 2021-04-21 VITALS
HEART RATE: 76 BPM | SYSTOLIC BLOOD PRESSURE: 132 MMHG | BODY MASS INDEX: 35.44 KG/M2 | DIASTOLIC BLOOD PRESSURE: 74 MMHG | HEIGHT: 63 IN | WEIGHT: 200 LBS | RESPIRATION RATE: 15 BRPM

## 2021-04-21 DIAGNOSIS — Z46.6 ENCOUNTER FOR FOLEY CATHETER REMOVAL: Primary | ICD-10-CM

## 2021-04-21 PROCEDURE — 99999 PR PBB SHADOW E&M-EST. PATIENT-LVL III: CPT | Mod: PBBFAC,,, | Performed by: PHYSICIAN ASSISTANT

## 2021-04-21 PROCEDURE — 99213 OFFICE O/P EST LOW 20 MIN: CPT | Mod: PBBFAC | Performed by: PHYSICIAN ASSISTANT

## 2021-04-21 PROCEDURE — 99213 OFFICE O/P EST LOW 20 MIN: CPT | Mod: S$PBB,,, | Performed by: PHYSICIAN ASSISTANT

## 2021-04-21 PROCEDURE — 99999 PR PBB SHADOW E&M-EST. PATIENT-LVL III: ICD-10-PCS | Mod: PBBFAC,,, | Performed by: PHYSICIAN ASSISTANT

## 2021-04-21 PROCEDURE — 99213 PR OFFICE/OUTPT VISIT, EST, LEVL III, 20-29 MIN: ICD-10-PCS | Mod: S$PBB,,, | Performed by: PHYSICIAN ASSISTANT

## 2021-07-01 ENCOUNTER — TELEPHONE (OUTPATIENT)
Dept: UROLOGY | Facility: CLINIC | Age: 46
End: 2021-07-01

## 2021-07-09 DIAGNOSIS — F41.9 ANXIETY: ICD-10-CM

## 2021-07-12 RX ORDER — ALPRAZOLAM 2 MG/1
2 TABLET ORAL NIGHTLY PRN
Qty: 30 TABLET | Refills: 1 | Status: SHIPPED | OUTPATIENT
Start: 2021-07-12 | End: 2021-09-20 | Stop reason: SDUPTHER

## 2021-07-15 ENCOUNTER — PATIENT MESSAGE (OUTPATIENT)
Dept: OTOLARYNGOLOGY | Facility: CLINIC | Age: 46
End: 2021-07-15

## 2021-08-02 ENCOUNTER — OFFICE VISIT (OUTPATIENT)
Dept: NEUROLOGY | Facility: CLINIC | Age: 46
End: 2021-08-02
Payer: MEDICAID

## 2021-08-02 ENCOUNTER — LAB VISIT (OUTPATIENT)
Dept: LAB | Facility: HOSPITAL | Age: 46
End: 2021-08-02
Attending: PSYCHIATRY & NEUROLOGY
Payer: MEDICAID

## 2021-08-02 VITALS
DIASTOLIC BLOOD PRESSURE: 86 MMHG | SYSTOLIC BLOOD PRESSURE: 124 MMHG | HEIGHT: 63 IN | HEART RATE: 90 BPM | BODY MASS INDEX: 35.44 KG/M2 | WEIGHT: 200 LBS

## 2021-08-02 DIAGNOSIS — G43.011 INTRACTABLE MIGRAINE WITHOUT AURA AND WITH STATUS MIGRAINOSUS: Primary | ICD-10-CM

## 2021-08-02 DIAGNOSIS — Z51.81 MEDICATION MONITORING ENCOUNTER: ICD-10-CM

## 2021-08-02 LAB
25(OH)D3+25(OH)D2 SERPL-MCNC: 44 NG/ML (ref 30–96)
ALBUMIN SERPL BCP-MCNC: 4 G/DL (ref 3.5–5.2)
ALP SERPL-CCNC: 155 U/L (ref 55–135)
ALT SERPL W/O P-5'-P-CCNC: 25 U/L (ref 10–44)
ANION GAP SERPL CALC-SCNC: 8 MMOL/L (ref 8–16)
AST SERPL-CCNC: 22 U/L (ref 10–40)
BASOPHILS # BLD AUTO: 0.08 K/UL (ref 0–0.2)
BASOPHILS NFR BLD: 1.1 % (ref 0–1.9)
BILIRUB SERPL-MCNC: 0.3 MG/DL (ref 0.1–1)
BUN SERPL-MCNC: 12 MG/DL (ref 6–20)
CALCIUM SERPL-MCNC: 10.3 MG/DL (ref 8.7–10.5)
CHLORIDE SERPL-SCNC: 106 MMOL/L (ref 95–110)
CO2 SERPL-SCNC: 26 MMOL/L (ref 23–29)
CREAT SERPL-MCNC: 0.8 MG/DL (ref 0.5–1.4)
DIFFERENTIAL METHOD: NORMAL
EOSINOPHIL # BLD AUTO: 0.2 K/UL (ref 0–0.5)
EOSINOPHIL NFR BLD: 3 % (ref 0–8)
ERYTHROCYTE [DISTWIDTH] IN BLOOD BY AUTOMATED COUNT: 12.8 % (ref 11.5–14.5)
EST. GFR  (AFRICAN AMERICAN): >60 ML/MIN/1.73 M^2
EST. GFR  (NON AFRICAN AMERICAN): >60 ML/MIN/1.73 M^2
GLUCOSE SERPL-MCNC: 95 MG/DL (ref 70–110)
HCT VFR BLD AUTO: 39.6 % (ref 37–48.5)
HGB BLD-MCNC: 13.1 G/DL (ref 12–16)
IMM GRANULOCYTES # BLD AUTO: 0.02 K/UL (ref 0–0.04)
IMM GRANULOCYTES NFR BLD AUTO: 0.3 % (ref 0–0.5)
LYMPHOCYTES # BLD AUTO: 2.8 K/UL (ref 1–4.8)
LYMPHOCYTES NFR BLD: 37.8 % (ref 18–48)
MCH RBC QN AUTO: 30.4 PG (ref 27–31)
MCHC RBC AUTO-ENTMCNC: 33.1 G/DL (ref 32–36)
MCV RBC AUTO: 92 FL (ref 82–98)
MONOCYTES # BLD AUTO: 0.6 K/UL (ref 0.3–1)
MONOCYTES NFR BLD: 7.8 % (ref 4–15)
NEUTROPHILS # BLD AUTO: 3.7 K/UL (ref 1.8–7.7)
NEUTROPHILS NFR BLD: 50 % (ref 38–73)
NRBC BLD-RTO: 0 /100 WBC
PLATELET # BLD AUTO: 340 K/UL (ref 150–450)
PMV BLD AUTO: 10.5 FL (ref 9.2–12.9)
POTASSIUM SERPL-SCNC: 4.4 MMOL/L (ref 3.5–5.1)
PROT SERPL-MCNC: 8.3 G/DL (ref 6–8.4)
RBC # BLD AUTO: 4.31 M/UL (ref 4–5.4)
SODIUM SERPL-SCNC: 140 MMOL/L (ref 136–145)
WBC # BLD AUTO: 7.33 K/UL (ref 3.9–12.7)

## 2021-08-02 PROCEDURE — 99214 PR OFFICE/OUTPT VISIT, EST, LEVL IV, 30-39 MIN: ICD-10-PCS | Mod: S$PBB,,, | Performed by: PSYCHIATRY & NEUROLOGY

## 2021-08-02 PROCEDURE — 82306 VITAMIN D 25 HYDROXY: CPT | Performed by: PSYCHIATRY & NEUROLOGY

## 2021-08-02 PROCEDURE — 85025 COMPLETE CBC W/AUTO DIFF WBC: CPT | Performed by: PSYCHIATRY & NEUROLOGY

## 2021-08-02 PROCEDURE — 99213 OFFICE O/P EST LOW 20 MIN: CPT | Mod: PBBFAC | Performed by: PSYCHIATRY & NEUROLOGY

## 2021-08-02 PROCEDURE — 99999 PR PBB SHADOW E&M-EST. PATIENT-LVL III: CPT | Mod: PBBFAC,,, | Performed by: PSYCHIATRY & NEUROLOGY

## 2021-08-02 PROCEDURE — 99999 PR PBB SHADOW E&M-EST. PATIENT-LVL III: ICD-10-PCS | Mod: PBBFAC,,, | Performed by: PSYCHIATRY & NEUROLOGY

## 2021-08-02 PROCEDURE — 99214 OFFICE O/P EST MOD 30 MIN: CPT | Mod: S$PBB,,, | Performed by: PSYCHIATRY & NEUROLOGY

## 2021-08-02 PROCEDURE — 36415 COLL VENOUS BLD VENIPUNCTURE: CPT | Performed by: PSYCHIATRY & NEUROLOGY

## 2021-08-02 PROCEDURE — 80053 COMPREHEN METABOLIC PANEL: CPT | Performed by: PSYCHIATRY & NEUROLOGY

## 2021-08-02 RX ORDER — RIZATRIPTAN BENZOATE 10 MG/1
TABLET ORAL
Qty: 9 TABLET | Refills: 11 | Status: SHIPPED | OUTPATIENT
Start: 2021-08-02 | End: 2024-03-18 | Stop reason: CLARIF

## 2021-08-24 ENCOUNTER — OFFICE VISIT (OUTPATIENT)
Dept: UROLOGY | Facility: CLINIC | Age: 46
End: 2021-08-24
Payer: MEDICAID

## 2021-08-24 VITALS
HEIGHT: 67 IN | SYSTOLIC BLOOD PRESSURE: 113 MMHG | DIASTOLIC BLOOD PRESSURE: 80 MMHG | HEART RATE: 88 BPM | WEIGHT: 199.94 LBS | BODY MASS INDEX: 31.38 KG/M2

## 2021-08-24 DIAGNOSIS — Z96.82 PRESENCE OF NEUROSTIMULATOR: ICD-10-CM

## 2021-08-24 DIAGNOSIS — N30.00 ACUTE CYSTITIS WITHOUT HEMATURIA: Primary | ICD-10-CM

## 2021-08-24 DIAGNOSIS — N39.0 RECURRENT UTI: ICD-10-CM

## 2021-08-24 PROCEDURE — 95971 ALYS SMPL SP/PN NPGT W/PRGRM: CPT | Mod: PBBFAC | Performed by: UROLOGY

## 2021-08-24 PROCEDURE — 87086 URINE CULTURE/COLONY COUNT: CPT | Performed by: UROLOGY

## 2021-08-24 PROCEDURE — 99213 OFFICE O/P EST LOW 20 MIN: CPT | Mod: PBBFAC,25 | Performed by: UROLOGY

## 2021-08-24 PROCEDURE — 99214 OFFICE O/P EST MOD 30 MIN: CPT | Mod: 25,S$PBB,, | Performed by: UROLOGY

## 2021-08-24 PROCEDURE — 95971 ALYS SMPL SP/PN NPGT W/PRGRM: CPT | Mod: S$PBB,,, | Performed by: UROLOGY

## 2021-08-24 PROCEDURE — 99214 PR OFFICE/OUTPT VISIT, EST, LEVL IV, 30-39 MIN: ICD-10-PCS | Mod: 25,S$PBB,, | Performed by: UROLOGY

## 2021-08-24 PROCEDURE — 99999 PR PBB SHADOW E&M-EST. PATIENT-LVL III: CPT | Mod: PBBFAC,,, | Performed by: UROLOGY

## 2021-08-24 PROCEDURE — 99999 PR PBB SHADOW E&M-EST. PATIENT-LVL III: ICD-10-PCS | Mod: PBBFAC,,, | Performed by: UROLOGY

## 2021-08-24 PROCEDURE — 95971 PR ANALYZE NEUROSTIM,SIMPLE/PROG: ICD-10-PCS | Mod: S$PBB,,, | Performed by: UROLOGY

## 2021-08-25 LAB
BACTERIA UR CULT: NORMAL
BACTERIA UR CULT: NORMAL

## 2021-09-03 ENCOUNTER — PATIENT MESSAGE (OUTPATIENT)
Dept: NEUROLOGY | Facility: CLINIC | Age: 46
End: 2021-09-03

## 2021-09-09 ENCOUNTER — TELEPHONE (OUTPATIENT)
Dept: UROLOGY | Facility: CLINIC | Age: 46
End: 2021-09-09

## 2021-09-20 DIAGNOSIS — F41.9 ANXIETY: ICD-10-CM

## 2021-09-20 RX ORDER — ALPRAZOLAM 2 MG/1
2 TABLET ORAL NIGHTLY PRN
Qty: 30 TABLET | Refills: 1 | Status: SHIPPED | OUTPATIENT
Start: 2021-09-20 | End: 2021-11-15 | Stop reason: SDUPTHER

## 2021-11-15 DIAGNOSIS — F41.9 ANXIETY: ICD-10-CM

## 2021-11-15 RX ORDER — ALPRAZOLAM 2 MG/1
2 TABLET ORAL NIGHTLY PRN
Qty: 30 TABLET | Refills: 1 | Status: SHIPPED | OUTPATIENT
Start: 2021-11-15 | End: 2022-01-11 | Stop reason: SDUPTHER

## 2022-01-07 RX ORDER — ERENUMAB-AOOE 140 MG/ML
140 INJECTION, SOLUTION SUBCUTANEOUS
Qty: 1 ML | Refills: 11 | Status: SHIPPED | OUTPATIENT
Start: 2022-01-07 | End: 2023-01-13 | Stop reason: SDUPTHER

## 2022-01-11 DIAGNOSIS — F41.9 ANXIETY: ICD-10-CM

## 2022-01-11 RX ORDER — ALPRAZOLAM 2 MG/1
2 TABLET ORAL NIGHTLY PRN
Qty: 30 TABLET | Refills: 2 | Status: SHIPPED | OUTPATIENT
Start: 2022-01-11 | End: 2022-04-04 | Stop reason: SDUPTHER

## 2022-01-11 NOTE — TELEPHONE ENCOUNTER
----- Message from Azul Correa, Patient Care Assistant sent at 1/11/2022 10:54 AM CST -----  Regarding: medication refill  Contact: Pt  Pt is requesting a medication refill.      Medication: ALPRAZolam (XANAX) 2 MG Tab    Pharmacy: [facility] [place]  [place] [address]

## 2022-01-12 ENCOUNTER — TELEPHONE (OUTPATIENT)
Dept: PHARMACY | Facility: CLINIC | Age: 47
End: 2022-01-12
Payer: MEDICAID

## 2022-02-09 ENCOUNTER — RESEARCH ENCOUNTER (OUTPATIENT)
Dept: RESEARCH | Facility: HOSPITAL | Age: 47
End: 2022-02-09
Payer: MEDICAID

## 2022-02-14 NOTE — PROGRESS NOTES
[employer] PSR STUDY , IRB [id number]  PI: ARSLAN PRICE MD  SUBJECT [id number]  TODAY'S DATE : February 9, 2022    USED OFFICE VISIT WITH DR. PRICE ON AUGUST 24, 2021 FOR 12 MONTH FOLLOW UP VISIT    At this visit, the following was done:  1. Medical record was reviewed for device events. Complained of pain at generator site. Dr. Price turned device off momentarily and pain went away. Device was turned back on, interrogated and reprogrammed. The programing report was not uploaded because it was not available.  2. Hospitalizations greater than 24 hours - none.  3. MRI - none  4. PGII was not done.

## 2022-04-03 NOTE — DISCHARGE SUMMARY
Discharge instructions given. Pt verbalized understanding. piv removed. RN ambulated with pt safely to exit.    [facility]  Discharge Summary      Admit Date: 4/20/2017    Discharge Date and Time: 4/21/17    Attending Physician: Lino Anderson MD    Reason for Admission: Macromastia    Procedures Performed: Procedure(s) (LRB):  REDUCTION-MAMMOPLASTY-BILATERAL (Bilateral)    Hospital Course: Ms. Gonzales was admitted to preop and brought to OR for B breast reductions. She had successful surgery and debbie the procedure well. She was taken to recovery in stable condition. She was observed in extended recovery overnight due to significant N/V that resolved by POD1. She was then discharged home after meeting all discharge criteria.    Consults: none    Significant Diagnostic Studies: none    Final Diagnoses:    Principal Problem: Macromastia   Secondary Diagnoses: none    Discharged Condition: stable    Disposition: Home or Self Care    Activities: Patient should have regular diet, be up as desired and may shower and shampoo tomorrow.    Follow Up/Patient Instructions: f/u With Dr. Anderson in 1 week in Plastics clinic    Medications:  Reconciled Home Medications:   Discharge Medication List as of 4/21/2017 12:37 PM      CONTINUE these medications which have NOT CHANGED    Details   amitriptyline (ELAVIL) 50 MG tablet Take 1 tablet (50 mg total) by mouth every evening. At 6 p.m., Starting 6/13/2016, Until Tue 6/13/17, Normal      butalbital-acetaminophen-caffeine -40 mg (FIORICET, ESGIC) -40 mg per tablet Take 1-2 tablets by mouth every 6 (six) hours as needed for Headaches., Starting 5/6/2016, Until Discontinued, Normal      estradiol (ESTRACE) 1 MG tablet Take 1 mg by mouth once daily. , Starting 7/7/2015, Until Discontinued, Historical Med      hydrOXYzine HCl (ATARAX) 25 MG tablet Take 1 tablet (25 mg total) by mouth every evening., Starting 6/13/2016, Until Discontinued, Normal      methen-m.blue-s.phos-phsal-hyo (URELLE) 81-10.8-40.8 mg Tab Take 1 tablet by mouth 3 (three) times daily., Starting  6/13/2016, Until Discontinued, Normal      oxybutynin (DITROPAN) 5 MG Tab TAKE 1 TABLET BY MOUTH TWO TIMES DAILY, Normal      promethazine (PHENERGAN) 25 MG tablet Take 1 tablet (25 mg total) by mouth every 6 (six) hours as needed for Nausea., Starting 9/8/2016, Until Discontinued, Print      TAMSULOSIN HCL (FLOMAX ORAL) Take 0.4 mg by mouth once daily., Until Discontinued, Historical Med           No discharge procedures on file.  Follow-up Information     Follow up with Lino Anderson MD In 1 week.    Specialty:  Plastic Surgery    Contact information:    [address] [facility] PLASTIC/RECONSTRUCTIVE SURGERY  3RD FLOOR  [facility] [id number] [phone]

## 2022-04-04 DIAGNOSIS — F41.9 ANXIETY: ICD-10-CM

## 2022-04-04 RX ORDER — ALPRAZOLAM 2 MG/1
2 TABLET ORAL NIGHTLY PRN
Qty: 30 TABLET | Refills: 2 | Status: SHIPPED | OUTPATIENT
Start: 2022-04-04 | End: 2022-07-05 | Stop reason: SDUPTHER

## 2022-04-04 NOTE — TELEPHONE ENCOUNTER
----- Message from Sabina Mills LPN sent at 3/31/2022  4:15 PM CDT -----  Regarding: FW: medication  Contact: pt @ [phone]    ----- Message -----  From: Анна Osorio  Sent: 3/31/2022   4:03 PM CDT  To: Silvio ROMEO Staff  Subject: medication                                       Rx Refill/Request    Is this a Refill or New Rx: refill    Rx Name and Strength:ALPRAZolam (XANAX) 2 MG Tab    Preferred Pharmacy with phone number:  [facility] [place] [address] [place] [address]  [address]  [place] [address]  Phone: [phone] Fax: [phone]    Communication Preference: pt @ [phone]  Additional Information:

## 2022-04-08 DIAGNOSIS — K21.9 GASTROESOPHAGEAL REFLUX DISEASE: ICD-10-CM

## 2022-04-08 DIAGNOSIS — N30.10 IC (INTERSTITIAL CYSTITIS): ICD-10-CM

## 2022-04-08 RX ORDER — FAMOTIDINE 40 MG/1
40 TABLET, FILM COATED ORAL NIGHTLY PRN
Qty: 30 TABLET | Refills: 11 | Status: SHIPPED | OUTPATIENT
Start: 2022-04-08 | End: 2023-04-20 | Stop reason: SDUPTHER

## 2022-04-22 ENCOUNTER — TELEPHONE (OUTPATIENT)
Dept: NEUROLOGY | Facility: CLINIC | Age: 47
End: 2022-04-22

## 2022-04-22 NOTE — TELEPHONE ENCOUNTER
----- Message from Tremontana Chevalier sent at 4/22/2022 12:46 PM CDT -----  Regarding: Appt  Contact: pt @ [phone]  Pt calling to reschedule follow up visit for headaches, and recent memory loss issues  No avail aptps in epic. Please call.

## 2022-04-25 ENCOUNTER — PATIENT MESSAGE (OUTPATIENT)
Dept: NEUROLOGY | Facility: CLINIC | Age: 47
End: 2022-04-25
Payer: MEDICAID

## 2022-05-11 ENCOUNTER — TELEPHONE (OUTPATIENT)
Dept: UROLOGY | Facility: CLINIC | Age: 47
End: 2022-05-11
Payer: MEDICAID

## 2022-05-11 NOTE — TELEPHONE ENCOUNTER
----- Message from Luann Oliveira sent at 5/11/2022  3:28 PM CDT -----  Regarding: speak with nurse  Contact: patient  [phone]     please call patient need to schedule appointment waiting on a call back from the nurse thanks.

## 2022-05-16 ENCOUNTER — TELEPHONE (OUTPATIENT)
Dept: NEUROLOGY | Facility: CLINIC | Age: 47
End: 2022-05-16
Payer: MEDICAID

## 2022-05-16 NOTE — TELEPHONE ENCOUNTER
Returned call to pt. Instructed Dr. Rahman is no longer seeing new memory dx pts. Instructed need referral to be seen by memory doctors.

## 2022-05-16 NOTE — TELEPHONE ENCOUNTER
----- Message from Lynn Arenas MA sent at 5/13/2022  4:47 PM CDT -----  Contact: JUAREZ HAND [[id number]] [phone]    ----- Message -----  From: Opal Dennison  Sent: 5/13/2022   4:41 PM CDT  To: Gus JORDAN Staff    Type: Appointment Request    Name of Caller: JUAREZ HAND [[id number]]  When is the first available appointment? Do not have access  Reason for Visit: Memory loss  Best Call Back Number: [phone]  Additional Information: Patient would prefer to see Dr. Rahman for this appointment, but Epic decision tree did not show Dr. Rahman as an option. Please advise patient on whether she can be seen by Dr. Rahman, or if there is another provider at [facility] that is recommended.

## 2022-07-05 DIAGNOSIS — F41.9 ANXIETY: ICD-10-CM

## 2022-07-05 NOTE — TELEPHONE ENCOUNTER
----- Message from Marlene William sent at 7/5/2022  2:02 PM CDT -----  Contact: @[phone]  Pt requesting a call back to refill medication (ALPRAZolam (XANAX) 2 MG Tab).  Please call to discuss further.      [facility] [place] [facility] [place] [address] [place] [address]  [address]  [place] [address]  Phone: [phone] Fax: [phone]

## 2022-07-07 RX ORDER — ALPRAZOLAM 2 MG/1
2 TABLET ORAL NIGHTLY PRN
Qty: 30 TABLET | Refills: 2 | Status: SHIPPED | OUTPATIENT
Start: 2022-07-07 | End: 2022-10-04 | Stop reason: SDUPTHER

## 2022-08-23 ENCOUNTER — OFFICE VISIT (OUTPATIENT)
Dept: UROLOGY | Facility: CLINIC | Age: 47
End: 2022-08-23
Payer: MEDICAID

## 2022-08-23 VITALS
HEART RATE: 87 BPM | HEIGHT: 67 IN | DIASTOLIC BLOOD PRESSURE: 80 MMHG | SYSTOLIC BLOOD PRESSURE: 117 MMHG | BODY MASS INDEX: 31.49 KG/M2 | WEIGHT: 200.63 LBS

## 2022-08-23 DIAGNOSIS — N39.0 RECURRENT UTI: ICD-10-CM

## 2022-08-23 DIAGNOSIS — R33.9 URINARY RETENTION WITH INCOMPLETE BLADDER EMPTYING: Primary | ICD-10-CM

## 2022-08-23 PROCEDURE — 95971 ALYS SMPL SP/PN NPGT W/PRGRM: CPT | Mod: S$PBB,,, | Performed by: UROLOGY

## 2022-08-23 PROCEDURE — 99999 PR PBB SHADOW E&M-EST. PATIENT-LVL III: CPT | Mod: PBBFAC,,, | Performed by: UROLOGY

## 2022-08-23 PROCEDURE — 3008F BODY MASS INDEX DOCD: CPT | Mod: CPTII,,, | Performed by: UROLOGY

## 2022-08-23 PROCEDURE — 3079F DIAST BP 80-89 MM HG: CPT | Mod: CPTII,,, | Performed by: UROLOGY

## 2022-08-23 PROCEDURE — 99213 OFFICE O/P EST LOW 20 MIN: CPT | Mod: PBBFAC,25 | Performed by: UROLOGY

## 2022-08-23 PROCEDURE — 95971 ALYS SMPL SP/PN NPGT W/PRGRM: CPT | Mod: PBBFAC | Performed by: UROLOGY

## 2022-08-23 PROCEDURE — 1159F PR MEDICATION LIST DOCUMENTED IN MEDICAL RECORD: ICD-10-PCS | Mod: CPTII,,, | Performed by: UROLOGY

## 2022-08-23 PROCEDURE — 3079F PR MOST RECENT DIASTOLIC BLOOD PRESSURE 80-89 MM HG: ICD-10-PCS | Mod: CPTII,,, | Performed by: UROLOGY

## 2022-08-23 PROCEDURE — 99213 OFFICE O/P EST LOW 20 MIN: CPT | Mod: 25,S$PBB,, | Performed by: UROLOGY

## 2022-08-23 PROCEDURE — 99999 PR PBB SHADOW E&M-EST. PATIENT-LVL III: ICD-10-PCS | Mod: PBBFAC,,, | Performed by: UROLOGY

## 2022-08-23 PROCEDURE — 87086 URINE CULTURE/COLONY COUNT: CPT | Performed by: UROLOGY

## 2022-08-23 PROCEDURE — 3074F SYST BP LT 130 MM HG: CPT | Mod: CPTII,,, | Performed by: UROLOGY

## 2022-08-23 PROCEDURE — 1159F MED LIST DOCD IN RCRD: CPT | Mod: CPTII,,, | Performed by: UROLOGY

## 2022-08-23 PROCEDURE — 3074F PR MOST RECENT SYSTOLIC BLOOD PRESSURE < 130 MM HG: ICD-10-PCS | Mod: CPTII,,, | Performed by: UROLOGY

## 2022-08-23 PROCEDURE — 3008F PR BODY MASS INDEX (BMI) DOCUMENTED: ICD-10-PCS | Mod: CPTII,,, | Performed by: UROLOGY

## 2022-08-23 PROCEDURE — 95971 PR ANALYZE NEUROSTIM,SIMPLE/PROG: ICD-10-PCS | Mod: S$PBB,,, | Performed by: UROLOGY

## 2022-08-23 PROCEDURE — 99213 PR OFFICE/OUTPT VISIT, EST, LEVL III, 20-29 MIN: ICD-10-PCS | Mod: 25,S$PBB,, | Performed by: UROLOGY

## 2022-08-23 NOTE — PROGRESS NOTES
CC: InterStim check    HPI:     Hx of recurrent UTI, hx of incomplete bladder emptying, IC, chronic constipation, s/p InterStim Therapy    Elana Gonzales is a 46 y.o. female with incomplete bladder emptying, interstitial cystitis, and recurrent UTIs. She has tried and failed multiple medical therapies.   C/o incomplete bladder emptying, chronic bladder pain and chronic constipation.  She saw Dr. Kinsey in GI in the past, and has taken Miralax.  I also recommended Probiotics.    Elana Gonzales is a 45 y.o. female with incomplete bladder emptying, interstitial cystitis, and recurrent UTIs. She has tried and failed multiple medical therapies. She underwent Stage 1 Interstim on 7/22 with an excellent response.   Procedure(s) Performed: 8/5/20  1.  Insertion of peripheral neurostimulator pulse generator (06850)  2.  Electronic analysis of implanted neurostimulator pulse generator system with intraoperative or subsequent programming (76488)  Findings:  Lead placed on right side  Generator placed on right side  Good sensory and motor function c/w S3 stimulation seen    So far she has done really well.  No recurrent UTI since InterStim Therapy.  Has been on IC meds and Xanax 2 mg q hs for her anxiety, which I have tried to wean but she could not tolerate it.  C/o wt gain.  Otherwise she is doing really well in regards to her bladder symptoms.      Physical Exam  HENT:      Head: Normocephalic and atraumatic.   Eyes:      Conjunctiva/sclera: Conjunctivae normal.      Pupils: Pupils are equal, round, and reactive to light.   Cardiovascular:      Rate and Rhythm: Normal rate.      Heart sounds: Normal heart sounds.   Pulmonary:      Effort: Pulmonary effort is normal.      Breath sounds: Normal breath sounds.   Abdominal:      General: Bowel sounds are normal.      Palpations: Abdomen is soft.   Genitourinary:     Comments: InterStim wound well healed.  Musculoskeletal:         General: Normal range of motion.      Cervical  back: Normal range of motion and neck supple.   Skin:     General: Skin is warm and dry.   Neurological:      Mental Status: She is alert and oriented to person, place, and time.      Gait: Gait is intact.   Psychiatric:         Mood and Affect: Mood and affect normal.         Cognition and Memory: Memory normal.         Judgment: Judgment normal.       KUB 11/2020  InterStim generator and its electrode located on the right side.    InterStim Neurostimulator Reprogrammin Procedure:    Using the , different setting were tried until patient felt the appropriate responses.  Originally she had a setting #3 at 1.6 volts.  I think it was too high.  Turned off the device and all pain disappeared.  Impedence check was normal.  Battery life is 90 months.  Reprogram done.  Setting #2 at 0.7 volts resulted in comfortable stimulation in the right perineal area.  Patient experienced the better stimulation as a result of reprogramming and verbalized the familiarity of using an own controller.      UA trace leuko trace blood today.    Assessment and plan:     Urinary retention with incomplete bladder emptying  -     Urine culture    Recurrent UTI  -     Cancel: Urine culture; Future; Expected date: 08/23/2022      stop Elavil which may affect her wt gain.  Otherwise, continue IC meds and IC diet.  Pt prefers to continue oxybutynin xl which has improved her bladder control symptoms.  Continue Flomax daily.  I encouraged her to wean Xanax to 2 mg q hs prn.    S/p InterStim Theray ( both generator and its electrode are located on the right side).  Sometime pt does not feel the stimulation.    I reprogrammed her setting today..  Came in with Program 2 at 0.8 amp. Finally feeling the stimulation at 1.2 amp.  Check program 1, 2 and 3, all resulted in rectal stimulation.  Program 4 resulted in vaginal stimulation at less than 0.5 map.  So I left her new setting at program 4 at 0.4 map.  Reprogram of InStim therapy  done.    I spent 25 minutes with the patient of which more than half was spent in direct consultation with the patient in regards to our treatment and plan.        Follow up in about 1 year (around 8/23/2023).

## 2022-08-24 LAB
BACTERIA UR CULT: NORMAL
BACTERIA UR CULT: NORMAL

## 2022-10-04 DIAGNOSIS — F41.9 ANXIETY: ICD-10-CM

## 2022-10-04 RX ORDER — ALPRAZOLAM 2 MG/1
2 TABLET ORAL NIGHTLY PRN
Qty: 30 TABLET | Refills: 2 | Status: SHIPPED | OUTPATIENT
Start: 2022-10-04 | End: 2022-12-27 | Stop reason: SDUPTHER

## 2022-11-09 ENCOUNTER — TELEPHONE (OUTPATIENT)
Dept: GASTROENTEROLOGY | Facility: CLINIC | Age: 47
End: 2022-11-09
Payer: MEDICAID

## 2022-11-09 NOTE — TELEPHONE ENCOUNTER
Spoke to pt regarding message below and scheduled pt on 11/30 at 2:30 pm to be seen by Dr. Main. Pt verbalize understand, confirmed appt and thank me    ----- Message from Hemalatha Barros MA sent at 11/2/2022  4:23 PM CDT -----  Contact: [phone]    ----- Message -----  From: Josh Jo MA  Sent: 11/2/2022   3:14 PM CDT  To: Frank Correa MA, Jose [facility] Smith Staff    Patient is calling to schedule appt with Dr. Main. Pt states something is wrong with her stomach and throat.  Pt access tried but no appts are available.  the patient can be reached at [phone].

## 2022-11-29 NOTE — PROGRESS NOTES
[facility] Consultation Note    Reason for Consult:  The primary encounter diagnosis was Gastroesophageal reflux disease without esophagitis. A diagnosis of Other constipation was also pertinent to this visit.    PCP:   Nick Reeves Jr       Referring MD:  No referring provider defined for this encounter.    Initial HPI:  This is a 47 y.o. female here to follow-up on her abdominal pain  At her last visit she was treated for 2nd time for H pylori  Given Flagyl, tetracycline, bismuth, ppi  Initially treated with clarithromycin, amoxicillin, and omeprazole    Interval history 2022:        ROS:  Constitutional: No fevers, chills, No weight loss  ENT: No allergies  CV: No chest pain  Pulm: No cough, No shortness of breath  Ophtho: No vision changes  GI: see HPI  Derm: No rash  Heme: No lymphadenopathy, No bruising  MSK: + crush injury on foot treated with percocets takes it rarely ()  : + interstitial cystitis, + BV  Endo: No hot or cold intolerance  Neuro: + migraines  Psych: No anxiety, No depression    Medical History:  has a past medical history of Anxiety, Dizziness, DVT (deep venous thrombosis) (2019), GERD (gastroesophageal reflux disease), H. pylori infection, Interstitial cystitis, Migraine headache, and PONV (postoperative nausea and vomiting).    Surgical History:  has a past surgical history that includes Hysterectomy;  section, classic; Oophorectomy; Colonoscopy (N/A, 2016); Tonsillectomy; Injection of botulinum toxin type A (N/A, 2018); Instillation of urinary bladder (N/A, 2018); Esophagogastroduodenoscopy (N/A, 3/4/2019); Breast surgery; Cystoscopy with hydrodistension of bladder (N/A, 3/13/2019); Instillation of urinary bladder (N/A, 3/13/2019); Injection of botulinum toxin type A (N/A, 3/13/2019); Upper gastrointestinal endoscopy; Cystoscopy with hydrodistension of bladder (N/A, 2020); Instillation of urinary bladder (N/A,  5/27/2020); Cystoscopy with hydrodistension of bladder (N/A, 6/10/2020); Instillation of urinary bladder (6/10/2020); Fluoroscopy (7/22/2020); Implantation of permanent sacral nerve stimulator (N/A, 8/5/2020); Esophagogastroduodenoscopy (N/A, 1/14/2021); and Colonoscopy (N/A, 1/14/2021).      Review of patient's allergies indicates:   Allergen Reactions    Bactrim [sulfamethoxazole-trimethoprim] Other (See Comments)     headache    Codeine Other (See Comments)     Other reaction(s): Vomiting       Medication List with Changes/Refills   New Medications    LACTULOSE (CHRONULAC) 20 GRAM/30 ML SOLN    Take 15 mLs (10 g total) by mouth 2 (two) times daily.    PANTOPRAZOLE (PROTONIX) 40 MG TABLET    Take 1 tablet (40 mg total) by mouth once daily.   Current Medications    ALPRAZOLAM (XANAX) 2 MG TAB    Take 1 tablet (2 mg total) by mouth nightly as needed.    AMITRIPTYLINE (ELAVIL) 25 MG TABLET    TAKE 1 TABLET BY MOUTH EVERY EVENING    BUTALBITAL-ACETAMINOPHEN-CAFFEINE -40 MG (FIORICET, ESGIC) -40 MG PER TABLET    Take 1-2 tablets by mouth every 6 (six) hours as needed for Headaches.    ERENUMAB-AOOE (AIMOVIG AUTOINJECTOR) 140 MG/ML AUTOINJECTOR    Inject 1 mL (140 mg total) into the skin every 28 days.    FAMOTIDINE (PEPCID) 40 MG TABLET    Take 1 tablet (40 mg total) by mouth nightly as needed (bladder pain).    HYDROXYZINE HCL (ATARAX) 25 MG TABLET    TAKE 1 TABLET BY MOUTH EVERY EVENING    MECLIZINE (ANTIVERT) 25 MG TABLET    TAKE 1 TABLET BY MOUTH TWO TIMES DAILY FOR DIZZINESS    OXYBUTYNIN (DITROPAN-XL) 10 MG 24 HR TABLET    TAKE 1 TABLET BY MOUTH ONCE DAILY    OXYCODONE-ACETAMINOPHEN (PERCOCET)  MG PER TABLET    Take 1 tablet by mouth every 12 (twelve) hours as needed.    PHENAZOPYRIDINE (PYRIDIUM) 200 MG TABLET    TAKE 1 TABLET BY MOUTH THREE TIMES DAILY AS NEEDED FOR PAIN    PROMETHAZINE (PHENERGAN) 50 MG TABLET    Take 50 mg by mouth every 12 (twelve) hours as needed.    RIZATRIPTAN (MAXALT)  "10 MG TABLET    Take 1/2 to 1 tab at onset of headache or dizziness.  If no improvement in 2 hours, take another.  Do not take more than 2 in 24 hours.    TAMSULOSIN (FLOMAX) 0.4 MG CAP    TAKE 1 CAPSULE BY MOUTH ONCE DAILY    TAMSULOSIN (FLOMAX) 0.4 MG CAP    Take 1 capsule by mouth.         Objective Findings:    Vital Signs:  /74   Pulse 88   Ht 5' 3" (1.6 m)   Wt 95.3 kg (210 lb)   LMP 08/26/2008   BMI 37.20 kg/m²   Body mass index is 37.2 kg/m².    Physical Exam:  General Appearance: Well appearing in no acute distress  Head:   Normocephalic, without obvious abnormality  Eyes:    No scleral icteru  ENT: Neck supple, Lips, mucosa, and tongue normal; teeth and gums normal  Lungs: CTA bilaterally in anterior and posterior fields, no wheezes, no crackles.  Heart:  Regular rate and rhythm, S1, S2 normal, no murmurs heard  Abdomen: Soft, epigastric tenderness, no guarding or rebound tenderness non distended with positive bowel sounds in all four quadrants.   Extremities: noedema  Skin: No rash  Neurologic: AAO x 3      Labs:  Lab Results   Component Value Date    WBC 7.33 08/02/2021    HGB 13.1 08/02/2021    HCT 39.6 08/02/2021     08/02/2021    CHOL 211 (H) 08/03/2007    TRIG 72 08/03/2007    HDL 94 (H) 08/03/2007    ALT 25 08/02/2021    AST 22 08/02/2021     08/02/2021    K 4.4 08/02/2021     08/02/2021    CREATININE 0.8 08/02/2021    BUN 12 08/02/2021    CO2 26 08/02/2021    TSH 1.5 08/03/2007    INR 0.9 09/08/2016       H pylori: Results for JUAREZ HAND (MRN [id number]) as of 12/2/2019 08:55   Ref. Range 6/5/2019 07:55 9/19/2019 13:26 11/15/2019 10:33   H. Pylori Antigen, Stool Latest Ref Range: Not detected  Detected (A) Not detected Not detected       Imaging:  CT 2016 - wnl  U/s 7/19 - wnl  CT 2020 - Edematous changes in the wall of the ascending and transverse colon.  Correlate for inflammatory or infectious colitis.  Normal enhancement is present with no evidence of ischemic " cause.    Endoscopy:    03/04/2019 EGD- Gastric inlet patch seen in upper esophagus                        - A single lesion suspicious for aberrant pancreas                         was found in the stomach. Biopsied. Treated with                         bipolar cautery for post biopsy bleeding.                        - Normal examined duodenum. Biopsied.                        - Biopsies were taken with a cold forceps for                         Helicobacter pylori testing.  -biopsies were positive for H pylori, +intestinal metaplasia    Colon 2016 - wnl    Assessment:  1. Gastroesophageal reflux disease without esophagitis    2. Other constipation              Recommendations:  1.  PPI for worse GERD (normal EGD)  2.  Abdominal ultrasound to rule out gallstones - normal  3.  Will resume her elavil at lower dose of 25 mg as her GI symptoms all got worse off of it and she likely has IBS with her IC and migraine history  4. Lactulose for constipation, amitiza/linzess if fails  5. If still bloated after these issue consider breath testing for SIBO    Follow up in about 3 months (around 2/28/2023).      Order summary:  Orders Placed This Encounter    pantoprazole (PROTONIX) 40 MG tablet    lactulose (CHRONULAC) 20 gram/30 mL Soln           Thank you so much for allowing me to participate in the care of Elana Main MD

## 2022-11-30 ENCOUNTER — OFFICE VISIT (OUTPATIENT)
Dept: GASTROENTEROLOGY | Facility: CLINIC | Age: 47
End: 2022-11-30
Payer: MEDICAID

## 2022-11-30 VITALS
BODY MASS INDEX: 37.21 KG/M2 | HEART RATE: 88 BPM | HEIGHT: 63 IN | WEIGHT: 210 LBS | SYSTOLIC BLOOD PRESSURE: 114 MMHG | DIASTOLIC BLOOD PRESSURE: 74 MMHG

## 2022-11-30 DIAGNOSIS — K59.09 OTHER CONSTIPATION: ICD-10-CM

## 2022-11-30 DIAGNOSIS — K21.9 GASTROESOPHAGEAL REFLUX DISEASE WITHOUT ESOPHAGITIS: Primary | ICD-10-CM

## 2022-11-30 PROCEDURE — 99213 OFFICE O/P EST LOW 20 MIN: CPT | Mod: PBBFAC | Performed by: INTERNAL MEDICINE

## 2022-11-30 PROCEDURE — 1159F PR MEDICATION LIST DOCUMENTED IN MEDICAL RECORD: ICD-10-PCS | Mod: CPTII,,, | Performed by: INTERNAL MEDICINE

## 2022-11-30 PROCEDURE — 3008F BODY MASS INDEX DOCD: CPT | Mod: CPTII,,, | Performed by: INTERNAL MEDICINE

## 2022-11-30 PROCEDURE — 3008F PR BODY MASS INDEX (BMI) DOCUMENTED: ICD-10-PCS | Mod: CPTII,,, | Performed by: INTERNAL MEDICINE

## 2022-11-30 PROCEDURE — 99214 PR OFFICE/OUTPT VISIT, EST, LEVL IV, 30-39 MIN: ICD-10-PCS | Mod: S$PBB,,, | Performed by: INTERNAL MEDICINE

## 2022-11-30 PROCEDURE — 99214 OFFICE O/P EST MOD 30 MIN: CPT | Mod: S$PBB,,, | Performed by: INTERNAL MEDICINE

## 2022-11-30 PROCEDURE — 3078F DIAST BP <80 MM HG: CPT | Mod: CPTII,,, | Performed by: INTERNAL MEDICINE

## 2022-11-30 PROCEDURE — 3074F PR MOST RECENT SYSTOLIC BLOOD PRESSURE < 130 MM HG: ICD-10-PCS | Mod: CPTII,,, | Performed by: INTERNAL MEDICINE

## 2022-11-30 PROCEDURE — 3078F PR MOST RECENT DIASTOLIC BLOOD PRESSURE < 80 MM HG: ICD-10-PCS | Mod: CPTII,,, | Performed by: INTERNAL MEDICINE

## 2022-11-30 PROCEDURE — 1159F MED LIST DOCD IN RCRD: CPT | Mod: CPTII,,, | Performed by: INTERNAL MEDICINE

## 2022-11-30 PROCEDURE — 99999 PR PBB SHADOW E&M-EST. PATIENT-LVL III: ICD-10-PCS | Mod: PBBFAC,,, | Performed by: INTERNAL MEDICINE

## 2022-11-30 PROCEDURE — 3074F SYST BP LT 130 MM HG: CPT | Mod: CPTII,,, | Performed by: INTERNAL MEDICINE

## 2022-11-30 PROCEDURE — 99999 PR PBB SHADOW E&M-EST. PATIENT-LVL III: CPT | Mod: PBBFAC,,, | Performed by: INTERNAL MEDICINE

## 2022-11-30 RX ORDER — TAMSULOSIN HYDROCHLORIDE 0.4 MG/1
1 CAPSULE ORAL
COMMUNITY
Start: 2022-03-22 | End: 2023-08-29

## 2022-11-30 RX ORDER — PANTOPRAZOLE SODIUM 40 MG/1
40 TABLET, DELAYED RELEASE ORAL DAILY
Qty: 30 TABLET | Refills: 3 | Status: SHIPPED | OUTPATIENT
Start: 2022-11-30 | End: 2023-03-03 | Stop reason: SDUPTHER

## 2022-11-30 RX ORDER — LACTULOSE 10 G/15ML
10 SOLUTION ORAL 2 TIMES DAILY
Qty: 900 ML | Refills: 3 | Status: SHIPPED | OUTPATIENT
Start: 2022-11-30 | End: 2022-12-30

## 2022-12-27 DIAGNOSIS — F41.9 ANXIETY: ICD-10-CM

## 2022-12-27 NOTE — TELEPHONE ENCOUNTER
----- Message from Yanet Dodd sent at 12/27/2022 10:02 AM CST -----  Contact: @[phone]  Pt is calling is calling in to get  a refill for prescription (ALPRAZolam (XANAX) 2 MG Tab), please call to discuss further.    [facility] [place] [facility] [place] [facility] [place] [address]  [address]  [place] [address]  Phone: [phone] Fax: [phone]

## 2023-01-03 RX ORDER — ALPRAZOLAM 2 MG/1
2 TABLET ORAL NIGHTLY PRN
Qty: 30 TABLET | Refills: 2 | Status: SHIPPED | OUTPATIENT
Start: 2023-01-03 | End: 2023-03-03 | Stop reason: SDUPTHER

## 2023-01-13 RX ORDER — ERENUMAB-AOOE 140 MG/ML
140 INJECTION, SOLUTION SUBCUTANEOUS
Qty: 1 ML | Refills: 2 | Status: SHIPPED | OUTPATIENT
Start: 2023-01-13 | End: 2023-02-16

## 2023-01-17 ENCOUNTER — TELEPHONE (OUTPATIENT)
Dept: PHARMACY | Facility: CLINIC | Age: 48
End: 2023-01-17
Payer: MEDICAID

## 2023-01-19 ENCOUNTER — TELEPHONE (OUTPATIENT)
Dept: NEUROLOGY | Facility: CLINIC | Age: 48
End: 2023-01-19
Payer: MEDICAID

## 2023-01-19 DIAGNOSIS — G43.011 INTRACTABLE MIGRAINE WITHOUT AURA AND WITH STATUS MIGRAINOSUS: Primary | ICD-10-CM

## 2023-01-19 NOTE — TELEPHONE ENCOUNTER
Insurance requiring change to Emgality, I sent through [facility] pharmacy.  Needs follow up, can add on.

## 2023-01-27 ENCOUNTER — TELEPHONE (OUTPATIENT)
Dept: NEUROLOGY | Facility: CLINIC | Age: 48
End: 2023-01-27
Payer: MEDICAID

## 2023-02-09 ENCOUNTER — TELEPHONE (OUTPATIENT)
Dept: PHARMACY | Facility: CLINIC | Age: 48
End: 2023-02-09
Payer: MEDICAID

## 2023-02-14 ENCOUNTER — TELEPHONE (OUTPATIENT)
Dept: NEUROLOGY | Facility: CLINIC | Age: 48
End: 2023-02-14
Payer: MEDICAID

## 2023-02-14 NOTE — TELEPHONE ENCOUNTER
Called pt to schedule headache follow-up appointment. Pt stated she is out of Aimovig and desperately needs refill. Pt agreed to come 2/16 at 10:20 AM.

## 2023-02-16 ENCOUNTER — OFFICE VISIT (OUTPATIENT)
Dept: NEUROLOGY | Facility: CLINIC | Age: 48
End: 2023-02-16
Payer: MEDICAID

## 2023-02-16 ENCOUNTER — TELEPHONE (OUTPATIENT)
Dept: INTERNAL MEDICINE | Facility: CLINIC | Age: 48
End: 2023-02-16

## 2023-02-16 DIAGNOSIS — G43.011 INTRACTABLE MIGRAINE WITHOUT AURA AND WITH STATUS MIGRAINOSUS: Primary | ICD-10-CM

## 2023-02-16 DIAGNOSIS — Z00.00 WELLNESS EXAMINATION: ICD-10-CM

## 2023-02-16 PROCEDURE — 1159F PR MEDICATION LIST DOCUMENTED IN MEDICAL RECORD: ICD-10-PCS | Mod: CPTII,95,, | Performed by: PSYCHIATRY & NEUROLOGY

## 2023-02-16 PROCEDURE — 99214 PR OFFICE/OUTPT VISIT, EST, LEVL IV, 30-39 MIN: ICD-10-PCS | Mod: 95,,, | Performed by: PSYCHIATRY & NEUROLOGY

## 2023-02-16 PROCEDURE — 99214 OFFICE O/P EST MOD 30 MIN: CPT | Mod: 95,,, | Performed by: PSYCHIATRY & NEUROLOGY

## 2023-02-16 PROCEDURE — 1159F MED LIST DOCD IN RCRD: CPT | Mod: CPTII,95,, | Performed by: PSYCHIATRY & NEUROLOGY

## 2023-02-16 PROCEDURE — 1160F RVW MEDS BY RX/DR IN RCRD: CPT | Mod: CPTII,95,, | Performed by: PSYCHIATRY & NEUROLOGY

## 2023-02-16 PROCEDURE — 1160F PR REVIEW ALL MEDS BY PRESCRIBER/CLIN PHARMACIST DOCUMENTED: ICD-10-PCS | Mod: CPTII,95,, | Performed by: PSYCHIATRY & NEUROLOGY
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
## 2023-02-16 NOTE — TELEPHONE ENCOUNTER
----- Message from Jesus Rahman MD sent at 2/16/2023 11:11 AM CST -----  Hi,  Is Dr. Benjamin accepting new patients and would Dr. Benjamin be able to see her to establish primary care if so?  Thanks!  CV

## 2023-03-03 DIAGNOSIS — N32.81 OAB (OVERACTIVE BLADDER): ICD-10-CM

## 2023-03-03 DIAGNOSIS — R39.16 STRAINS TO URINATE: ICD-10-CM

## 2023-03-03 DIAGNOSIS — F41.9 ANXIETY: ICD-10-CM

## 2023-03-03 RX ORDER — TAMSULOSIN HYDROCHLORIDE 0.4 MG/1
1 CAPSULE ORAL DAILY
Qty: 30 CAPSULE | Refills: 11 | Status: SHIPPED | OUTPATIENT
Start: 2023-03-03 | End: 2023-08-29

## 2023-03-03 RX ORDER — ALPRAZOLAM 2 MG/1
2 TABLET ORAL NIGHTLY PRN
Qty: 30 TABLET | Refills: 2 | Status: SHIPPED | OUTPATIENT
Start: 2023-03-03 | End: 2023-03-16 | Stop reason: SDUPTHER

## 2023-03-03 RX ORDER — OXYBUTYNIN CHLORIDE 10 MG/1
10 TABLET, EXTENDED RELEASE ORAL DAILY
Qty: 30 TABLET | Refills: 11 | Status: SHIPPED | OUTPATIENT
Start: 2023-03-03 | End: 2023-08-29 | Stop reason: SDUPTHER

## 2023-03-03 RX ORDER — PANTOPRAZOLE SODIUM 40 MG/1
40 TABLET, DELAYED RELEASE ORAL DAILY
Qty: 30 TABLET | Refills: 3 | Status: SHIPPED | OUTPATIENT
Start: 2023-03-03 | End: 2023-07-24 | Stop reason: SDUPTHER

## 2023-03-03 RX ORDER — HYDROXYZINE HYDROCHLORIDE 25 MG/1
25 TABLET, FILM COATED ORAL NIGHTLY
Qty: 30 TABLET | Refills: 11 | Status: SHIPPED | OUTPATIENT
Start: 2023-03-03 | End: 2023-05-06

## 2023-03-03 RX ORDER — AMITRIPTYLINE HYDROCHLORIDE 25 MG/1
25 TABLET, FILM COATED ORAL NIGHTLY
Qty: 30 TABLET | Refills: 11 | Status: SHIPPED | OUTPATIENT
Start: 2023-03-03 | End: 2023-08-29

## 2023-03-03 NOTE — TELEPHONE ENCOUNTER
----- Message from Mali Chacon sent at 3/3/2023 11:03 AM CST -----  Regarding: Refill  Contact: [phone]  Pt is requesting a refill for pantoprazole (PROTONIX) 40 MG tablet.      [facility] [place] [facility] [place] [address]  [place] [address]  Phone: [phone] Fax: [phone]

## 2023-03-03 NOTE — TELEPHONE ENCOUNTER
----- Message from Mali Chacon sent at 3/3/2023 11:05 AM CST -----  Regarding: Refill  Contact: [phone]  Pt is requesting for a refill for tamsulosin (FLOMAX) 0.4 mg Cap, amitriptyline (ELAVIL) 25 MG tablet, hydrOXYzine HCL (ATARAX) 25 MG tablet and oxybutynin (DITROPAN-XL) 10 MG 24 hr tablet      [facility]  [address] [place]  [phone]

## 2023-03-03 NOTE — TELEPHONE ENCOUNTER
Mali ROMEO Staff  Caller: [phone] (Today, 11:10 AM)  Pt is requesting a refill for ALPRAZolam (XANAX) 2 MG Tab       [facility] [place] - [address] [place] [address]   [address]   [place] [address]   Phone: [phone] Fax: [phone]

## 2023-03-07 ENCOUNTER — TELEPHONE (OUTPATIENT)
Dept: UROLOGY | Facility: CLINIC | Age: 48
End: 2023-03-07
Payer: MEDICAID

## 2023-03-07 NOTE — TELEPHONE ENCOUNTER
----- Message from Vandana Fisher sent at 3/7/2023 10:16 AM CST -----  Regarding: pt advice  Contact: self @[phone]  Pt requesting a call back from nurse. Pls call

## 2023-03-08 ENCOUNTER — OUTSIDE PLACE OF SERVICE (OUTPATIENT)
Dept: CARDIOLOGY | Facility: CLINIC | Age: 48
End: 2023-03-08
Payer: MEDICAID

## 2023-03-08 PROCEDURE — 93010 ELECTROCARDIOGRAM REPORT: CPT | Mod: ,,, | Performed by: INTERNAL MEDICINE

## 2023-03-08 PROCEDURE — 93010 PR ELECTROCARDIOGRAM REPORT: ICD-10-PCS | Mod: ,,, | Performed by: INTERNAL MEDICINE

## 2023-03-14 ENCOUNTER — RESEARCH ENCOUNTER (OUTPATIENT)
Dept: RESEARCH | Facility: HOSPITAL | Age: 48
End: 2023-03-14
Payer: MEDICAID

## 2023-03-14 NOTE — PROGRESS NOTES
Medtr[employer] PSR study   IRB number: [id number]  PI: Dr. Yong Anguiano  Patient number: [id number]     24 months follow up:     Patient's in clinic visit with Dr Anguiano, [facility], charly andersen on 23Aug22 was used for visit   Following items were also reviewed:   1) Patient's medical records  2) Hospital admissions greater than 24 hours since previous visit- none  3) MRI- no  4)Device interrogation- not routine/ not done   5) PGI-I- not done- visit completed outside window so no questionnaires done

## 2023-03-16 ENCOUNTER — TELEPHONE (OUTPATIENT)
Dept: GASTROENTEROLOGY | Facility: CLINIC | Age: 48
End: 2023-03-16
Payer: MEDICAID

## 2023-03-16 ENCOUNTER — OFFICE VISIT (OUTPATIENT)
Dept: UROLOGY | Facility: CLINIC | Age: 48
End: 2023-03-16
Payer: MEDICAID

## 2023-03-16 DIAGNOSIS — F41.9 ANXIETY: ICD-10-CM

## 2023-03-16 DIAGNOSIS — Z96.82 PRESENCE OF NEUROSTIMULATOR: ICD-10-CM

## 2023-03-16 DIAGNOSIS — R33.9 INCOMPLETE BLADDER EMPTYING: ICD-10-CM

## 2023-03-16 DIAGNOSIS — N30.10 INTERSTITIAL CYSTITIS: ICD-10-CM

## 2023-03-16 DIAGNOSIS — N39.0 RECURRENT UTI: Primary | ICD-10-CM

## 2023-03-16 DIAGNOSIS — K59.04 CHRONIC IDIOPATHIC CONSTIPATION: Primary | ICD-10-CM

## 2023-03-16 PROCEDURE — 99999 PR PBB SHADOW E&M-EST. PATIENT-LVL II: CPT | Mod: PBBFAC,,, | Performed by: UROLOGY

## 2023-03-16 PROCEDURE — 87077 CULTURE AEROBIC IDENTIFY: CPT | Performed by: UROLOGY

## 2023-03-16 PROCEDURE — 99212 OFFICE O/P EST SF 10 MIN: CPT | Mod: PBBFAC | Performed by: UROLOGY

## 2023-03-16 PROCEDURE — 87086 URINE CULTURE/COLONY COUNT: CPT | Performed by: UROLOGY

## 2023-03-16 PROCEDURE — 95970 ALYS NPGT W/O PRGRMG: CPT | Mod: PBBFAC | Performed by: UROLOGY

## 2023-03-16 PROCEDURE — 95970 ALYS NPGT W/O PRGRMG: CPT | Mod: S$PBB,59,, | Performed by: UROLOGY

## 2023-03-16 PROCEDURE — 95970 PR ANALYZE NEUROSTIM,NO REPROG: ICD-10-PCS | Mod: S$PBB,59,, | Performed by: UROLOGY

## 2023-03-16 PROCEDURE — 99999 PR PBB SHADOW E&M-EST. PATIENT-LVL II: ICD-10-PCS | Mod: PBBFAC,,, | Performed by: UROLOGY

## 2023-03-16 PROCEDURE — 99215 OFFICE O/P EST HI 40 MIN: CPT | Mod: 25,S$PBB,, | Performed by: UROLOGY

## 2023-03-16 PROCEDURE — 87088 URINE BACTERIA CULTURE: CPT | Performed by: UROLOGY

## 2023-03-16 PROCEDURE — 87186 SC STD MICRODIL/AGAR DIL: CPT | Performed by: UROLOGY

## 2023-03-16 PROCEDURE — 99215 PR OFFICE/OUTPT VISIT, EST, LEVL V, 40-54 MIN: ICD-10-PCS | Mod: 25,S$PBB,, | Performed by: UROLOGY

## 2023-03-16 RX ORDER — ALPRAZOLAM 2 MG/1
2 TABLET ORAL NIGHTLY PRN
Qty: 60 TABLET | Refills: 2 | Status: SHIPPED | OUTPATIENT
Start: 2023-03-16 | End: 2023-08-29 | Stop reason: SDUPTHER

## 2023-03-16 RX ORDER — AMOXICILLIN AND CLAVULANATE POTASSIUM 875; 125 MG/1; MG/1
1 TABLET, FILM COATED ORAL 2 TIMES DAILY
Qty: 14 TABLET | Refills: 0 | Status: SHIPPED | OUTPATIENT
Start: 2023-03-16 | End: 2023-03-23

## 2023-03-16 NOTE — PROGRESS NOTES
CC: InterStim check    HPI:     Hx of recurrent UTI, hx of incomplete bladder emptying, IC, chronic constipation, s/p InterStim Therapy    Elana Gonzales is a 46 y.o. female with incomplete bladder emptying, interstitial cystitis, and recurrent UTIs. She has tried and failed multiple medical therapies.   C/o incomplete bladder emptying, chronic bladder pain and chronic constipation.  She saw Dr. Kinsey in GI in the past, and has taken Miralax.  I also recommended Probiotics.    Elana Gonzales is a 45 y.o. female with incomplete bladder emptying, interstitial cystitis, and recurrent UTIs. She has tried and failed multiple medical therapies. She underwent Stage 1 Interstim on 7/22 with an excellent response.   Procedure(s) Performed: 8/5/20  1.  Insertion of peripheral neurostimulator pulse generator (83423)  2.  Electronic analysis of implanted neurostimulator pulse generator system with intraoperative or subsequent programming (82610)  Findings:  Lead placed on right side  Generator placed on right side  Good sensory and motor function c/w S3 stimulation seen    So far she has done really well.  No recurrent UTI since InterStim Therapy.  Has been on IC meds and Xanax 2 mg q hs for her anxiety, which I have tried to wean but she could not tolerate it.  C/o wt gain.  Otherwise she is doing really well in regards to her bladder symptoms.      Physical Exam  HENT:      Head: Normocephalic and atraumatic.   Eyes:      Conjunctiva/sclera: Conjunctivae normal.      Pupils: Pupils are equal, round, and reactive to light.   Cardiovascular:      Rate and Rhythm: Normal rate.      Heart sounds: Normal heart sounds.   Pulmonary:      Effort: Pulmonary effort is normal.      Breath sounds: Normal breath sounds.   Abdominal:      General: Bowel sounds are normal.      Palpations: Abdomen is soft.   Genitourinary:     Comments: InterStim wound well healed.  Musculoskeletal:         General: Normal range of motion.      Cervical  back: Normal range of motion and neck supple.   Skin:     General: Skin is warm and dry.   Neurological:      Mental Status: She is alert and oriented to person, place, and time.      Gait: Gait is intact.   Psychiatric:         Mood and Affect: Mood and affect normal.         Cognition and Memory: Memory normal.         Judgment: Judgment normal.     KUB 11/2020  InterStim generator and its electrode located on the right side.    InterStim Neurostimulator Reprogrammin Procedure:    Using the , different setting were tried until patient felt the appropriate responses.  Originally she had a setting #3 at 1.6 volts.  I think it was too high.  Turned off the device and all pain disappeared.  Impedence check was normal.  Battery life is 90 months.  Reprogram done.  Setting #2 at 0.7 volts resulted in comfortable stimulation in the right perineal area.  Patient experienced the better stimulation as a result of reprogramming and verbalized the familiarity of using an own controller.      UA trace leuko trace blood today.    Assessment and plan:     Recurrent UTI  -     Urine culture  -     amoxicillin-clavulanate 875-125mg (AUGMENTIN) 875-125 mg per tablet; Take 1 tablet by mouth 2 (two) times daily. for 7 days  Dispense: 14 tablet; Refill: 0    Anxiety  -     ALPRAZolam (XANAX) 2 MG Tab; Take 1 tablet (2 mg total) by mouth nightly as needed.  Dispense: 60 tablet; Refill: 2    Interstitial cystitis    Incomplete bladder emptying    Presence of neurostimulator      stop Elavil which may affect her wt gain.  Otherwise, continue IC meds and IC diet.  Pt prefers to continue oxybutynin xl which has improved her bladder control symptoms.  Continue Flomax daily.  I encouraged her to wean Xanax to 2 mg q hs prn.  I strongly recommend that she should try to wean Xanax.    S/p InterStim Theray ( both generator and its electrode are located on the right side).  I checked the device and no problem with impedence  nor battery life noted ( greater than 90 months left)    I reprogrammed her setting today..  Came in with Program 2 at 0.8 amp. Finally feeling the stimulation at 1.2 amp.  Check program 1, 2 and 3, all resulted in rectal stimulation.  Program 4 resulted in vaginal stimulation at less than 0.5 map.  So I left her new setting at program 4 at 0.6 map.  Reprogram of InStim therapy done.    Pay attention to urine collection ( clean catch urine) and recommend to get urine culture for any suspected UTI ( even possible cath urine for culture).    Treat chronic constipation.    Increase water and empty the bladder more regularly.  Probiotics daily (Align)  D-Mannose 1 gram twice a day  Cranberry Pills / Juice  Estrace cream applied to the urethra as directed 2 to 3 x a week.    Empty the bladder before and after sexual intercourse.     I spent 40 minutes with the patient of which more than half was spent in direct consultation with the patient in regards to our treatment and plan.        Follow up in about 1 year (around 3/16/2024).

## 2023-03-16 NOTE — PATIENT INSTRUCTIONS
Pay attention to urine collection ( clean catch urine) and recommend to get urine culture for any suspected UTI ( even possible cath urine for culture).    Treat chronic constipation.    Increase water and empty the bladder more regularly.  Probiotics daily (Align)  D-Mannose 1 gram twice a day  Cranberry Pills / Juice  Estrace cream applied to the urethra as directed 2 to 3 x a week.    Empty the bladder before and after sexual intercourse.

## 2023-03-18 LAB — BACTERIA UR CULT: ABNORMAL

## 2023-03-20 ENCOUNTER — TELEPHONE (OUTPATIENT)
Dept: UROLOGY | Facility: CLINIC | Age: 48
End: 2023-03-20
Payer: MEDICAID

## 2023-03-20 NOTE — TELEPHONE ENCOUNTER
Please inform her the followings:    E. Coli UTI confirmed.  Finish the abx given. ( Augmentin)  Recommend to take daily probiotics, D-mannose 1000 mg BID for UTI prevention along with cranberry pills/juice.

## 2023-03-31 ENCOUNTER — TELEPHONE (OUTPATIENT)
Dept: UROLOGY | Facility: CLINIC | Age: 48
End: 2023-03-31
Payer: MEDICAID

## 2023-03-31 NOTE — TELEPHONE ENCOUNTER
----- Message from Andria Alfonso sent at 3/31/2023 12:06 PM CDT -----  Regarding: Pt Advice / Results  Contact: Pt [phone]  Pt is calling and would like to speak with someone in provider office. Pt wants to know results of test. Please Call

## 2023-03-31 NOTE — TELEPHONE ENCOUNTER
Spoke with patient she completed the augmentin rx. , I reminded her to take d-mannose and cranberry pill

## 2023-04-20 DIAGNOSIS — K21.9 GASTROESOPHAGEAL REFLUX DISEASE: ICD-10-CM

## 2023-04-20 DIAGNOSIS — N30.10 IC (INTERSTITIAL CYSTITIS): ICD-10-CM

## 2023-04-20 DIAGNOSIS — G43.011 INTRACTABLE MIGRAINE WITHOUT AURA AND WITH STATUS MIGRAINOSUS: ICD-10-CM

## 2023-04-20 RX ORDER — GALCANEZUMAB 120 MG/ML
120 INJECTION, SOLUTION SUBCUTANEOUS
Qty: 3 ML | Refills: 3 | Status: SHIPPED | OUTPATIENT
Start: 2023-04-20 | End: 2023-12-12 | Stop reason: SDUPTHER

## 2023-04-20 RX ORDER — FAMOTIDINE 40 MG/1
40 TABLET, FILM COATED ORAL NIGHTLY PRN
Qty: 30 TABLET | Refills: 11 | Status: SHIPPED | OUTPATIENT
Start: 2023-04-20 | End: 2023-08-29

## 2023-05-06 RX ORDER — HYDROXYZINE HYDROCHLORIDE 25 MG/1
TABLET, FILM COATED ORAL
Qty: 30 TABLET | Refills: 5 | Status: SHIPPED | OUTPATIENT
Start: 2023-05-06 | End: 2023-08-29

## 2023-06-05 RX ORDER — PHENAZOPYRIDINE HYDROCHLORIDE 200 MG/1
TABLET, FILM COATED ORAL
Qty: 30 TABLET | Refills: 4 | Status: SHIPPED | OUTPATIENT
Start: 2023-06-05

## 2023-07-11 ENCOUNTER — TELEPHONE (OUTPATIENT)
Dept: GASTROENTEROLOGY | Facility: CLINIC | Age: 48
End: 2023-07-11
Payer: MEDICAID

## 2023-07-11 NOTE — TELEPHONE ENCOUNTER
Attempted to contact pt regarding message below. Pt didn't answer and I left a detail message to return call to our office.      ----- Message from Hemalatha Barros MA sent at 7/10/2023  9:47 PM CDT -----  Contact: pt    ----- Message -----  From: Lisette Everett  Sent: 7/10/2023  11:24 AM CDT  To: Jose David Smith Staff    Pt requesting call back RE: would like to speak with [place], no details given       Confirmed contact below:  Contact Name:Elana Gonzales  Phone Number: [phone]

## 2023-07-24 RX ORDER — PANTOPRAZOLE SODIUM 40 MG/1
40 TABLET, DELAYED RELEASE ORAL DAILY
Qty: 30 TABLET | Refills: 3 | Status: SHIPPED | OUTPATIENT
Start: 2023-07-24

## 2023-07-24 NOTE — TELEPHONE ENCOUNTER
Informed pt Dr. Main doesn't have any medicaid slot at this time. Rx pend and routed to Dr. Main and scheduled pt an appt to be seen by BRENT Sanchez on 8/9 at 2 pm at the [place] location.   Pt verbalize understand, confirmed appt and thank me.   ----- Message from Patience Hernandez sent at 7/24/2023  1:58 PM CDT -----  Regarding: Call back requested  Contact: [phone]  Hi, pt called to request a call back from the office to discuss getting a new order for the pantoprazole (PROTONIX) 40 MG tablet. Pt says she does not have any refills left. Pls send the order to:      [facility] [place], [place] - [address]  [address]  [place] [address]  Phone: [phone] Fax: [phone]

## 2023-08-29 ENCOUNTER — RESEARCH ENCOUNTER (OUTPATIENT)
Dept: RESEARCH | Facility: HOSPITAL | Age: 48
End: 2023-08-29
Payer: MEDICAID

## 2023-08-29 ENCOUNTER — OFFICE VISIT (OUTPATIENT)
Dept: UROLOGY | Facility: CLINIC | Age: 48
End: 2023-08-29
Payer: MEDICAID

## 2023-08-29 VITALS
HEIGHT: 63 IN | WEIGHT: 210 LBS | DIASTOLIC BLOOD PRESSURE: 78 MMHG | SYSTOLIC BLOOD PRESSURE: 118 MMHG | HEART RATE: 87 BPM | BODY MASS INDEX: 37.21 KG/M2

## 2023-08-29 DIAGNOSIS — N32.81 OAB (OVERACTIVE BLADDER): ICD-10-CM

## 2023-08-29 DIAGNOSIS — R39.16 STRAINS TO URINATE: ICD-10-CM

## 2023-08-29 DIAGNOSIS — R33.9 INCOMPLETE BLADDER EMPTYING: ICD-10-CM

## 2023-08-29 DIAGNOSIS — N30.10 INTERSTITIAL CYSTITIS: ICD-10-CM

## 2023-08-29 DIAGNOSIS — K21.9 GASTROESOPHAGEAL REFLUX DISEASE: ICD-10-CM

## 2023-08-29 DIAGNOSIS — F41.9 ANXIETY: ICD-10-CM

## 2023-08-29 DIAGNOSIS — N30.10 IC (INTERSTITIAL CYSTITIS): ICD-10-CM

## 2023-08-29 DIAGNOSIS — Z96.82 PRESENCE OF NEUROSTIMULATOR: Primary | ICD-10-CM

## 2023-08-29 PROCEDURE — 95970 ALYS NPGT W/O PRGRMG: CPT | Mod: PBBFAC | Performed by: UROLOGY

## 2023-08-29 PROCEDURE — 99999 PR PBB SHADOW E&M-EST. PATIENT-LVL III: CPT | Mod: PBBFAC,,, | Performed by: UROLOGY

## 2023-08-29 PROCEDURE — 95970 ALYS NPGT W/O PRGRMG: CPT | Mod: S$PBB,,, | Performed by: UROLOGY

## 2023-08-29 PROCEDURE — 3078F PR MOST RECENT DIASTOLIC BLOOD PRESSURE < 80 MM HG: ICD-10-PCS | Mod: CPTII,,, | Performed by: UROLOGY

## 2023-08-29 PROCEDURE — 3008F BODY MASS INDEX DOCD: CPT | Mod: CPTII,,, | Performed by: UROLOGY

## 2023-08-29 PROCEDURE — 99999 PR PBB SHADOW E&M-EST. PATIENT-LVL III: ICD-10-PCS | Mod: PBBFAC,,, | Performed by: UROLOGY

## 2023-08-29 PROCEDURE — 3074F SYST BP LT 130 MM HG: CPT | Mod: CPTII,,, | Performed by: UROLOGY

## 2023-08-29 PROCEDURE — 95970 PR ANALYZE NEUROSTIM,NO REPROG: ICD-10-PCS | Mod: S$PBB,,, | Performed by: UROLOGY

## 2023-08-29 PROCEDURE — 3078F DIAST BP <80 MM HG: CPT | Mod: CPTII,,, | Performed by: UROLOGY

## 2023-08-29 PROCEDURE — 99214 OFFICE O/P EST MOD 30 MIN: CPT | Mod: 25,S$PBB,, | Performed by: UROLOGY

## 2023-08-29 PROCEDURE — 3074F PR MOST RECENT SYSTOLIC BLOOD PRESSURE < 130 MM HG: ICD-10-PCS | Mod: CPTII,,, | Performed by: UROLOGY

## 2023-08-29 PROCEDURE — 3008F PR BODY MASS INDEX (BMI) DOCUMENTED: ICD-10-PCS | Mod: CPTII,,, | Performed by: UROLOGY

## 2023-08-29 PROCEDURE — 99214 PR OFFICE/OUTPT VISIT, EST, LEVL IV, 30-39 MIN: ICD-10-PCS | Mod: 25,S$PBB,, | Performed by: UROLOGY

## 2023-08-29 PROCEDURE — 99213 OFFICE O/P EST LOW 20 MIN: CPT | Mod: PBBFAC | Performed by: UROLOGY

## 2023-08-29 RX ORDER — ALPRAZOLAM 2 MG/1
2 TABLET ORAL NIGHTLY PRN
Qty: 60 TABLET | Refills: 2 | Status: SHIPPED | OUTPATIENT
Start: 2023-08-29 | End: 2023-08-29 | Stop reason: SDUPTHER

## 2023-08-29 RX ORDER — TAMSULOSIN HYDROCHLORIDE 0.4 MG/1
1 CAPSULE ORAL DAILY
Qty: 90 CAPSULE | Refills: 3 | Status: SHIPPED | OUTPATIENT
Start: 2023-08-29 | End: 2024-03-11

## 2023-08-29 RX ORDER — HYDROXYZINE HYDROCHLORIDE 25 MG/1
25 TABLET, FILM COATED ORAL NIGHTLY
Qty: 90 TABLET | Refills: 3 | Status: SHIPPED | OUTPATIENT
Start: 2023-08-29 | End: 2023-09-19 | Stop reason: SDUPTHER

## 2023-08-29 RX ORDER — AMITRIPTYLINE HYDROCHLORIDE 25 MG/1
25 TABLET, FILM COATED ORAL NIGHTLY
Qty: 90 TABLET | Refills: 11 | Status: SHIPPED | OUTPATIENT
Start: 2023-08-29 | End: 2024-03-21 | Stop reason: SDUPTHER

## 2023-08-29 RX ORDER — ALPRAZOLAM 2 MG/1
2 TABLET ORAL NIGHTLY PRN
Qty: 30 TABLET | Refills: 5 | Status: SHIPPED | OUTPATIENT
Start: 2023-08-29 | End: 2024-03-22

## 2023-08-29 RX ORDER — FAMOTIDINE 40 MG/1
40 TABLET, FILM COATED ORAL NIGHTLY PRN
Qty: 90 TABLET | Refills: 3 | Status: SHIPPED | OUTPATIENT
Start: 2023-08-29 | End: 2024-08-28

## 2023-08-29 RX ORDER — OXYBUTYNIN CHLORIDE 10 MG/1
10 TABLET, EXTENDED RELEASE ORAL DAILY
Qty: 90 TABLET | Refills: 3 | Status: SHIPPED | OUTPATIENT
Start: 2023-08-29 | End: 2024-03-21 | Stop reason: SDUPTHER

## 2023-08-29 NOTE — PROGRESS NOTES
CC: InterStim check    HPI:     Hx of recurrent UTI, hx of incomplete bladder emptying, IC, chronic constipation, s/p InterStim Therapy    Elana Gonzales is a 46 y.o. female with incomplete bladder emptying, interstitial cystitis, and recurrent UTIs. She has tried and failed multiple medical therapies.   C/o incomplete bladder emptying, chronic bladder pain and chronic constipation.  She saw Dr. Kinsey in GI in the past, and has taken Miralax.  I also recommended Probiotics.    Elana Gonzales is a 45 y.o. female with incomplete bladder emptying, interstitial cystitis, and recurrent UTIs. She has tried and failed multiple medical therapies. She underwent Stage 1 Interstim on 7/22 with an excellent response.   Procedure(s) Performed: 8/5/20  1.  Insertion of peripheral neurostimulator pulse generator (28750)  2.  Electronic analysis of implanted neurostimulator pulse generator system with intraoperative or subsequent programming (37090)  Findings:  Lead placed on right side  Generator placed on right side  Good sensory and motor function c/w S3 stimulation seen    So far she has done really well.  No recurrent UTI since InterStim Therapy.  Has been on IC meds and Xanax 2 mg q hs for her anxiety, which I have tried to wean but she could not tolerate it.  C/o wt gain.  Otherwise she is doing really well in regards to her bladder symptoms.      Physical Exam  HENT:      Head: Normocephalic and atraumatic.   Eyes:      Conjunctiva/sclera: Conjunctivae normal.      Pupils: Pupils are equal, round, and reactive to light.   Cardiovascular:      Rate and Rhythm: Normal rate.      Heart sounds: Normal heart sounds.   Pulmonary:      Effort: Pulmonary effort is normal.      Breath sounds: Normal breath sounds.   Abdominal:      General: Bowel sounds are normal.      Palpations: Abdomen is soft.   Genitourinary:     Comments: InterStim wound well healed.  Musculoskeletal:         General: Normal range of motion.      Cervical  back: Normal range of motion and neck supple.   Skin:     General: Skin is warm and dry.   Neurological:      Mental Status: She is alert and oriented to person, place, and time.      Gait: Gait is intact.   Psychiatric:         Mood and Affect: Mood and affect normal.         Cognition and Memory: Memory normal.         Judgment: Judgment normal.       RUST 11/2020  InterStim generator and its electrode located on the right side.    InterStim Neurostimulator Reprogrammin Procedure:  Today, Research nurse Catrachita, evaluate her device.  Using the , different setting were tried until patient felt the appropriate responses.  Originally she had a setting #3 at 1.6 volts.  I think it was too high.  Turned off the device and all pain disappeared.  Impedence check was normal.  Battery life is 90 months.  Reprogram done.  Setting #2 at 0.7 volts resulted in comfortable stimulation in the right perineal area.  Patient experienced the better stimulation as a result of reprogramming and verbalized the familiarity of using an own controller.      UA clear today    Assessment and plan:     Presence of neurostimulator    Interstitial cystitis    Incomplete bladder emptying    Anxiety  -     Discontinue: ALPRAZolam (XANAX) 2 MG Tab; Take 1 tablet (2 mg total) by mouth nightly as needed.  Dispense: 60 tablet; Refill: 2  -     ALPRAZolam (XANAX) 2 MG Tab; Take 1 tablet (2 mg total) by mouth nightly as needed.  Dispense: 30 tablet; Refill: 5    IC (interstitial cystitis)  -     amitriptyline (ELAVIL) 25 MG tablet; Take 1 tablet (25 mg total) by mouth every evening.  Dispense: 90 tablet; Refill: 11  -     famotidine (PEPCID) 40 MG tablet; Take 1 tablet (40 mg total) by mouth nightly as needed (bladder pain).  Dispense: 90 tablet; Refill: 3  -     hydrOXYzine HCL (ATARAX) 25 MG tablet; Take 1 tablet (25 mg total) by mouth every evening.  Dispense: 90 tablet; Refill: 3    Gastroesophageal reflux disease  -     famotidine  (PEPCID) 40 MG tablet; Take 1 tablet (40 mg total) by mouth nightly as needed (bladder pain).  Dispense: 90 tablet; Refill: 3    OAB (overactive bladder)  -     oxybutynin (DITROPAN-XL) 10 MG 24 hr tablet; Take 1 tablet (10 mg total) by mouth once daily.  Dispense: 90 tablet; Refill: 3    Strains to urinate  -     tamsulosin (FLOMAX) 0.4 mg Cap; Take 1 capsule (0.4 mg total) by mouth once daily.  Dispense: 90 capsule; Refill: 3      Continue IC smart diet.  continue IC meds and IC diet.  Pt prefers to continue oxybutynin xl which has improved her bladder control symptoms.  Continue Flomax daily.  I encouraged her to wean Xanax to 2 mg q hs prn.  I strongly recommend that she should try to wean Xanax.    S/p InterStim Theray ( both generator and its electrode are located on the right side).  I checked the device and no problem with impedence nor battery life noted ( greater than 90 months left)    No recent UTI noted.    I spent 25 minutes with the patient of which more than half was spent in direct consultation with the patient in regards to our treatment and plan.        Follow up in about 1 year (around 8/29/2024).

## 2023-08-29 NOTE — PROGRESS NOTES
[employer] PSR study   IRB number: [id number]  PI: Dr. Yong Anguiano  Patient number: [id number]      1. FOLLOW-UP   1.1  Follow-up visit type: (calculated field): 36 month    1.2  Visit setting:  In-office follow-up   1.3  Follow-up site/clinic: [facility] [address]    1.4  Follow-up clinician: Dr. Yong Anguiano  2. EVENTS ASSESSMENT  Have the patient's medical records been reviewed to ensure that all events that occurred since the last visit have been reported?       2.1  Neurostimulator related events:   Yes   2.2  Lead related events:  Yes   2.3  Extension related events:  Not Applicable   2.4.  Programming/stimulation related events:  Yes   If any event occurred since the last visit, please review the events that have been entered and determine whether the event needs to be reported.     3.  OTHER HOSPITAL ADMISSIONS  Please record any hospitalizations unrelated to the items entered above as those hospitalizations will be recorded on the event form(s):    3.1  Has the patient had any other hospital admission(s) (> 24 hours inpatient stay) since the previous visit:  No            4.  MRI INFORMATION   4.1  Has the patient received a MRI scan since the last follow-up:    No          5. DEVICE DATA  5.1 Was the Programming CRF completed?     Yes     PSR Programming Settings: (P7)     Initial Active Program  Not applicable - Implant Final Active Program   Amplitude (mA) 0.4 0.4   Pulse Width (usec) 8.5 8.5   Amplitude Limit (mA)  210 210   Rate (Hz)  14 14   Cycling  na na   Polarity: Case off off   Polarity: Electrode 3 negative Negative    Polarity: Electrode 2 Negative  Negative    Polarity: Electrode 1 off Off    Polarity: Electrode 0 positive Positive    Therapy Impedance (ohms)  1004 1004     6.  OUTCOME MEASURES  OAB-q short form, FIQOL outcome measures were not included with PSR CIP versions 6.0-9.0.   Patient is unable to complete outcome measures due to cognitive, communication deficits and/or  age/development level     6.1 Was the Patient Global Impression of Improvement (PGI-I) completed by the patient?    Yes     Check the one number that best describes how your bladder or bowl condition is now, compared with how it was before you received the InterStim Implant:  1 Very much better    6.2 Was the Urinary Incontinence Quality of Life (OAB-q short form) completed by the patient?  Patient does not have urinary disorder      6.3  Was the Fecal Incontinence Quality of Life (FIQOL) completed by the patient?    Patient does not have bowel disorder

## 2023-09-19 DIAGNOSIS — N30.10 IC (INTERSTITIAL CYSTITIS): ICD-10-CM

## 2023-09-19 RX ORDER — HYDROXYZINE HYDROCHLORIDE 25 MG/1
25 TABLET, FILM COATED ORAL NIGHTLY
Qty: 90 TABLET | Refills: 3 | Status: SHIPPED | OUTPATIENT
Start: 2023-09-19 | End: 2024-03-21 | Stop reason: SDUPTHER

## 2023-09-19 NOTE — TELEPHONE ENCOUNTER
----- Message from Sybil Oliveira sent at 9/19/2023 11:02 AM CDT -----  Regarding: Refill  Contact: Pt [phone]  Rx Refill/Request    Is this a Refill or New Rx: refill     Rx Name and Strength:  hydrOXYzine HCL (ATARAX) 25 MG tablet 90 tablet     Preferred Pharmacy with phone number:  [facility], [place] - [address]  [address] [address]  Phone: [phone] Fax: [phone]       Additional Information: needs new prescription, out of refill. Please call [phone]

## 2023-10-30 ENCOUNTER — TELEPHONE (OUTPATIENT)
Dept: NEUROLOGY | Facility: CLINIC | Age: 48
End: 2023-10-30
Payer: MEDICAID

## 2023-10-30 NOTE — TELEPHONE ENCOUNTER
----- Message from Justus Saundres sent at 10/30/2023  3:21 PM CDT -----  Contact: [phone]  Elana Gonzales calling regarding Appointment Access  (message) Pt asking for a call back she would like to confirm the appt that was offer to her for 11/17 at 8:40 asking for a call back to verity she has the appt

## 2023-11-02 ENCOUNTER — TELEPHONE (OUTPATIENT)
Dept: NEUROLOGY | Facility: CLINIC | Age: 48
End: 2023-11-02
Payer: MEDICAID

## 2023-11-06 ENCOUNTER — TELEPHONE (OUTPATIENT)
Dept: NEUROLOGY | Facility: CLINIC | Age: 48
End: 2023-11-06

## 2023-11-06 NOTE — TELEPHONE ENCOUNTER
----- Message from Evelin Haji RN sent at 10/17/2023  8:02 AM CDT -----  Regarding: FW: appt access  Contact: [phone]  Gus Yanez appt request  ----- Message -----  From: Minna Garzon  Sent: 10/13/2023   9:25 AM CDT  To: Gus JORDAN Staff  Subject: appt access                                      Caller requesting an appt.      Request message be sent to doctor.     Name of Caller: Elana Gonzales     When is 1st available appt: nothing in epic, I attempted to schedule     Symptoms: pain in temple (constant pain)    Call back # [phone]    Additional Info: (optional): pt states its not a headache, and it is a new problem she having. Please give pt a call to schedule thanks.

## 2023-12-12 ENCOUNTER — OFFICE VISIT (OUTPATIENT)
Dept: NEUROLOGY | Facility: CLINIC | Age: 48
End: 2023-12-12
Payer: MEDICAID

## 2023-12-12 VITALS
BODY MASS INDEX: 39.05 KG/M2 | HEIGHT: 63 IN | HEART RATE: 82 BPM | WEIGHT: 220.38 LBS | DIASTOLIC BLOOD PRESSURE: 85 MMHG | SYSTOLIC BLOOD PRESSURE: 117 MMHG

## 2023-12-12 DIAGNOSIS — G43.011 INTRACTABLE MIGRAINE WITHOUT AURA AND WITH STATUS MIGRAINOSUS: Primary | ICD-10-CM

## 2023-12-12 PROCEDURE — 99999 PR PBB SHADOW E&M-EST. PATIENT-LVL III: CPT | Mod: PBBFAC,,, | Performed by: PSYCHIATRY & NEUROLOGY

## 2023-12-12 PROCEDURE — 1160F PR REVIEW ALL MEDS BY PRESCRIBER/CLIN PHARMACIST DOCUMENTED: ICD-10-PCS | Mod: CPTII,,, | Performed by: PSYCHIATRY & NEUROLOGY

## 2023-12-12 PROCEDURE — 99214 OFFICE O/P EST MOD 30 MIN: CPT | Mod: S$PBB,,, | Performed by: PSYCHIATRY & NEUROLOGY

## 2023-12-12 PROCEDURE — 3074F PR MOST RECENT SYSTOLIC BLOOD PRESSURE < 130 MM HG: ICD-10-PCS | Mod: CPTII,,, | Performed by: PSYCHIATRY & NEUROLOGY

## 2023-12-12 PROCEDURE — 3008F PR BODY MASS INDEX (BMI) DOCUMENTED: ICD-10-PCS | Mod: CPTII,,, | Performed by: PSYCHIATRY & NEUROLOGY

## 2023-12-12 PROCEDURE — 1159F PR MEDICATION LIST DOCUMENTED IN MEDICAL RECORD: ICD-10-PCS | Mod: CPTII,,, | Performed by: PSYCHIATRY & NEUROLOGY

## 2023-12-12 PROCEDURE — 3079F DIAST BP 80-89 MM HG: CPT | Mod: CPTII,,, | Performed by: PSYCHIATRY & NEUROLOGY

## 2023-12-12 PROCEDURE — 3008F BODY MASS INDEX DOCD: CPT | Mod: CPTII,,, | Performed by: PSYCHIATRY & NEUROLOGY

## 2023-12-12 PROCEDURE — 3079F PR MOST RECENT DIASTOLIC BLOOD PRESSURE 80-89 MM HG: ICD-10-PCS | Mod: CPTII,,, | Performed by: PSYCHIATRY & NEUROLOGY

## 2023-12-12 PROCEDURE — 1159F MED LIST DOCD IN RCRD: CPT | Mod: CPTII,,, | Performed by: PSYCHIATRY & NEUROLOGY

## 2023-12-12 PROCEDURE — 99214 PR OFFICE/OUTPT VISIT, EST, LEVL IV, 30-39 MIN: ICD-10-PCS | Mod: S$PBB,,, | Performed by: PSYCHIATRY & NEUROLOGY

## 2023-12-12 PROCEDURE — 3074F SYST BP LT 130 MM HG: CPT | Mod: CPTII,,, | Performed by: PSYCHIATRY & NEUROLOGY

## 2023-12-12 PROCEDURE — 99999 PR PBB SHADOW E&M-EST. PATIENT-LVL III: ICD-10-PCS | Mod: PBBFAC,,, | Performed by: PSYCHIATRY & NEUROLOGY

## 2023-12-12 PROCEDURE — 99213 OFFICE O/P EST LOW 20 MIN: CPT | Mod: PBBFAC | Performed by: PSYCHIATRY & NEUROLOGY

## 2023-12-12 PROCEDURE — 1160F RVW MEDS BY RX/DR IN RCRD: CPT | Mod: CPTII,,, | Performed by: PSYCHIATRY & NEUROLOGY

## 2023-12-12 RX ORDER — PROMETHAZINE HYDROCHLORIDE 50 MG/1
50 TABLET ORAL EVERY 8 HOURS PRN
Qty: 15 TABLET | Refills: 2 | Status: SHIPPED | OUTPATIENT
Start: 2023-12-12

## 2023-12-12 RX ORDER — GALCANEZUMAB 120 MG/ML
120 INJECTION, SOLUTION SUBCUTANEOUS
Qty: 3 ML | Refills: 3 | Status: SHIPPED | OUTPATIENT
Start: 2023-12-12

## 2023-12-12 RX ORDER — GALCANEZUMAB 120 MG/ML
120 INJECTION, SOLUTION SUBCUTANEOUS
Qty: 3 ML | Refills: 3 | Status: SHIPPED | OUTPATIENT
Start: 2023-12-12 | End: 2023-12-12 | Stop reason: SDUPTHER
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
## 2023-12-19 ENCOUNTER — HOSPITAL ENCOUNTER (OUTPATIENT)
Dept: RADIOLOGY | Facility: HOSPITAL | Age: 48
Discharge: HOME | End: 2023-12-19
Attending: STUDENT IN AN ORGANIZED HEALTH CARE EDUCATION/TRAINING PROGRAM
Payer: MEDICAID

## 2023-12-19 DIAGNOSIS — G89.29 CHRONIC PAIN OF RIGHT KNEE: ICD-10-CM

## 2023-12-19 DIAGNOSIS — M25.561 CHRONIC PAIN OF RIGHT KNEE: ICD-10-CM

## 2024-02-08 ENCOUNTER — TELEPHONE (OUTPATIENT)
Dept: UROLOGY | Facility: CLINIC | Age: 49
End: 2024-02-08
Payer: MEDICAID

## 2024-02-08 DIAGNOSIS — R30.0 DYSURIA: Primary | ICD-10-CM

## 2024-02-08 RX ORDER — PHENAZOPYRIDINE HYDROCHLORIDE 200 MG/1
200 TABLET, FILM COATED ORAL 3 TIMES DAILY PRN
Qty: 30 TABLET | Refills: 3 | Status: SHIPPED | OUTPATIENT
Start: 2024-02-08 | End: 2024-02-18

## 2024-02-08 NOTE — TELEPHONE ENCOUNTER
----- Message from Shannan Frank LPN sent at 2/7/2024  1:06 PM CST -----  Regarding: FW: RX request  Contact: [phone]  Pt states she has to strain to urinate. She states she has ic and in the past you treated this with meds. Please review and advise.  ----- Message -----  From: Vivian Zambrano  Sent: 2/6/2024   4:02 PM CST  To: Silvio ROMEO Staff  Subject: RX request                                       Pt is following up to see if the RX that was discussed was sent to the pt pharmacy. Pt states she is in pain.         [facility] [address] [place] [address]  [address]  [place] [address]  Phone: [phone] Fax: [phone]

## 2024-02-08 NOTE — TELEPHONE ENCOUNTER
Dysuria  -     phenazopyridine (PYRIDIUM) 200 MG tablet; Take 1 tablet (200 mg total) by mouth 3 (three) times daily as needed for Pain.  Dispense: 30 tablet; Refill: 3     She can continue flomax  Start Pyridium and see how she does.  If not improving, she needs to come in for bladder instillation for her IC and bladder pain.  She can be seen by JEANETH.

## 2024-02-12 ENCOUNTER — TELEPHONE (OUTPATIENT)
Dept: UROLOGY | Facility: CLINIC | Age: 49
End: 2024-02-12
Payer: MEDICAID

## 2024-02-12 NOTE — TELEPHONE ENCOUNTER
----- Message from Yoko Arenas LPN sent at 2/12/2024  8:52 AM CST -----  Pt will keep appt on 2/15 at 1:40pm.   ----- Message -----  From: Sabina Mills LPN  Sent: 2/9/2024   5:28 PM CST  To: Yoko Arenas LPN    Can you please tell her dr. Anguiano can see her this Thursday, if she can not make the miri't , let her know I am out until Thursday and will call her then. Thanks   ----- Message -----  From: Yong Anguiano MD  Sent: 2/8/2024   5:46 PM CST  To: Sabina Mills LPN    May need a bladder instillation.  She can come and see me or MIRI  ----- Message -----  From: Sabina Mills LPN  Sent: 2/5/2024  10:36 AM CST  To: Yong Anguiano MD    Pt has [employer] -states she feels the interstim well. She is on flomax and xanax 2mg QHS . She feels she need to push to void. She is not sure if she has an infection. Pt has medicaid    ----- Message -----  From: Mady Beach  Sent: 2/5/2024   8:58 AM CST  To: Silvio ROMEO Staff    Elana Gonzales calling regarding Patient Advice for wanting to speak to the nurse about her having trouble urinating again, she feels that she is having to put force in to urinate, please call back to advise PT, [phone]

## 2024-02-20 ENCOUNTER — TELEPHONE (OUTPATIENT)
Dept: UROLOGY | Facility: CLINIC | Age: 49
End: 2024-02-20
Payer: MEDICAID

## 2024-02-20 NOTE — TELEPHONE ENCOUNTER
----- Message from Vivian Zambrano sent at 2/20/2024 11:34 AM CST -----  Regarding: Appt  Contact: [phone]  Patient is calling to schedule appointment due to add on per dr bolden check interstm. Please contact patient to further discuss.

## 2024-03-07 ENCOUNTER — TELEPHONE (OUTPATIENT)
Dept: UROLOGY | Facility: CLINIC | Age: 49
End: 2024-03-07
Payer: MEDICAID

## 2024-03-07 ENCOUNTER — RESEARCH ENCOUNTER (OUTPATIENT)
Dept: RESEARCH | Facility: HOSPITAL | Age: 49
End: 2024-03-07

## 2024-03-07 ENCOUNTER — OFFICE VISIT (OUTPATIENT)
Dept: UROLOGY | Facility: CLINIC | Age: 49
End: 2024-03-07
Payer: MEDICAID

## 2024-03-07 VITALS
DIASTOLIC BLOOD PRESSURE: 83 MMHG | BODY MASS INDEX: 37.21 KG/M2 | SYSTOLIC BLOOD PRESSURE: 127 MMHG | WEIGHT: 210 LBS | HEIGHT: 63 IN | HEART RATE: 121 BPM

## 2024-03-07 DIAGNOSIS — Z53.09 MRI CONTRAINDICATED DUE TO METAL IMPLANT: ICD-10-CM

## 2024-03-07 DIAGNOSIS — Z96.82 NEUROSTIMULATOR DEVICE IN SITU: ICD-10-CM

## 2024-03-07 DIAGNOSIS — Z96.82 NEUROSTIMULATOR DEVICE IN SITU: Primary | ICD-10-CM

## 2024-03-07 DIAGNOSIS — N32.81 OAB (OVERACTIVE BLADDER): Primary | ICD-10-CM

## 2024-03-07 PROCEDURE — 1159F MED LIST DOCD IN RCRD: CPT | Mod: CPTII,,, | Performed by: UROLOGY

## 2024-03-07 PROCEDURE — 99999 PR PBB SHADOW E&M-EST. PATIENT-LVL III: CPT | Mod: PBBFAC,,, | Performed by: UROLOGY

## 2024-03-07 PROCEDURE — 99215 OFFICE O/P EST HI 40 MIN: CPT | Mod: 57,S$PBB,, | Performed by: UROLOGY

## 2024-03-07 PROCEDURE — 3074F SYST BP LT 130 MM HG: CPT | Mod: CPTII,,, | Performed by: UROLOGY

## 2024-03-07 PROCEDURE — 3008F BODY MASS INDEX DOCD: CPT | Mod: CPTII,,, | Performed by: UROLOGY

## 2024-03-07 PROCEDURE — 3079F DIAST BP 80-89 MM HG: CPT | Mod: CPTII,,, | Performed by: UROLOGY

## 2024-03-07 PROCEDURE — 99213 OFFICE O/P EST LOW 20 MIN: CPT | Mod: PBBFAC | Performed by: UROLOGY

## 2024-03-07 NOTE — PATIENT INSTRUCTIONS
Fly ([place]) Alex, my surgery scheduler will schedule your surgery ([phone]).  The risks and benefits of the procedure were discussed with the patient in detail.    The patient was told to stop all blood thinners prior to surgery.    Antibiotic soap shower a night before surgery

## 2024-03-07 NOTE — PROGRESS NOTES
CC: InterStim check, needs MRI of right knee    HPI:     Hx of recurrent UTI, hx of incomplete bladder emptying, IC, chronic constipation, s/p InterStim Therapy    Elana Gonzales is a 46 y.o. female with incomplete bladder emptying, interstitial cystitis, and recurrent UTIs. She has tried and failed multiple medical therapies.   C/o incomplete bladder emptying, chronic bladder pain and chronic constipation.  She saw Dr. Kinsey in GI in the past, and has taken Miralax.  I also recommended Probiotics.    She presents today to check InterStim Therapy because she needs MRI of right knee for possible meniscus tear.  However, her InterStim Therapy was placed in 2020 and is not MRI safe  Elana Gonzales is a 45 y.o. female with incomplete bladder emptying, interstitial cystitis, and recurrent UTIs. She has tried and failed multiple medical therapies. She underwent Stage 1 Interstim on 7/22 with an excellent response.   Procedure(s) Performed: 8/5/20  1.  Insertion of peripheral neurostimulator pulse generator (80441)  2.  Electronic analysis of implanted neurostimulator pulse generator system with intraoperative or subsequent programming (69428)  Findings:  Lead placed on right side  Generator placed on right side  Good sensory and motor function c/w S3 stimulation seen    So far she has done really well.  No recurrent UTI since InterStim Therapy.  Has been on IC meds and Xanax 2 mg q hs for her anxiety, which I have tried to wean but she could not tolerate it.  C/o wt gain.  Otherwise she is doing really well in regards to her bladder symptoms.      Physical Exam  HENT:      Head: Normocephalic and atraumatic.   Eyes:      Conjunctiva/sclera: Conjunctivae normal.      Pupils: Pupils are equal, round, and reactive to light.   Cardiovascular:      Rate and Rhythm: Normal rate.      Heart sounds: Normal heart sounds.   Pulmonary:      Effort: Pulmonary effort is normal.      Breath sounds: Normal breath sounds.    Abdominal:      General: Bowel sounds are normal.      Palpations: Abdomen is soft.   Genitourinary:     Comments: InterStim wound well healed.  Musculoskeletal:         General: Normal range of motion.      Cervical back: Normal range of motion and neck supple.   Skin:     General: Skin is warm and dry.   Neurological:      Mental Status: She is alert and oriented to person, place, and time.      Gait: Gait is intact.   Psychiatric:         Mood and Affect: Mood and affect normal.         Cognition and Memory: Memory normal.         Judgment: Judgment normal.       KUB 11/2020  InterStim generator and its electrode located on the right side.    InterStim Neurostimulator Reprogrammin Procedure:  Today, Research nurse Catrachita, evaluate her device.  Using the , different setting were tried until patient felt the appropriate responses.  Originally she had a setting #3 at 1.6 volts.  I think it was too high.  Turned off the device and all pain disappeared.  Impedence check was normal.  Battery life is 90 months.  Reprogram done.  Setting #2 at 0.7 volts resulted in comfortable stimulation in the right perineal area.  Patient experienced the better stimulation as a result of reprogramming and verbalized the familiarity of using an own controller.      UA clear today    Assessment and plan:     Neurostimulator device in situ, InterStim Therapy, sacral  -     X-Ray Sacrum And Coccyx; Future; Expected date: 03/07/2024  -     X-Ray Pelvis Routine AP; Future; Expected date: 03/07/2024    MRI contraindicated due to metal implant  -     CBC Auto Differential; Future; Expected date: 03/07/2024  -     Comprehensive Metabolic Panel; Future; Expected date: 03/07/2024  -     EKG 12-lead; Future; Expected date: 03/07/2024      Continue IC smart diet.  continue IC meds and IC diet.  Pt prefers to continue oxybutynin xl which has improved her bladder control symptoms.  Continue Flomax daily.  I encouraged her to wean  Xanax to 2 mg q hs prn.  I strongly recommend that she should try to wean Xanax.    S/p InterStim Theray ( both generator and its electrode are located on the right side).  I checked the device and no problem with impedence nor battery life noted ( greater than 90 months left)  However it is not MRI safe.  Therefore, in order for her to get an MRI body, she needs to get her device replaced with MRI safe New InterStim X generator and a new electrode.  Nature and risks of operation explained.  She understands and agrees to proceed with the surgery.    No recent UTI noted.    I spent 40 minutes with the patient of which more than half was spent in direct consultation with the patient in regards to our treatment and plan.        Follow up in about 13 days (around 3/20/2024), or InterStim Revision, removal and replacement of  generator and electrode.

## 2024-03-07 NOTE — H&P (VIEW-ONLY)
CC: InterStim check, needs MRI of right knee    HPI:     Hx of recurrent UTI, hx of incomplete bladder emptying, IC, chronic constipation, s/p InterStim Therapy    Elana Gonzales is a 46 y.o. female with incomplete bladder emptying, interstitial cystitis, and recurrent UTIs. She has tried and failed multiple medical therapies.   C/o incomplete bladder emptying, chronic bladder pain and chronic constipation.  She saw Dr. Kinsey in GI in the past, and has taken Miralax.  I also recommended Probiotics.    She presents today to check InterStim Therapy because she needs MRI of right knee for possible meniscus tear.  However, her InterStim Therapy was placed in 2020 and is not MRI safe  Elana Gonzales is a 45 y.o. female with incomplete bladder emptying, interstitial cystitis, and recurrent UTIs. She has tried and failed multiple medical therapies. She underwent Stage 1 Interstim on 7/22 with an excellent response.   Procedure(s) Performed: 8/5/20  1.  Insertion of peripheral neurostimulator pulse generator (84896)  2.  Electronic analysis of implanted neurostimulator pulse generator system with intraoperative or subsequent programming (98099)  Findings:  Lead placed on right side  Generator placed on right side  Good sensory and motor function c/w S3 stimulation seen    So far she has done really well.  No recurrent UTI since InterStim Therapy.  Has been on IC meds and Xanax 2 mg q hs for her anxiety, which I have tried to wean but she could not tolerate it.  C/o wt gain.  Otherwise she is doing really well in regards to her bladder symptoms.      Physical Exam  HENT:      Head: Normocephalic and atraumatic.   Eyes:      Conjunctiva/sclera: Conjunctivae normal.      Pupils: Pupils are equal, round, and reactive to light.   Cardiovascular:      Rate and Rhythm: Normal rate.      Heart sounds: Normal heart sounds.   Pulmonary:      Effort: Pulmonary effort is normal.      Breath sounds: Normal breath sounds.    Abdominal:      General: Bowel sounds are normal.      Palpations: Abdomen is soft.   Genitourinary:     Comments: InterStim wound well healed.  Musculoskeletal:         General: Normal range of motion.      Cervical back: Normal range of motion and neck supple.   Skin:     General: Skin is warm and dry.   Neurological:      Mental Status: She is alert and oriented to person, place, and time.      Gait: Gait is intact.   Psychiatric:         Mood and Affect: Mood and affect normal.         Cognition and Memory: Memory normal.         Judgment: Judgment normal.       KUB 11/2020  InterStim generator and its electrode located on the right side.    InterStim Neurostimulator Reprogrammin Procedure:  Today, Research nurse Catrachita, evaluate her device.  Using the , different setting were tried until patient felt the appropriate responses.  Originally she had a setting #3 at 1.6 volts.  I think it was too high.  Turned off the device and all pain disappeared.  Impedence check was normal.  Battery life is 90 months.  Reprogram done.  Setting #2 at 0.7 volts resulted in comfortable stimulation in the right perineal area.  Patient experienced the better stimulation as a result of reprogramming and verbalized the familiarity of using an own controller.      UA clear today    Assessment and plan:     Neurostimulator device in situ, InterStim Therapy, sacral  -     X-Ray Sacrum And Coccyx; Future; Expected date: 03/07/2024  -     X-Ray Pelvis Routine AP; Future; Expected date: 03/07/2024    MRI contraindicated due to metal implant  -     CBC Auto Differential; Future; Expected date: 03/07/2024  -     Comprehensive Metabolic Panel; Future; Expected date: 03/07/2024  -     EKG 12-lead; Future; Expected date: 03/07/2024      Continue IC smart diet.  continue IC meds and IC diet.  Pt prefers to continue oxybutynin xl which has improved her bladder control symptoms.  Continue Flomax daily.  I encouraged her to wean  Xanax to 2 mg q hs prn.  I strongly recommend that she should try to wean Xanax.    S/p InterStim Theray ( both generator and its electrode are located on the right side).  I checked the device and no problem with impedence nor battery life noted ( greater than 90 months left)  However it is not MRI safe.  Therefore, in order for her to get an MRI body, she needs to get her device replaced with MRI safe New InterStim X generator and a new electrode.  Nature and risks of operation explained.  She understands and agrees to proceed with the surgery.    No recent UTI noted.    I spent 40 minutes with the patient of which more than half was spent in direct consultation with the patient in regards to our treatment and plan.        Follow up in about 13 days (around 3/20/2024), or InterStim Revision, removal and replacement of  generator and electrode.

## 2024-03-07 NOTE — TELEPHONE ENCOUNTER
Spoke to the pt while in clinic to offer surgery date of 3/20, pt accepted. Went over pre-op instructions while in clinic and informed the pt I will call the day before surgery with arrival time and pre-op instructions after 2pm. Pt verbalized understanding.

## 2024-03-08 ENCOUNTER — HOSPITAL ENCOUNTER (OUTPATIENT)
Dept: RADIOLOGY | Facility: HOSPITAL | Age: 49
Discharge: HOME | End: 2024-03-08
Attending: UROLOGY
Payer: MEDICAID

## 2024-03-08 ENCOUNTER — HOSPITAL ENCOUNTER (OUTPATIENT)
Dept: CARDIOLOGY | Facility: CLINIC | Age: 49
Discharge: HOME | End: 2024-03-08
Payer: MEDICAID

## 2024-03-08 DIAGNOSIS — Z53.09 MRI CONTRAINDICATED DUE TO METAL IMPLANT: ICD-10-CM

## 2024-03-08 DIAGNOSIS — Z96.82 NEUROSTIMULATOR DEVICE IN SITU: ICD-10-CM

## 2024-03-08 LAB
OHS QRS DURATION: 82 MS
OHS QTC CALCULATION: 452 MS

## 2024-03-08 PROCEDURE — 72170 X-RAY EXAM OF PELVIS: CPT | Mod: TC

## 2024-03-08 PROCEDURE — 93010 ELECTROCARDIOGRAM REPORT: CPT | Mod: S$PBB,,, | Performed by: INTERNAL MEDICINE

## 2024-03-08 PROCEDURE — 93005 ELECTROCARDIOGRAM TRACING: CPT | Mod: PBBFAC | Performed by: INTERNAL MEDICINE

## 2024-03-08 PROCEDURE — 72170 X-RAY EXAM OF PELVIS: CPT | Mod: 26,,, | Performed by: RADIOLOGY

## 2024-03-08 PROCEDURE — 72220 X-RAY EXAM SACRUM TAILBONE: CPT | Mod: 26,,, | Performed by: RADIOLOGY

## 2024-03-08 PROCEDURE — 72220 X-RAY EXAM SACRUM TAILBONE: CPT | Mod: TC

## 2024-03-11 DIAGNOSIS — R39.16 STRAINS TO URINATE: ICD-10-CM

## 2024-03-11 RX ORDER — TAMSULOSIN HYDROCHLORIDE 0.4 MG/1
1 CAPSULE ORAL
Qty: 30 CAPSULE | Refills: 4 | Status: SHIPPED | OUTPATIENT
Start: 2024-03-11 | End: 2024-03-21 | Stop reason: SDUPTHER

## 2024-03-18 ENCOUNTER — ANESTHESIA EVENT (OUTPATIENT)
Dept: SURGERY | Facility: HOSPITAL | Age: 49
End: 2024-03-18
Payer: MEDICAID

## 2024-03-18 NOTE — ANESTHESIA PREPROCEDURE EVALUATION
03/18/2024  Elana Gonzales is a 48 y.o., female.      Pre-op Assessment    I have reviewed the Patient Summary Reports.     I have reviewed the Nursing Notes. I have reviewed the NPO Status.   I have reviewed the Medications.     Review of Systems  Anesthesia Hx:  No problems with previous Anesthesia   History of prior surgery of interest to airway management or planning:          Denies Family Hx of Anesthesia complications.   Personal Hx of Anesthesia complications, Post-Operative Nausea/Vomiting                    Hematology/Oncology:  Hematology Normal   Oncology Normal                                   EENT/Dental:  EENT/Dental Normal           Cardiovascular:  Cardiovascular Normal                                            Pulmonary:  Pulmonary Normal                       Renal/:     Interstitial Cystitis             Hepatic/GI:     GERD             Musculoskeletal:  Musculoskeletal Normal                Neurological:      Headaches                                 Psych:   anxiety                 Physical Exam  General: Well nourished, Cooperative, Alert and Oriented    Airway:  Mallampati: III   Mouth Opening: Small, but > 3cm  TM Distance: Normal  Tongue: Normal    Dental:  Intact    Chest/Lungs:  Clear to auscultation, Normal Respiratory Rate    Heart:  Rate: Normal        Anesthesia Plan  Type of Anesthesia, risks & benefits discussed:    Anesthesia Type: Gen ETT  Intra-op Monitoring Plan: Standard ASA Monitors  Post Op Pain Control Plan: multimodal analgesia and IV/PO Opioids PRN  Induction:  IV  Airway Plan: Direct, Post-Induction  Informed Consent: Informed consent signed with the Patient and all parties understand the risks and agree with anesthesia plan.  All questions answered.   ASA Score: 2  Day of Surgery Review of History & Physical: H&P Update referred to the  surgeon/provider.    Ready For Surgery From Anesthesia Perspective.     .

## 2024-03-18 NOTE — PRE-PROCEDURE INSTRUCTIONS
PREOP INSTRUCTIONS:  No food,milk or milk products for 8 hours before surgery.  Clear liquids like water,gatorade,apple juice are allowed up until 2 hours before surgery.  Instructed to follow the surgeon's instructions if they differ from these.  Shower instructions as well as directions to the Surgery Center were given.  Encouraged to wear loose fitting,comfortable clothing.  Medication instructions for pm prior to and am of procedure reviewed.  Instructed to avoid taking vitamins,supplements,aspirin and ibuprofen the morning of surgery.    Patient denies any side effects or issues with anesthesia or sedation other than PONV    Patient does not know arrival time.Explained that this information comes from the surgeon's office and if they haven't heard from them by 2 or 3 pm 3/19/2024 to call the office.Patient stated an understanding.

## 2024-03-18 NOTE — PROGRESS NOTES
[employer] PSR study   IRB number: [id number]  PI: Dr. Yong Anguiano  Patient number:  [id number]     ADDENDUM: 06 JUN, 3 month rtc revision  Planned to be seen in clinic by  6 Jun 2024    1. CHXUEY-CU-90 MAR 2024   1.1  Follow-up visit type: (calculated field): 48 month   1.2  Visit setting: In-office follow-up   1.3  Follow-up site/clinic: [facility] [address]   1.4  Follow-up clinician: Yong Anguiano MD  2. EVENTS ASSESSMENT  Have the patient's medical records been reviewed to ensure that all events that occurred since the last visit have been reported?       2.1  Neurostimulator related events:   Yes   2.2  Lead related events:  Yes   2.3  Extension related events:  Not Applicable   2.4.  Programming/stimulation related events:  Yes     3.  OTHER HOSPITAL ADMISSIONS  Please record any hospitalizations unrelated to the items entered above as those hospitalizations will be recorded on the event form(s):    3.1  Has the patient had any other hospital admission(s) (> 24 hours inpatient stay) since the previous visit: No  5. DEVICE DATA  5.1 Was the Programming CRF completed?     No  6.  OUTCOME MEASURES-SPOKE TO PT 13 MAR 2024   6.1 Was the Patient Global Impression of Improvement (PGI-I) completed by the patient?  Yes     Check the one number that best describes how your bladder or bowl condition is now, compared with how it was before you received the InterStim Implant: 1 Very much better        6.2 Was the Urinary Incontinence Quality of Life (OAB-q short form) completed by the patient?  Yes   During past 4 weeks how bothered were you by  Not at all  A little bit  Somewhat  Quite a bit A great deal  A very great deal    An uncomfortable urge to urinate ?   X      A sudden urge to urinate with little or no warning ?   X      Accidental loss of small amounts of urine?  X        Nighttime urination ?     X    Waking up at night because you had to urinate ?     X    Urine loss associated with a strong desire  to urinate ? X        Caused you to plan escape routes to restrooms in public places?  X        Made you feel like there is something wrong with you ? X        Interfered with your ability to get a good night's rest ?     X    Made you frustrated or annoyed about the amount of time you spend in the restroom ? X        Made you avoid activities away from restrooms (i.e., walks, running, hiking)? X        Awakened you during sleep?      X   Caused you to decrease your physical activities (exercising, sports, etc)? X        Caused you to have problems with your partner or spouse ? X        Made you uncomfortable while traveling with others because of needing to stop for a restroom ? X        Affected your relationships with family and friends ? X        Interfered with getting the amount of sleep you needed ? X        Caused you embarrassment ? X        Caused you to locate the closest restroom as soon as you arrive at a place you have never been?  X           6.3 Was the Fecal Incontinence Quality of Life (FIQOL) completed by the patient?  Patient does not have bowel disorder   1.  REASON FOR MODIFICATION   1.1  Reason for modification (check all that apply):  Contraindicated with other therapy, specify: MRI contraindication  Elective action, Patient choice  2. NEUROSTIMULATOR MODIFICATION    2.1  Neurostimulator modified: Yes   2.1.1  Neurostimulator modification type: Explanted with replacement on this procedure date (complete 2.1.6 and section 3)   2.1.6  Serial number of the neurostimulator modified: [id number]  3. NEUROSTIMULATOR INFORMATION AND DETAILS   3.1  Neurostimulator model number: InterStim II (model 3058)  3.2  Neurostimulator serial number: [id number]   3.3  Neurostimulator location: Right Buttock  4. LEAD MODIFICATION   4.1 Lead Modified: Yes   4.1.1  Lead modification type:       Explanted with replacement on this procedure date (complete 4.1.3 - 4.1.5 and section 5)  4.1.4  Was the entire lead  removed or were fragments left behind?     Entire lead removed  4.1.4.1  Was further dissection and removal attempted/successful? No further dissection attempted  4.1.5  Lot number of lead modified:  [id number]  LEAD INFORMATION AND DETAILS   5.1  Lead model number: InterStim SureScan MRI Lead (model 978B1)   5.2 Lead lot number: [id number]   5.3  Lead length (2 digits after 4 digit model number): 28cm   5.4  Lead tip location: Left S3   5.5  Type of stylet used in lead placement: Not Applicable  5.6  Indicate response obtained with this lead (check all that apply): Achieved motor and sensory response  6.  EXTENSION MODIFICATION   6.1  Extension(s) modified:  Not Applicable  8.  OTHER COMPONENT   8.1  Other component modified: Not Applicable   9.  PROCEDURE INFORMATION AND DETAILS   9.1  Was any device added, repositioned, replaced, or surgically modified?  Yes    9.2  Procedure site/hospital:[facility]   9.3  Procedure physician: Dr. Yong Anguiano   9.4  Procedure start time (as determined by anesthesia - 24hr format): 09 : 37   9.5  Procedure end time (as determined by anesthesia - 24hr format): 11: 45   9.6  Was a system continuity test completed using impedance testing?  Yes   9.6.1  Were all impedances in range?  Yes  Indicate methods used to prevent a surgical site (device-related) infection  9.7  Pre-operative sterile scrub and sterile prep of the operative site(s): Yes   9.8  Pre-operative intravenous antibiotics (e.g., administered within 20-60 min before the skin incision):  Yes   9.9 Intra-operative antibiotic irrigation: Yes   9.10  TYREX antibacteria  envelope:                                                        No       9.11  Post-operative antibiotics: Yes   9.12  Excision of scar tissue from previous lead implant:   No      9.13  Other, specify: ____________________________    10. NEW THERAPY STATUS   10.1  Was therapy initiated on this modification date?   Yes  11. DEVICE DATA  11.1 Was  the Programming CRF completed?     o No        12.  EVENTS ASSESSMENT  12.1   Have the patient's medical records been reviewed to ensure that all Events that occurred during this modification have been reported: Yes     If any event occurred during the procedure, please review the Events log and determine whether the event needs to be reported.

## 2024-03-19 ENCOUNTER — PATIENT MESSAGE (OUTPATIENT)
Dept: UROLOGY | Facility: CLINIC | Age: 49
End: 2024-03-19
Payer: MEDICAID

## 2024-03-19 ENCOUNTER — TELEPHONE (OUTPATIENT)
Dept: UROLOGY | Facility: CLINIC | Age: 49
End: 2024-03-19
Payer: MEDICAID

## 2024-03-19 NOTE — TELEPHONE ENCOUNTER
Called pt to confirm arrival time of 8am for procedure on 3/20/24. Gave pt NPO instructions and gave pt opportunity to ask questions. Pt verbalized understanding.

## 2024-03-20 ENCOUNTER — ANESTHESIA (OUTPATIENT)
Dept: SURGERY | Facility: HOSPITAL | Age: 49
End: 2024-03-20
Payer: MEDICAID

## 2024-03-20 ENCOUNTER — HOSPITAL ENCOUNTER (OUTPATIENT)
Facility: HOSPITAL | Age: 49
Discharge: HOME | End: 2024-03-20
Attending: UROLOGY | Admitting: UROLOGY
Payer: MEDICAID

## 2024-03-20 VITALS
DIASTOLIC BLOOD PRESSURE: 64 MMHG | SYSTOLIC BLOOD PRESSURE: 121 MMHG | RESPIRATION RATE: 18 BRPM | HEIGHT: 63 IN | TEMPERATURE: 97 F | OXYGEN SATURATION: 100 % | WEIGHT: 200 LBS | BODY MASS INDEX: 35.44 KG/M2 | HEART RATE: 86 BPM

## 2024-03-20 DIAGNOSIS — N32.81 OAB (OVERACTIVE BLADDER): ICD-10-CM

## 2024-03-20 PROCEDURE — D9220A PRA ANESTHESIA: Mod: ANES,,, | Performed by: SURGERY

## 2024-03-20 PROCEDURE — D9220A PRA ANESTHESIA: Mod: CRNA,,, | Performed by: NURSE ANESTHETIST, CERTIFIED REGISTERED

## 2024-03-20 PROCEDURE — C1767 GENERATOR, NEURO NON-RECHARG: HCPCS | Performed by: UROLOGY

## 2024-03-20 PROCEDURE — 63600175 PHARM REV CODE 636 W HCPCS: Performed by: NURSE ANESTHETIST, CERTIFIED REGISTERED

## 2024-03-20 PROCEDURE — C1787 PATIENT PROGR, NEUROSTIM: HCPCS | Performed by: UROLOGY

## 2024-03-20 PROCEDURE — 25000003 PHARM REV CODE 250: Performed by: NURSE ANESTHETIST, CERTIFIED REGISTERED

## 2024-03-20 PROCEDURE — 37000008 HC ANESTHESIA 1ST 15 MINUTES: Performed by: UROLOGY

## 2024-03-20 PROCEDURE — 95971 ALYS SMPL SP/PN NPGT W/PRGRM: CPT | Mod: 59,,, | Performed by: UROLOGY

## 2024-03-20 PROCEDURE — 64590 INS/RPL PRPH SAC/GSTR NPG/R: CPT | Mod: 51,,, | Performed by: UROLOGY

## 2024-03-20 PROCEDURE — 36000706: Performed by: UROLOGY

## 2024-03-20 PROCEDURE — 64561 IMPLANT NEUROELECTRODES: CPT | Mod: RT,,, | Performed by: UROLOGY

## 2024-03-20 PROCEDURE — 25000003 PHARM REV CODE 250: Performed by: UROLOGY

## 2024-03-20 PROCEDURE — C1778 LEAD, NEUROSTIMULATOR: HCPCS | Performed by: UROLOGY

## 2024-03-20 PROCEDURE — 37000009 HC ANESTHESIA EA ADD 15 MINS: Performed by: UROLOGY

## 2024-03-20 PROCEDURE — 71000044 HC DOSC ROUTINE RECOVERY FIRST HOUR: Performed by: UROLOGY

## 2024-03-20 PROCEDURE — 71000015 HC POSTOP RECOV 1ST HR: Performed by: UROLOGY

## 2024-03-20 PROCEDURE — 36000707: Performed by: UROLOGY

## 2024-03-20 PROCEDURE — 27201423 OPTIME MED/SURG SUP & DEVICES STERILE SUPPLY: Performed by: UROLOGY

## 2024-03-20 DEVICE — SYS INTERSTIM X RECHARGE FREE: Type: IMPLANTABLE DEVICE | Site: BACK | Status: FUNCTIONAL

## 2024-03-20 DEVICE — LEAD INTERSTIM 2 SURESCAN 28CM: Type: IMPLANTABLE DEVICE | Site: BACK | Status: FUNCTIONAL

## 2024-03-20 RX ORDER — MIDAZOLAM HYDROCHLORIDE 1 MG/ML
INJECTION INTRAMUSCULAR; INTRAVENOUS
Status: DISCONTINUED | OUTPATIENT
Start: 2024-03-20 | End: 2024-03-20

## 2024-03-20 RX ORDER — SODIUM CHLORIDE 0.9 % (FLUSH) 0.9 %
10 SYRINGE (ML) INJECTION
Status: DISCONTINUED | OUTPATIENT
Start: 2024-03-20 | End: 2024-03-20 | Stop reason: HOSPADM

## 2024-03-20 RX ORDER — PHENYLEPHRINE HYDROCHLORIDE 10 MG/ML
INJECTION INTRAVENOUS
Status: DISCONTINUED | OUTPATIENT
Start: 2024-03-20 | End: 2024-03-20

## 2024-03-20 RX ORDER — AMOXICILLIN AND CLAVULANATE POTASSIUM 875; 125 MG/1; MG/1
1 TABLET, FILM COATED ORAL EVERY 12 HOURS
Qty: 10 TABLET | Refills: 0 | Status: SHIPPED | OUTPATIENT
Start: 2024-03-20 | End: 2024-03-25

## 2024-03-20 RX ORDER — PROPOFOL 10 MG/ML
VIAL (ML) INTRAVENOUS
Status: DISCONTINUED | OUTPATIENT
Start: 2024-03-20 | End: 2024-03-20

## 2024-03-20 RX ORDER — LIDOCAINE HYDROCHLORIDE 10 MG/ML
INJECTION, SOLUTION EPIDURAL; INFILTRATION; INTRACAUDAL; PERINEURAL
Status: DISCONTINUED | OUTPATIENT
Start: 2024-03-20 | End: 2024-03-20

## 2024-03-20 RX ORDER — SUCCINYLCHOLINE CHLORIDE 20 MG/ML
INJECTION INTRAMUSCULAR; INTRAVENOUS
Status: DISCONTINUED | OUTPATIENT
Start: 2024-03-20 | End: 2024-03-20

## 2024-03-20 RX ORDER — SODIUM CHLORIDE 9 MG/ML
INJECTION, SOLUTION INTRAVENOUS CONTINUOUS PRN
Status: DISCONTINUED | OUTPATIENT
Start: 2024-03-20 | End: 2024-03-20

## 2024-03-20 RX ORDER — FENTANYL CITRATE 50 UG/ML
25 INJECTION, SOLUTION INTRAMUSCULAR; INTRAVENOUS EVERY 5 MIN PRN
Status: DISCONTINUED | OUTPATIENT
Start: 2024-03-20 | End: 2024-03-20 | Stop reason: HOSPADM

## 2024-03-20 RX ORDER — SODIUM BICARBONATE 1 MEQ/ML
SYRINGE (ML) INTRAVENOUS
Status: DISCONTINUED | OUTPATIENT
Start: 2024-03-20 | End: 2024-03-20 | Stop reason: HOSPADM

## 2024-03-20 RX ORDER — SCOLOPAMINE TRANSDERMAL SYSTEM 1 MG/1
1 PATCH, EXTENDED RELEASE TRANSDERMAL
Status: DISCONTINUED | OUTPATIENT
Start: 2024-03-20 | End: 2024-03-20 | Stop reason: HOSPADM

## 2024-03-20 RX ORDER — HALOPERIDOL 5 MG/ML
0.5 INJECTION INTRAMUSCULAR EVERY 10 MIN PRN
Status: DISCONTINUED | OUTPATIENT
Start: 2024-03-20 | End: 2024-03-20 | Stop reason: HOSPADM

## 2024-03-20 RX ORDER — ONDANSETRON HYDROCHLORIDE 2 MG/ML
INJECTION, SOLUTION INTRAVENOUS
Status: DISCONTINUED | OUTPATIENT
Start: 2024-03-20 | End: 2024-03-20

## 2024-03-20 RX ORDER — HYDROMORPHONE HYDROCHLORIDE 1 MG/ML
0.2 INJECTION, SOLUTION INTRAMUSCULAR; INTRAVENOUS; SUBCUTANEOUS EVERY 5 MIN PRN
Status: DISCONTINUED | OUTPATIENT
Start: 2024-03-20 | End: 2024-03-20 | Stop reason: HOSPADM

## 2024-03-20 RX ORDER — OXYCODONE HYDROCHLORIDE 5 MG/1
5 TABLET ORAL EVERY 4 HOURS PRN
Qty: 7 TABLET | Refills: 0 | Status: SHIPPED | OUTPATIENT
Start: 2024-03-20

## 2024-03-20 RX ORDER — FENTANYL CITRATE 50 UG/ML
INJECTION, SOLUTION INTRAMUSCULAR; INTRAVENOUS
Status: DISCONTINUED | OUTPATIENT
Start: 2024-03-20 | End: 2024-03-20

## 2024-03-20 RX ADMIN — FENTANYL CITRATE 100 MCG: 50 INJECTION, SOLUTION INTRAMUSCULAR; INTRAVENOUS at 09:03

## 2024-03-20 RX ADMIN — SODIUM CHLORIDE 160 MG: 9 INJECTION, SOLUTION INTRAVENOUS at 10:03

## 2024-03-20 RX ADMIN — ONDANSETRON 4 MG: 2 INJECTION INTRAMUSCULAR; INTRAVENOUS at 09:03

## 2024-03-20 RX ADMIN — SODIUM CHLORIDE: 0.9 INJECTION, SOLUTION INTRAVENOUS at 09:03

## 2024-03-20 RX ADMIN — PHENYLEPHRINE HYDROCHLORIDE 200 MCG: 10 INJECTION INTRAVENOUS at 10:03

## 2024-03-20 RX ADMIN — SODIUM CHLORIDE: 0.9 INJECTION, SOLUTION INTRAVENOUS at 11:03

## 2024-03-20 RX ADMIN — SODIUM CHLORIDE 1000 MG: 9 INJECTION, SOLUTION INTRAVENOUS at 10:03

## 2024-03-20 RX ADMIN — LIDOCAINE HYDROCHLORIDE 5 ML: 10 INJECTION, SOLUTION EPIDURAL; INFILTRATION; INTRACAUDAL; PERINEURAL at 09:03

## 2024-03-20 RX ADMIN — PROPOFOL 200 MG: 10 INJECTION, EMULSION INTRAVENOUS at 09:03

## 2024-03-20 RX ADMIN — MIDAZOLAM HYDROCHLORIDE 2 MG: 2 INJECTION, SOLUTION INTRAMUSCULAR; INTRAVENOUS at 09:03

## 2024-03-20 RX ADMIN — SUCCINYLCHOLINE CHLORIDE 120 MG: 20 INJECTION, SOLUTION INTRAMUSCULAR; INTRAVENOUS at 09:03

## 2024-03-20 NOTE — PLAN OF CARE
Discharge instructions given and explained to patient and family with verbalization of understanding all instructions. Currently waiting on bedside pharmacy to deliver prescriptions. Patients v/s stable, denies n/v and tolerating po, rates pain level tolerable, IV removed, and family at bedside for patient discharge home.

## 2024-03-20 NOTE — TRANSFER OF CARE
"Anesthesia Transfer of Care Note    Patient: Elana Gonzales    Procedure(s) Performed: Procedure(s) (LRB):  REMOVAL, ELECTRODE LEAD, SACRAL NERVE STIMULATOR (Right)  INSERTION, ELECTRODE LEADS AND PULSE GENERATOR, NEUROSTIMULATOR, SACRAL (Left)  REVISION, IMPLANTED DEVICE (Right)    Patient location: PACU    Anesthesia Type: general    Transport from OR: Transported from OR on room air with adequate spontaneous ventilation    Post pain: adequate analgesia    Post assessment: no apparent anesthetic complications    Post vital signs: stable    Level of consciousness: awake    Nausea/Vomiting: no nausea/vomiting    Complications: none    Transfer of care protocol was followed      Last vitals: Visit Vitals  /77 (BP Location: Left arm, Patient Position: Lying)   Pulse 94   Temp 36.7 °C (98.1 °F) (Temporal)   Resp 18   Ht 5' 3" (1.6 m)   Wt 90.7 kg (200 lb)   LMP 08/26/2008   SpO2 97%   Breastfeeding No   BMI 35.43 kg/m²     "

## 2024-03-20 NOTE — DISCHARGE INSTRUCTIONS
Sacral Neuromodulation Post-Operative Care    Caring for Your Wound   After surgery you will have a dressing over the surgical site.  Do not remove or change the dressing. If your dressing becomes saturated or undone, you  may reinforce it with more tape and/or gauze but you should call the office to be evaluated.    Post Surgical Pain Management   It is normal to experience some discomfort post operatively, however the stimulation should not  be painful.   Take Tylenol 650mg 1-2 tabs every 4-6 hours as needed if you feel tenderness from surgical  incision (Do not to exceed 4000mg daily) or Motrin 200mg 1-2 tabs every 4-6 hours.  Warning: Do not take additional Acetaminophen (Tylenol) while taking oxycodone or hydrocodone. All of these  products contain Acetaminophen, which can be toxic if taken in excess.    Stimulation   Stimulation is the pulling or tingling sensation felt in the pelvic area (near the vagina, scrotum  or anal area.) It should not be painful. If it is painful or you feel the stimulation sensation move  down the legs decrease the stimulation and call the office and speak to a nurse.   During the trial period (stage 1) you may notice that the Interstim device has improved your  continence which means it is working and on the correct setting. If you are having episodes of  incontinence you will need to increase your device setting by moving the dial up slowly.    Activity   Avoid heavy lifting or strenuous activity for 14 days after your surgery.   Frontal Showers or Sponge bath only for two weeks after each surgery. Avoid immersion in any  water until after your post-op appointment.    Symptoms to Report   Severe or worsening pain, unrelieved by pain medications.   Fever, greater than 101.5ºF, chills or sweats   Painful or problems urinating   Any problems with the device    If you experience any of the above symptoms, call the [facility] at [phone] during business hours on  weekdays.

## 2024-03-20 NOTE — INTERVAL H&P NOTE
The patient has been examined and the H&P has been reviewed:    I concur with the findings and no changes have occurred since H&P was written.    Surgery risks, benefits and alternative options discussed and understood by patient/family.    OR today for Interstim revision/replacement of generator and lead with MRI compatible device.  No blood thinners.       There are no hospital problems to display for this patient.

## 2024-03-20 NOTE — BRIEF OP NOTE
[facility] ([facility])  Brief Operative Note    Surgery Date: 3/20/2024     Surgeon(s) and Role:     * Yong Anguiano MD - Primary     * Saray Molina MD - Resident - Assisting        Pre-op Diagnosis:  OAB (overactive bladder) [N32.81]  MRI contraindicated due to metal implant [Z53.09]  Neurostimulator device in situ [Z96.82]    Post-op Diagnosis:  Post-Op Diagnosis Codes:     * OAB (overactive bladder) [N32.81]     * MRI contraindicated due to metal implant [Z53.09]     * Neurostimulator device in situ [Z96.82]    Procedure(s) (LRB):  REMOVAL, ELECTRODE LEAD, SACRAL NERVE STIMULATOR (Right)  INSERTION, ELECTRODE LEADS AND PULSE GENERATOR, NEUROSTIMULATOR, SACRAL (Left)  REVISION, IMPLANTED DEVICE (Right)    Anesthesia: General    Operative Findings: Interstim removal and replacement     Estimated Blood Loss: min          Specimens:   Specimen (24h ago, onward)      None              Discharge Note    OUTCOME: Patient tolerated treatment/procedure well without complication and is now ready for discharge.    DISPOSITION: Home or Self Care    FINAL DIAGNOSIS:  OAB    FOLLOWUP: In clinic

## 2024-03-20 NOTE — ANESTHESIA POSTPROCEDURE EVALUATION
Anesthesia Post Evaluation    Patient: Elana Gonzales    Procedure(s) Performed: Procedure(s) (LRB):  REMOVAL, ELECTRODE LEAD, SACRAL NERVE STIMULATOR (Right)  INSERTION, ELECTRODE LEADS AND PULSE GENERATOR, NEUROSTIMULATOR, SACRAL (Left)  REVISION, IMPLANTED DEVICE (Right)    Final Anesthesia Type: general      Patient location during evaluation: PACU  Patient participation: Yes- Able to Participate  Level of consciousness: awake and alert  Post-procedure vital signs: reviewed and stable  Pain management: adequate  Airway patency: patent  DANIEL mitigation strategies: Multimodal analgesia, Preoperative use of mandibular advancement devices or oral appliances, Intraoperative administration of CPAP, nasopharyngeal airway, or oral appliance during sedation, Extubation while patient is awake and Verification of full reversal of neuromuscular block  PONV status at discharge: No PONV  Anesthetic complications: no      Cardiovascular status: blood pressure returned to baseline, hemodynamically stable and stable  Respiratory status: unassisted and spontaneous ventilation  Hydration status: euvolemic  Follow-up not needed.              Vitals Value Taken Time   /56 03/20/24 1215   Temp 36.1 °C (97 °F) 03/20/24 1143   Pulse 92 03/20/24 1215   Resp 20 03/20/24 1215   SpO2 97 % 03/20/24 1215         No case tracking events are documented in the log.      Pain/Reyes Score: Reyes Score: 10 (3/20/2024 12:15 PM)

## 2024-03-20 NOTE — ANESTHESIA PROCEDURE NOTES
Intubation    Date/Time: 3/20/2024 9:47 AM    Performed by: Babak Santos CRNA  Authorized by: Keaton Berry MD    Intubation:     Induction:  Intravenous    Intubated:  Postinduction    Mask Ventilation:  Easy mask    Attempts:  1    Attempted By:  CRNA    Method of Intubation:  Direct    Blade:  Stefani 3    Laryngeal View Grade: Grade I - full view of cords      Difficult Airway Encountered?: No      Complications:  None    Airway Device:  Direct and oral endotracheal tube    Airway Device Size:  7.0    Style/Cuff Inflation:  Cuffed (inflated to minimal occlusive pressure)    Tube secured:  21    Secured at:  The lips    Placement Verified By:  Auscultation and Capnometry    Complicating Factors:  None    Findings Post-Intubation:  Bilateral breath sounds, positive ETCO2 and atraumatic / condition of teeth unchanged

## 2024-03-20 NOTE — OP NOTE
[facility]  Operative Note     Date: 03/20/2024     Pre-Op Diagnosis: urinary urgency, frequency, urge urinary incontinence     Post-Op Diagnosis: same     Procedure(s) Performed:   1.  Explantation of InterStim generator  2.  Removal of InterStim tined lead  3.  Incision for implantation of neurostimulator electrodes, sacral nerve (transforamenal placement)  4.  Insertion of peripheral neurostimulator pulse generator (InterStim X)  5.  Fluoro < 1 h  6.  Electronic analysis of implanted neurostimulator pulse generator system with intraoperative programming     Specimen(s): old Interstim generator and lead sent for gross pathology      Staff Surgeon: Yong Anguiano MD     Assistant Surgeon: Saray Molina MD PGY-2     Anesthesia: General Anesthesia     Indications: Elana Gonzales is a 48 y.o. female with urgency, frequency and urge urinary incontinence.  This has been refractory to medical management.  The patient has decided to pursue neuromodulation therapy.   She had initial Interstim placement on 8/5/2020. She presents today for replacement with an MRI compatible device.     Findings:   Lead and pulse generator removed from right in entirety  Lead placed on left side  Generator placed on right side  Good sensory and motor function c/w S3 stimulation seen  The rolling pen technique was used to localize S3     Estimated Blood Loss: min     Drains: none     Procedure in Detail:  After informed consent was obtained, the patient was brought to the operating suite and placed in the prone position.  Pillows were placed under lower abdomen to flatten sacrum and under shins to allow the toes to dangle freely.  MAC anesthesia was administered. The patient's back, buttocks and anus were prepped and draped in the usual sterile fashion.  Timeout was performed and pre-operative antibiotics were confirmed.       The generator site was located on the patient's left buttock.  Local anesthetic was used to anesthetize the  skin incision overlying the generator. An incision was then made sharply with a 10 blade.  Bovie electrocautery was then used to dissect down to the capsule. A 10 blade was used to open up the capsule which was closely adherent to the generator. The generator was delivered out of the incision.      The lead was identified and protected. Hemostasis was achieved using bovie electrocautery. The small screwdriver was then used to loosen the screw to detach the lead from the generator. The generator was removed and passed off the field.     The lead was then pulled gently and we identified the previous midline incision. This area was infiltrated with local anesthetic.  A 10 blade was used to make a skin incision over the identified area. A right angle was then used to deliver the lead out of the midline incision. A hemostat was used to clamp the lead and the lead was brought from the generator site to the midline incision and out the skin. The cardiac stent device was used to provide backing to the lead. Gentle traction was then used to slowly remove the remainder of the lead.  The entire lead was removed and inspected and found to be completely in tact.       The C-arm was draped and moved into AP position to provide fluoroscopic mapping of the sacral region.  A pen was allowed to roll in the midline of the sacrum until it balanced horizontally.  The fulcrum point was marked as the level of S3.  A point 2 cm lateral and 2 cm superior to the marked S3 level was marked as the entry point for the foramen needle.  1% lidocaine with epinephrine was injected into this area where the finder needles were to be placed.  A foramen needle was introduced until the S3 foramen was identified and penetrated.  The depth of the needle was confirmed and adjusted fluoroscopically.  Proper needle position was confirmed by patient identification of location of sensation, direct observation of the lifting of the perineum and observation of  plantar flexion of the great toe utilizing the external test stimulator.  The foramen needle stylet was removed and a directional guide was placed and confirmed fluoroscopically.  The needle was removed keeping the directional guide in place.  An incision was made peripherally to the directional guide through the fascial layer.  The lead introducer sheath with dilator was placed over the directional guide and directed into the foramen ensuring the radiopaque marker did not extend beyond the anterior edge of the sacrum. The dilator was unlocked and removed along with the directional guide keeping the introducer sheath in place.      The lead was then placed through the introducer sheath to the first white line.  Position was checked fluoroscopically.  The lead was then further introduced until 4 electrodes were visible below the sacrum.  Each electrode was tested for location of patient sensation, visualization of balbir, and plantar flexion of the great toe.  After satisfactory positioning was confirmed, the introducer sheath was retracted under continuous fluoro, deploying lead tines into the perisacral tissue.       The capsule was incised posteriorly using electrocautery.  The tunneling tool with sheath was placed from the lead exit site subcutaneously to the incised pocket site. The tunneling tool was removed and the lead was fed through the sheath and pulled out at the pocket site. The lead was cleansed of bodily fluids and dried. The lead was inserted into the neurostimulator and the metal bands were aligned with the blue lead tip clearly visible in the distal portion of the header. The single setscrew was tightened with the hex wrench.  The neurostimulator was delivered into the subcutaneous pocket with the etched identification side placed upwards and the excessive lead wrapped around the neurostimulator. The programming head was placed over the implanted neurostimulator in a sterile cover to ensure  adequate lead connection and that parameters were within normal limits. Impedances were confirmed to be within normal limits.       The incisions were then closed using deep dermal 3-0 vicryl running sutures followed by a 4-0 monocryl subcuticular suture.  The needle entry point incision was closed in one layer with a simple interrupted 4-0 monocryl suture. Dermabond was placed over the incisions.       The patient tolerated the procedure well and was transferred to the recovery room in stable condition.       Disposition:  The patient will meet with the [facility] in the recovery room for instructions on programming the stimulator.  The patient was given prescriptions for antibiotics and oxycodone and will follow up with Dr. Anguiano in 3 months.

## 2024-03-21 DIAGNOSIS — N32.81 OAB (OVERACTIVE BLADDER): ICD-10-CM

## 2024-03-21 DIAGNOSIS — F41.9 ANXIETY: ICD-10-CM

## 2024-03-21 DIAGNOSIS — N30.10 IC (INTERSTITIAL CYSTITIS): ICD-10-CM

## 2024-03-21 DIAGNOSIS — R39.16 STRAINS TO URINATE: ICD-10-CM

## 2024-03-21 RX ORDER — OXYBUTYNIN CHLORIDE 10 MG/1
10 TABLET, EXTENDED RELEASE ORAL
Qty: 30 TABLET | Refills: 9 | OUTPATIENT
Start: 2024-03-21

## 2024-03-21 NOTE — TELEPHONE ENCOUNTER
----- Message from Helene Jean Baptiste sent at 3/21/2024  1:39 PM CDT -----  Regarding: Prescriptions  Contact: [phone]  Calling in regards to request prescription for Rx amitriptyline (ELAVIL) 25 MG tablet, Rx ALPRAZolam (XANAX) 2 MG Tab, Rx oxybutynin (DITROPAN-XL) 10 MG 24 hr tablet, Rx tamsulosin (FLOMAX) 0.4 mg Cap, and Rx hydrOXYzine HCL (ATARAX) 25 MG tablet.  ... sent to [facility] pharmacy to be filled. Please call to confirm prescription has been sent.     [facility] [place] [address] [place] [address]  [address]  [place] [address]  Phone: [phone] Fax: [phone]

## 2024-03-22 RX ORDER — OXYBUTYNIN CHLORIDE 10 MG/1
10 TABLET, EXTENDED RELEASE ORAL DAILY
Qty: 90 TABLET | Refills: 3 | Status: SHIPPED | OUTPATIENT
Start: 2024-03-22 | End: 2025-03-22

## 2024-03-22 RX ORDER — HYDROXYZINE HYDROCHLORIDE 25 MG/1
25 TABLET, FILM COATED ORAL NIGHTLY
Qty: 90 TABLET | Refills: 3 | Status: SHIPPED | OUTPATIENT
Start: 2024-03-22 | End: 2025-03-22

## 2024-03-22 RX ORDER — ALPRAZOLAM 2 MG/1
2 TABLET ORAL NIGHTLY PRN
Qty: 30 TABLET | Refills: 4 | Status: SHIPPED | OUTPATIENT
Start: 2024-03-22

## 2024-03-22 RX ORDER — AMITRIPTYLINE HYDROCHLORIDE 25 MG/1
25 TABLET, FILM COATED ORAL NIGHTLY
Qty: 90 TABLET | Refills: 3 | Status: SHIPPED | OUTPATIENT
Start: 2024-03-22 | End: 2025-03-22

## 2024-03-22 RX ORDER — TAMSULOSIN HYDROCHLORIDE 0.4 MG/1
1 CAPSULE ORAL DAILY
Qty: 90 CAPSULE | Refills: 3 | Status: SHIPPED | OUTPATIENT
Start: 2024-03-22 | End: 2025-03-22

## (undated) DEVICE — SUT 2-0 12-18IN SILK

## (undated) DEVICE — DRESSING TRANS 4X4 TEGADERM

## (undated) DEVICE — GLOVE BIOGEL 7.5

## (undated) DEVICE — EVACUATOR WOUND BULB 100CC

## (undated) DEVICE — NDL WILLIAMS CYSTOSCOPIC

## (undated) DEVICE — COVERS PROBE NR-48 STERILE

## (undated) DEVICE — ADHESIVE MASTISOL VIAL 48/BX

## (undated) DEVICE — CLOSURE SKIN STERI STRIP 1/2X4

## (undated) DEVICE — SEE MEDLINE ITEM 157117

## (undated) DEVICE — BLADE SURG STAINLESS STEEL #15

## (undated) DEVICE — CABLE TEST MINI J HOOK

## (undated) DEVICE — TUBING PENADAPT ELECSURG PNCL

## (undated) DEVICE — TRAY CATH FOL SIL URIMTR 16FR

## (undated) DEVICE — DRAPE STERI-DRAPE 1000 17X11IN

## (undated) DEVICE — SKINMARKER & RULER REGULAR X-F

## (undated) DEVICE — SOL 9P NACL IRR PIC IL

## (undated) DEVICE — ELECTRODE NEEDLE 2.8IN

## (undated) DEVICE — TRAY SKIN SCRUB WET PREMIUM

## (undated) DEVICE — TRAY MINOR GEN SURG

## (undated) DEVICE — PAD K-THERMIA 24IN X 60IN

## (undated) DEVICE — Device

## (undated) DEVICE — NDL HYPO REG 25G X 1 1/2

## (undated) DEVICE — WATER STRL IRR USP UROMTC 1000

## (undated) DEVICE — ELECTRODE NEEDLE 1IN

## (undated) DEVICE — SOL IRR WATER STRL 3000 ML

## (undated) DEVICE — ELECTRODE REM PLYHSV RETURN 9

## (undated) DEVICE — NDL SPINAL 20GX3.5 HUB

## (undated) DEVICE — SEE MEDLINE ITEM 157148

## (undated) DEVICE — SOL WATER STRL IRR 1000ML

## (undated) DEVICE — SUT MONOCRYL 4-0 SH UND MON

## (undated) DEVICE — NDL 25GA 5FR 35MM

## (undated) DEVICE — KIT PROBE COVER WITH GEL

## (undated) DEVICE — PROGRAMMER INTERSTIM HANDSET

## (undated) DEVICE — SYS CLSR DERMABOND PRINEO 22CM

## (undated) DEVICE — ELECTRODE BLD 1 INCH TEFLON

## (undated) DEVICE — NDL INTERSTIM FORAMN 18.5G 5IN

## (undated) DEVICE — DRESSING TELFA STRL 4X3 LF

## (undated) DEVICE — SET CYSTO IRRIGATION UNIV SPIK

## (undated) DEVICE — DRESSING TRANS 2X2 TEGADERM

## (undated) DEVICE — SEE MEDLINE ITEM 152622

## (undated) DEVICE — TRAY MINOR GEN SURG OMC

## (undated) DEVICE — GOWN SMARTGOWN LVL4 X-LONG XL

## (undated) DEVICE — TRAY CYSTO BASIN

## (undated) DEVICE — SUT 2-0 VICRYL / SH (J417)

## (undated) DEVICE — SCREENER ISTIM EXT NEROSTIMLTR

## (undated) DEVICE — SYR SLIP TIP 1CC

## (undated) DEVICE — TAPE SILK 3IN

## (undated) DEVICE — PAD CURAD NONADH 3X4IN

## (undated) DEVICE — PACK DRAPE UNIVERSAL CONVERTOR

## (undated) DEVICE — BLADE SURG CARBON STEEL #10

## (undated) DEVICE — SOL IRR NACL .9% 3000ML

## (undated) DEVICE — NDL HYPO STD REG BVL 18GX1.5IN

## (undated) DEVICE — SUT MONOCRYL 3-0 SH U/D

## (undated) DEVICE — PACK CYSTO

## (undated) DEVICE — NDL SPINAL SPINOCAN 22GX3.5

## (undated) DEVICE — SUT CTD VICRYL 3-0 CR/SH

## (undated) DEVICE — DRESSING NON-ADHERENT 3 X 4 ST

## (undated) DEVICE — NDL 18GA X1 1/2 REG BEVEL

## (undated) DEVICE — SEE MEDLINE ITEM 146313

## (undated) DEVICE — CAUTERY PUSHBUTTON PENCIL

## (undated) DEVICE — BLADE SURG CARBON STEEL SZ11

## (undated) DEVICE — CABLE TWIST LOCK 64CM

## (undated) DEVICE — DRAIN SIL RND HUBLSS 19F TRCR

## (undated) DEVICE — SPRAY MASTISOL

## (undated) DEVICE — DRAPE LAP T SHT W/ INSTR PAD

## (undated) DEVICE — DRAPE C-ARMOR EQUIPMENT COVER

## (undated) DEVICE — SEE MEDLINE ITEM 154981

## (undated) DEVICE — DRESSING ANTIMICROBIAL 1 INCH

## (undated) DEVICE — PLUG CATHETER STERILE FOLEY

## (undated) DEVICE — SEE MEDLINE ITEM 146417

## (undated) DEVICE — COVER OVERHEAD SURG LT BLUE

## (undated) DEVICE — SUT 2/0 30IN SILK BLACK

## (undated) DEVICE — SUT MCRYL PLUS 4-0 PS2 27IN

## (undated) DEVICE — KIT INTRODUCER W/2 9CM FRN NDL

## (undated) DEVICE — BLANKET LOWER BODY 55.9X40.2IN

## (undated) DEVICE — PACK PERI/GYN OPTIMA

## (undated) DEVICE — DRAPE STERI INSTRUMENT 1018

## (undated) DEVICE — TRAY FOLEY 16FR INFECTION CONT

## (undated) DEVICE — HANDSET INTERSTIM X COMM

## (undated) DEVICE — SPONGE LAP 18X18 PREWASHED

## (undated) DEVICE — GOWN SURGICAL X-LARGE

## (undated) DEVICE — DEVICE LEAD EXTRACTION 1 65CM

## (undated) DEVICE — NDL HYPODERMIC BLUNT 18G 1.5IN

## (undated) DEVICE — CANISTER SUCTION 2 LTR

## (undated) DEVICE — NDL SPINAL 22GX5

## (undated) DEVICE — SEE MEDLINE ITEM 157181

## (undated) DEVICE — SET IRR URLGY 2LINE UNIV SPIKE

## (undated) DEVICE — SUT STRATAFIX PGAPCL 3 FS-1

## (undated) DEVICE — SEE MEDLINE ITEM 152186

## (undated) DEVICE — TIP YANKAUERS BULB NO VENT

## (undated) DEVICE — DRAPE ABDOMINAL TIBURON 14X11

## (undated) DEVICE — SYR ONLY LUER LOCK 20CC

## (undated) DEVICE — STAPLER SKIN ROTATING HEAD

## (undated) DEVICE — SPONGE DERMACEA GAUZE 4X4

## (undated) DEVICE — GLOVE SURGICAL LATEX SZ 7

## (undated) DEVICE — SYS LABLNG CORECT MED 4 FLG

## (undated) DEVICE — SUT 2/0 27IN PDS II VIO MO

## (undated) DEVICE — SYRINGE 10CC LL

## (undated) DEVICE — DRAPE C ARM 42 X 120 10/BX